# Patient Record
Sex: MALE | Race: WHITE | NOT HISPANIC OR LATINO | Employment: OTHER | URBAN - METROPOLITAN AREA
[De-identification: names, ages, dates, MRNs, and addresses within clinical notes are randomized per-mention and may not be internally consistent; named-entity substitution may affect disease eponyms.]

---

## 2017-12-15 ENCOUNTER — APPOINTMENT (OUTPATIENT)
Dept: GENERAL RADIOLOGY | Facility: CLINIC | Age: 72
End: 2017-12-15
Attending: EMERGENCY MEDICINE
Payer: MEDICARE

## 2017-12-15 ENCOUNTER — APPOINTMENT (OUTPATIENT)
Dept: GENERAL RADIOLOGY | Facility: CLINIC | Age: 72
End: 2017-12-15
Attending: OBSTETRICS & GYNECOLOGY
Payer: MEDICARE

## 2017-12-15 ENCOUNTER — HOSPITAL ENCOUNTER (EMERGENCY)
Facility: CLINIC | Age: 72
Discharge: HOME | End: 2017-12-15
Attending: EMERGENCY MEDICINE | Admitting: EMERGENCY MEDICINE
Payer: MEDICARE

## 2017-12-15 VITALS
OXYGEN SATURATION: 94 % | BODY MASS INDEX: 32.33 KG/M2 | TEMPERATURE: 97.6 F | DIASTOLIC BLOOD PRESSURE: 72 MMHG | RESPIRATION RATE: 16 BRPM | WEIGHT: 260 LBS | HEART RATE: 82 BPM | SYSTOLIC BLOOD PRESSURE: 143 MMHG | HEIGHT: 75 IN

## 2017-12-15 DIAGNOSIS — S62.101A CLOSED FRACTURE OF RIGHT WRIST, INITIAL ENCOUNTER: ICD-10-CM

## 2017-12-15 PROCEDURE — 25605 CLTX DST RDL FX/EPHYS SEP W/: CPT | Mod: RT

## 2017-12-15 PROCEDURE — 96374 THER/PROPH/DIAG INJ IV PUSH: CPT

## 2017-12-15 PROCEDURE — 40000277 XR SURGERY CARM FLUORO LESS THAN 5 MIN W STILLS

## 2017-12-15 PROCEDURE — 40000986 XR WRIST RT G/E 3 VW: Mod: RT

## 2017-12-15 PROCEDURE — 25000128 H RX IP 250 OP 636: Performed by: EMERGENCY MEDICINE

## 2017-12-15 PROCEDURE — 73110 X-RAY EXAM OF WRIST: CPT | Mod: RT

## 2017-12-15 PROCEDURE — 96375 TX/PRO/DX INJ NEW DRUG ADDON: CPT | Mod: 59

## 2017-12-15 PROCEDURE — 99285 EMERGENCY DEPT VISIT HI MDM: CPT | Mod: 25

## 2017-12-15 RX ORDER — OXYCODONE AND ACETAMINOPHEN 5; 325 MG/1; MG/1
1-2 TABLET ORAL EVERY 4 HOURS PRN
Qty: 15 TABLET | Refills: 0 | Status: ON HOLD | OUTPATIENT
Start: 2017-12-15 | End: 2023-12-16

## 2017-12-15 RX ORDER — HYDROMORPHONE HYDROCHLORIDE 1 MG/ML
0.5 INJECTION, SOLUTION INTRAMUSCULAR; INTRAVENOUS; SUBCUTANEOUS
Status: DISCONTINUED | OUTPATIENT
Start: 2017-12-15 | End: 2017-12-15 | Stop reason: HOSPADM

## 2017-12-15 RX ORDER — ONDANSETRON 2 MG/ML
4 INJECTION INTRAMUSCULAR; INTRAVENOUS ONCE
Status: COMPLETED | OUTPATIENT
Start: 2017-12-15 | End: 2017-12-15

## 2017-12-15 RX ADMIN — ONDANSETRON 4 MG: 2 INJECTION INTRAMUSCULAR; INTRAVENOUS at 18:37

## 2017-12-15 RX ADMIN — HYDROMORPHONE HYDROCHLORIDE 0.5 MG: 1 INJECTION, SOLUTION INTRAMUSCULAR; INTRAVENOUS; SUBCUTANEOUS at 18:37

## 2017-12-15 ASSESSMENT — ENCOUNTER SYMPTOMS
ARTHRALGIAS: 1
JOINT SWELLING: 1

## 2017-12-15 NOTE — ED AVS SNAPSHOT
Emergency Department    [address] [facility] [address]    Phone:  [phone]    Fax:  [phone]                                       Shola Lemus   MRN: [id number]    Department:   Emergency Department   Date of Visit:  12/15/2017           Patient Information     Date Of Birth          1945        Your diagnoses for this visit were:     Closed fracture of right wrist, initial encounter        You were seen by Tyson Hedrick MD.      Follow-up Information     Follow up with Gabriel Medrano MD.    Specialty:  Orthopedics    Contact information:    [facility] ORTHOPEDICS  [address] [place] [id number]  [phone]          Discharge Instructions         Upper Extremity Fracture    You have a break (fracture) of the arm, wrist, or hand. This may be a small crack in the bone. Or it may be a major break, with the broken parts pushed out of position. Most fractures will heal without surgery. But you may need surgery if the bones are far out of place or if the break is near the elbow. Treatment is with a special sling called a shoulder immobilizer, or a splint or cast, depending on the type of fracture and where the fracture is. This fracture takes 4 to 6 weeks or longer to heal. The cast may need to be changed in 2 to 3 weeks as swelling goes down.  Home care  Follow these guidelines when caring for yourself:    If you were given a shoulder immobilizer, leave it in place. This will support the injured arm at your side. This is the best position for bone healing. The shoulder immobilizer can be adjusted. If it becomes loose, adjust it so that your forearm is level with the ground (horizontal). Your hand should be level with your elbow.    Put an ice pack on the injured area. Do this for 20 minutes every 1 to 2 hours the first day for pain relief. You can make an ice pack by wrapping a plastic bag of ice cubes in a thin towel. As the ice melts, be careful that the  cast/splint/sling doesn t get wet. You can put the ice pack inside the sling and directly over the splint or cast. Continue using the ice pack 3 to 4 times a day for the next 2 days. Then use the ice pack as needed to ease pain and swelling.    Keep the cast, splint, or sling completely dry at all times. Bathe with your cast, splint, or sling out of the water. Protect it with a large plastic bag, rubber-banded at the top end. If a fiberglass cast, splint, or sling gets wet, you can dry it with a hair dryer.    You may use acetaminophen or ibuprofen to control pain, unless another pain medicine was prescribed. If you have chronic liver or kidney disease, talk with your healthcare provider before using these medicines. Also talk with your provider if you ve had a stomach ulcer or gastrointestinal bleeding.    Don t put creams or objects under the cast if you have itching.  Follow-up care  Follow up with your healthcare provider, or as advised. This is to make sure the bone is healing the way it should.  X-rays may be taken. You will be told of any new findings that may affect your care.  When to seek medical advice  Call your health care provider right away if any of these occur:    The cast or splint cracks    The plaster cast or splint becomes wet or soft    The fiberglass cast or splint stays wet for more than 24 hours    Bad odor from the cast or wound fluid stains the cast    Tightness or pain under the cast or splint gets worse    Fingers become swollen, cold, blue, numb, or tingly    You can t move your fingers    Skin around cast or splint becomes red    Fever of 100.4 F (38 C) or higher, or as directed by your healthcare provider  Date Last Reviewed: 2/1/2017 2000-2017 [employer]. [address] [place] [address], [place] [address]. All rights reserved. This information is not intended as a substitute for professional medical care. Always follow your healthcare professional's instructions.      Opioid  Medication Information    You have been given a prescription for an opioid (narcotic) pain medicine and/or have received a pain medicine while here in the Emergency Department. These medicines can make you drowsy or impaired. You must not drive, operate dangerous equipment, or engage in any other dangerous activities while taking these medications. If you drive while taking these medications, you could be arrested for DUI, or driving under the influence. Do not drink any alcohol while you are taking these medications.   Opioid pain medications can cause addiction. If you have a history of chemical dependency of any type, you are at a higher risk of becoming addicted to pain medications.  Only take these prescribed medications to treat your pain when all other options have been tried. Take it for as short a time and as few doses as possible. Store your pain pills in a secure place, as they are frequently stolen and provide a dangerous opportunity for children or visitors in your house to start abusing these powerful medications. We will not replace any lost or stolen medicine.  As soon as your pain is better, you should flush all your remaining medication.   Many prescription pain medications contain Tylenol  (acetaminophen), including Vicodin , Tylenol #3 , Norco , Lortab , and Percocet .  You should not take any extra pills of Tylenol  if you are using these prescription medications or you can get very sick.  Do not ever take more than 4000 mg of acetaminophen in any 24 hour period.  All opioids tend to cause constipation. Drink plenty of water and eat foods that have a lot of fiber, such as fruits, vegetables, prune juice, apple juice and high fiber cereal.  Take a laxative if you don t move your bowels at least every other day. Miralax , Milk of Magnesia, Colace , or Senna  can be used to keep you regular.            Future Appointments        Provider Department Dept Phone Center    12/15/2017 9:35 PM LORNA  [address] Radiology [phone] [facility]      24 Hour Appointment Hotline       To make an appointment at any [facility] clinic, call [phone] ([phone]). If you don't have a family doctor or clinic, we will help you find one. [facility] clinics are conveniently located to serve the needs of you and your family.             Review of your medicines      START taking        Dose / Directions Last dose taken    oxyCODONE-acetaminophen 5-325 MG per tablet   Commonly known as:  PERCOCET   Dose:  1-2 tablet   Quantity:  15 tablet        Take 1-2 tablets by mouth every 4 hours as needed for pain   Refills:  0          Our records show that you are taking the medicines listed below. If these are incorrect, please call your family doctor or clinic.        Dose / Directions Last dose taken    ASPIRIN PO   Dose:  81 mg        Take 81 mg by mouth   Refills:  0        ATORVASTATIN CALCIUM PO        Take  by mouth.   Refills:  0        GLIPIZIDE PO        Take  by mouth.   Refills:  0        insulin glargine 100 UNIT/ML injection   Commonly known as:  LANTUS   Dose:  65 Units        Inject 65 Units Subcutaneous daily   Refills:  0        METFORMIN HCL PO        Take  by mouth.   Refills:  0        METOPROLOL SUCCINATE PO        Take  by mouth.   Refills:  0                Prescriptions were sent or printed at these locations (1 Prescription)                   Other Prescriptions                Printed at Department/Unit printer (1 of 1)         oxyCODONE-acetaminophen (PERCOCET) 5-325 MG per tablet                Procedures and tests performed during your visit     Procedure/Test Number of Times Performed    Wrist XR, G/E 3 views, right 2      Orders Needing Specimen Collection     None      Pending Results     Date and Time Order Name Status Description    12/15/2017 1914 Wrist XR, G/E 3 views, right Preliminary             Pending Culture Results     No orders found from 12/13/2017 to  12/16/2017.            Pending Results Instructions     If you had any lab results that were not finalized at the time of your Discharge, you can call the ED Lab Result RN at [phone]. You will be contacted by this team for any positive Lab results or changes in treatment. The nurses are available 7 days a week from 10A to 6:30P.  You can leave a message 24 hours per day and they will return your call.        Test Results From Your Hospital Stay        12/15/2017  5:37 PM      Narrative     RIGHT WRIST THREE OR MORE VIEWS  12/15/2017  5:23 PM     HISTORY: Fall, deformity.    COMPARISON: None.        Impression     IMPRESSION: Impacted and displaced fracture noted at the distal  radius. The distal radial fracture fragment is displaced in a dorsal  direction. Fracture also comminuted with intra-articular extension.    JOSSELYN HIGGINS MD         12/15/2017  8:09 PM      Narrative     RIGHT WRIST THREE OR MORE VIEWS 12/15/2017 7:59 PM     COMPARISON: Prereduction 3-view right wrist same day.    HISTORY: Two-view, post reduction.        Impression     IMPRESSION: The right wrist is now in a splint. Dorsal displacement  and dorsal angulation of the distal fragment of the right radial  fracture is again noted without significant change. No new fractures  noted.                Clinical Quality Measure: Blood Pressure Screening     Your blood pressure was checked while you were in the emergency department today. The last reading we obtained was  BP: 146/76 . Please read the guidelines below about what these numbers mean and what you should do about them.  If your systolic blood pressure (the top number) is less than 120 and your diastolic blood pressure (the bottom number) is less than 80, then your blood pressure is normal. There is nothing more that you need to do about it.  If your systolic blood pressure (the top number) is 120-139 or your diastolic blood pressure (the bottom number) is 80-89, your blood pressure may  "be higher than it should be. You should have your blood pressure rechecked within a year by a primary care provider.  If your systolic blood pressure (the top number) is 140 or greater or your diastolic blood pressure (the bottom number) is 90 or greater, you may have high blood pressure. High blood pressure is treatable, but if left untreated over time it can put you at risk for heart attack, stroke, or kidney failure. You should have your blood pressure rechecked by a primary care provider within the next 4 weeks.  If your provider in the emergency department today gave you specific instructions to follow-up with your doctor or provider even sooner than that, you should follow that instruction and not wait for up to 4 weeks for your follow-up visit.        Thank you for choosing [facility]       Thank you for choosing [facility] for your care. Our goal is always to provide you with excellent care. Hearing back from our patients is one way we can continue to improve our services. Please take a few minutes to complete the written survey that you may receive in the mail after you visit with us. Thank you!        [employer] Messaging Information     [employer] lets you send messages to your doctor, view your test results, renew your prescriptions, schedule appointments and more. To sign up, go to [email or web address] . Click on \"Log in\" on the left side of the screen, which will take you to the Welcome page. Then click on \"Sign up Now\" on the right side of the page.     You will be asked to enter the access code listed below, as well as some personal information. Please follow the directions to create your username and password.     Your access code is: [email or web address]  Expires: 3/15/2018  9:32 PM     Your access code will  in 90 days. If you need help or a new code, please call your [facility] clinic or [phone].        Care EveryWhere ID     This is your Care EveryWhere ID. This could be used by other organizations to access " your [facility] medical records  [id number]        Equal Access to Services     SHERMAN GONZALEZ : Roseanne Knott, joe lamar, lc [place], greg steward. So [facility] [phone].    ATENCIÓN: Si habla español, tiene a lr disposición servicios gratuitos de asistencia lingüística. Llame al [phone].    We comply with applicable federal civil rights laws and [place] laws. We do not discriminate on the basis of race, color, national origin, age, disability, sex, sexual orientation, or gender identity.            After Visit Summary       This is your record. Keep this with you and show to your community pharmacist(s) and doctor(s) at your next visit.

## 2017-12-15 NOTE — ED AVS SNAPSHOT
Emergency Department    [address] [place] [address]    Phone:  [phone]    Fax:  [phone]                                       Shola Lemus   MRN: [id number]    Department:   Emergency Department   Date of Visit:  12/15/2017           After Visit Summary Signature Page     I have received my discharge instructions, and my questions have been answered. I have discussed any challenges I see with this plan with the nurse or doctor.    ..........................................................................................................................................  Patient/Patient Representative Signature      ..........................................................................................................................................  Patient Representative Print Name and Relationship to Patient    ..................................................               ................................................  Date                                            Time    ..........................................................................................................................................  Reviewed by Signature/Title    ...................................................              ..............................................  Date                                                            Time

## 2017-12-16 NOTE — ED PROVIDER NOTES
"  History     Chief Complaint:  Trauma    HPI   Shola Lemus is a 72 year old male who presents to the emergency department for evaluation of wrist pain. The patient notes that he was outside when he slipped on some ice and landed on a mixture of ice and asphalt. He says that he stopped himself with his right hand but that he felt pain in it. Upon inspection of his hand he noticed a deformity and swelling in his wrist. When he tried to move it he heard cracking. He says that he did hit his head but that he did not lose consciousness. He denies any pain in his elbow or shoulder.       Allergies:  No known Drug Allergies      Medications:    ASPIRIN PO  insulin glargine (LANTUS) 100 UNIT/ML injection  METOPROLOL SUCCINATE PO  ATORVASTATIN CALCIUM PO  METFORMIN HCL PO  GLIPIZIDE PO    Past Medical History:    Concussion  Diabetes mellitus  Hypercholesterolemia    Hypertension     Past Surgical History:    Tonsillectomy  Bilateral hernia repair    Family History:    History reviewed. No pertinent family history.     Social History:  Marital Status:   [2]  Social History   Substance Use Topics     Smoking status: Never Smoker     Smokeless tobacco: Not on file     Alcohol use Yes      Comment: one drink every 2 weeks        Review of Systems   Musculoskeletal: Positive for arthralgias and joint swelling.   Neurological: Negative for syncope.   All other systems reviewed and are negative.        Physical Exam   First Vitals:  BP: 144/72  Pulse: 82  Temp: 97.6  F (36.4  C)  Resp: 14  Height: 190.5 cm (6' 3\")  Weight: 117.9 kg (260 lb)  SpO2: 96 %      Physical Exam  GENERAL: well developed, pleasant  HEAD: atraumatic  EYES: pupils reactive, extraocular muscles intact, conjunctivae normal  ENT:  mucus membranes moist  NECK:  trachea midline, normal range of motion  RESPIRATORY: no tachypnea, breath sounds clear to auscultation   CVS: normal S1/S2, no murmurs, intact distal pulses  ABDOMEN: soft, nontender, " nondistention  MUSCULOSKELETAL: no deformities. Deformity to right wrist  SKIN: warm and dry, no acute rashes or ulceration. Mild abrasion to the ulnar aspect no evidence of an open fracture  NEURO: GCS 15, cranial nerves intact, alert and oriented x3  PSYCH:  Mood/affect normal          Emergency Department Course     Imaging:  Radiology findings were communicated with the patient who voiced understanding of the findings.    Wrist XR, G/E 3 views, right  Final Result  IMPRESSION: The right wrist is now in a splint. Dorsal displacement  and dorsal angulation of the distal fragment of the right radial  fracture is again noted without significant change. No new fractures  noted.    FRANCO NELSON MD    XR Surgery ANNITA Fluoro L/T 5 Min w Stills  Final Result  IMPRESSION: Single fluoroscopic view during reduction of a distal  right radius fracture.    FRANCO NELSON MD    Wrist XR, G/E 3 views, right  Final Result  IMPRESSION: Impacted and displaced fracture noted at the distal  radius. The distal radial fracture fragment is displaced in a dorsal  direction. Fracture also comminuted with intra-articular extension.    JOSSELYN HIGGINS MD  Reading per radiology      Procedures:  Distal radius fracture reduction  Indication: same  Procedure:  Hematoma block used; skin cleaned with alcohol, 27 g needle injected 8cc of 0.25% marcaine, patient hung in traps with 15 pounds of counter traction for 15 minutes, mechanism of injury was exaggerated and reduction was applied to distal radius.  Custom fiberglass splint was applied by myself and the tech.  Repeat imaging did not show significant improvement.  Patient was taken to [facility], portable C arm was used, patient re-hung in traps with counter traction, myself and Dr. Moi Loya reduced the fracture, a new custom splint was applied.    Interventions:  1837 Dilaudid 0.5 mg IV  1837 Zofran 4 mg IV    Emergency Department Course:  Nursing notes and vitals reviewed.  I performed an  exam of the patient as documented above.   I discussed the treatment plan with the patient. They expressed understanding of this plan and consented to discharge. They will be discharged home with instructions for care and follow up. In addition, the patient will return to the emergency department if their symptoms persist, worsen, if new symptoms arise or if there is any concern.  All questions were answered.     I personally reviewed the imaging results with the patient and answered all related questions prior to discharge.  Impression & Plan      Medical Decision Making:  Patient presents with mechanical fall and a wrist fracture. Initial attempt with hematoma block and IV pain control. Repeat X rays did not show a significant improvement. He was taken to a stabilization room and used a C arm as well as counter traction. The counter traction was used during the first attempt and felt more movement and he was splinted. Discussed with him the possibility of needing surgery.     Diagnosis:    ICD-10-CM    1. Closed fracture of right wrist, initial encounter S62.101A      Disposition:   Discharged    Discharge Medications:  Discharge Medication List as of 12/15/2017  9:32 PM      START taking these medications    Details   oxyCODONE-acetaminophen (PERCOCET) 5-325 MG per tablet Take 1-2 tablets by mouth every 4 hours as needed for pain, Disp-15 tablet, R-0, Local Print             Scribe Disclosure:  I, Tra Sexton, am serving as a scribe at 9:32 PM on 12/15/2017 to document services personally performed by Tyson Hedrick MD, based on my observations and the provider's statements to me.       EMERGENCY DEPARTMENT       Tyson Hedrick MD  12/17/17 3013

## 2019-08-06 ENCOUNTER — HOSPITAL ENCOUNTER (EMERGENCY)
Facility: CLINIC | Age: 74
Discharge: HOME | End: 2019-08-06
Attending: NURSE PRACTITIONER | Admitting: NURSE PRACTITIONER
Payer: MEDICARE

## 2019-08-06 VITALS
DIASTOLIC BLOOD PRESSURE: 77 MMHG | BODY MASS INDEX: 32.33 KG/M2 | WEIGHT: 260 LBS | RESPIRATION RATE: 18 BRPM | SYSTOLIC BLOOD PRESSURE: 136 MMHG | HEART RATE: 78 BPM | HEIGHT: 75 IN | OXYGEN SATURATION: 100 % | TEMPERATURE: 98.7 F

## 2019-08-06 DIAGNOSIS — S51.012A ELBOW LACERATION, LEFT, INITIAL ENCOUNTER: ICD-10-CM

## 2019-08-06 PROCEDURE — 99283 EMERGENCY DEPT VISIT LOW MDM: CPT

## 2019-08-06 PROCEDURE — 12002 RPR S/N/AX/GEN/TRNK2.6-7.5CM: CPT

## 2019-08-06 RX ORDER — CEPHALEXIN 500 MG/1
500 CAPSULE ORAL 4 TIMES DAILY
Qty: 20 CAPSULE | Refills: 0 | Status: SHIPPED | OUTPATIENT
Start: 2019-08-06 | End: 2019-08-11

## 2019-08-06 ASSESSMENT — ENCOUNTER SYMPTOMS: WOUND: 1

## 2019-08-06 ASSESSMENT — MIFFLIN-ST. JEOR: SCORE: 2014.98

## 2019-08-06 NOTE — ED AVS SNAPSHOT
Emergency Department  [address] [place] [address]  Phone:  [phone]  Fax:  [phone]                                    Shola Lemus   MRN: [id number]    Department:   Emergency Department   Date of Visit:  8/6/2019           After Visit Summary Signature Page    I have received my discharge instructions, and my questions have been answered. I have discussed any challenges I see with this plan with the nurse or doctor.    ..........................................................................................................................................  Patient/Patient Representative Signature      ..........................................................................................................................................  Patient Representative Print Name and Relationship to Patient    ..................................................               ................................................  Date                                   Time    ..........................................................................................................................................  Reviewed by Signature/Title    ...................................................              ..............................................  Date                                               Time          22EPIC Rev 08/18

## 2019-08-06 NOTE — ED TRIAGE NOTES
Pt sustained a laceration on his left elbow after scaping it on a ladder.  Denies other injuries.  Tetanus status unknown.

## 2019-08-07 NOTE — ED PROVIDER NOTES
"  History     Chief Complaint:  Laceration    HPI   Shola Lemus is a 74 year old male with a history of diabetes, hyperlipidemia, and hypertension who presents to the emergency department for evaluation of a laceration. The patient reports that earlier today he was climbing a telescoping ladder in his garage when the ladder slipped. This caused the patient hit the metal part of the ladder with his left elbow. He did not fall. His last tetanus vaccination was in 2012.    Allergies:  No Known Drug Allergies    Medications:    Aspirin  Atorvastatin  Glipizide  Metformin  Metoprolol  Percocet    Past Medical History:    Concussions  Diabetes  Hypercholesteremia  Hypertension    Past Surgical History:    Tonsillectomy  Hernia repair    Family History:    No past pertinent family history.    Social History:  The patient was accompanied to the ED by his wife.  Smoking Status: Never  Smokeless Tobacco: Never  Alcohol Use: Yes  Drug Use: No  Marital Status:        Review of Systems   Skin: Positive for wound.   All other systems reviewed and are negative.    Physical Exam   Vitals:  Patient Vitals for the past 24 hrs:   BP Temp Temp src Pulse Heart Rate Resp SpO2 Height Weight   08/06/19 1835 (!) 144/80 98.7  F (37.1  C) Oral 104 104 18 98 % 1.905 m (6' 3\") 117.9 kg (260 lb)     Physical Exam  Nursing notes reviewed. Vitals reviewed.  General: Alert. Well kept.  Eyes:  Conjunctiva non-injected, non-icteric.  Neck/Throat: Moist mucous membranes. Normal voice.  Cardiac: Regular rhythm. Normal heart sounds.  Pulmonary: Clear and equal breath sounds bilaterally.   Musculoskeletal: Normal gross range of motion of all 4 extremities. No pain with ROM of left shoulder, elbow, wrist.    Neurological: Alert and oriented x4. Normal sensation in radial, ulnar, and medial aspects.   Skin: Warm and dry. A subcutaneous and stellate clean 5.5 cm laceration left upper arm proximal to the elbow.  Psych: Affect normal. Good eye " contact.    Emergency Department Course     Procedures:      Laceration Repair        LACERATION:  A subcutaneous and stellate clean 5.5 cm laceration.      LOCATION:  Left upper arm proximal to the elbow      FUNCTION:  Distally sensation, circulation, motor and tendon function are intact.      ANESTHESIA:  Local using 0.5% bupivicaine total of 6 mLs      PREPARATION:  Irrigation with Normal Saline and Shur Clens      DEBRIDEMENT:  no debridement and wound explored, no foreign body found      CLOSURE:  Wound was closed with One Layer.  Skin closed with 4 x 3.0 Ethylon using horizontal mattress and 2 x 3.0 Ethylon interrupted sutures.  The wound was reinforced with steri-strips and covered with a dressing and ACE wrap.    Emergency Department Course:  Nursing notes and vitals reviewed. 1915 I performed an exam of the patient as documented above.     0954 I rechecked and updated the patient. I preformed the procedure outlined above.    Findings and plan explained to the Patient. Patient discharged home with instructions regarding supportive care, medications, and reasons to return. The importance of close follow-up was reviewed. The patient was prescribed Keflex.    Impression & Plan      Medical Decision Making:  Shola Lemus is a 74 year old male who presents with a laceration to left upper arm proximal to the elbow. The wound was carefully evaluated and explored. The laceration was closed with 4 x 3.0 Ethylon using horizontal mattress and 2 x 3.0 Ethylon interrupted sutures as noted above. There is no evidence of muscular, tendon, or bony damage with this laceration. No signs of foreign body. Possible complications (infection, scarring) were reviewed with the patient. He will be placed on prophylactic antibiotics due to the large wound, proximity to the elbow, and his Type II diabetes. Follow up with primary care will be indicated for suture removal as noted in the discharge section. His tetanus is up to  date 5/8/12.  Diagnosis:    ICD-10-CM   1. Elbow laceration, left, initial encounter S51.012A     Disposition:  discharged to home    Discharge Medications:     cephALEXin 500 MG capsule  Commonly known as:  KEFLEX  500 mg, Oral, 4 TIMES DAILY          Alessandro PAOLMINO, am serving as a scribe on 8/6/2019 at 7:20 PM to personally document services performed by Olga Flower DNP based on my observations and the provider's statements to me.     Alessandro Sr  8/6/2019    EMERGENCY DEPARTMENT       Olga Flower, CNP  08/06/19 4646

## 2021-06-21 ENCOUNTER — HOSPITAL ENCOUNTER (EMERGENCY)
Facility: CLINIC | Age: 76
Discharge: HOME | End: 2021-06-21
Attending: PHYSICIAN ASSISTANT | Admitting: PHYSICIAN ASSISTANT
Payer: MEDICARE

## 2021-06-21 VITALS
OXYGEN SATURATION: 98 % | RESPIRATION RATE: 16 BRPM | DIASTOLIC BLOOD PRESSURE: 65 MMHG | HEART RATE: 71 BPM | BODY MASS INDEX: 33.32 KG/M2 | SYSTOLIC BLOOD PRESSURE: 160 MMHG | TEMPERATURE: 96.7 F | WEIGHT: 268 LBS | HEIGHT: 75 IN

## 2021-06-21 DIAGNOSIS — S61.217A LACERATION OF LEFT LITTLE FINGER WITHOUT FOREIGN BODY WITHOUT DAMAGE TO NAIL, INITIAL ENCOUNTER: ICD-10-CM

## 2021-06-21 PROCEDURE — 99283 EMERGENCY DEPT VISIT LOW MDM: CPT

## 2021-06-21 PROCEDURE — 12001 RPR S/N/AX/GEN/TRNK 2.5CM/<: CPT

## 2021-06-21 ASSESSMENT — MIFFLIN-ST. JEOR: SCORE: 2041.27

## 2021-06-21 ASSESSMENT — ENCOUNTER SYMPTOMS
NUMBNESS: 0
WOUND: 1

## 2021-06-21 NOTE — ED NOTES
Emergency Department Technician Wound Irrigation Note:    6/21/2021    10:45 AM      Wound location:  Left pinky finger    Irrigation Fluid: Normal Saline    Estimated Irrigation Volume (60 mL fluid per cm): 250mL    Janine Morales

## 2021-06-21 NOTE — ED PROVIDER NOTES
"  History   Chief Complaint:  Finger laceration       The history is provided by the patient.      Shola Lemus is a 76 year old right handed male with history of HTN and type 2 DM who presents with a finger laceration. The patient reports that this morning he was cutting a watermelon when the plate slipped and he cut his left pinky finger. He did put a bandage on the wound prior to arrival and he denies numbness and tingling in the finger. He endorses some throbbing. His most recent Tdap was in 2019. He denies being on blood thinners.     Review of Systems   Skin: Positive for wound (Left pinky).   Neurological: Negative for numbness (and tingling).   All other systems reviewed and are negative.      Allergies:  No Known Allergies    Medications:  Aspirin 81 mg  Lipitor   Glucotrol XL  Lantus pen   Glucophage   Toprol XL    Past Medical History:    HTN  DM   Hyperlipidemia   Paroxysmal SVT  Bilateral inguinal hernia  Ulcerative colitis  Erectile dysfunction  TMJ syndrome  ORIF distal right radial fracture    Past Surgical History:    Tonsillectomy   Hernia repair   Soft tissue mass excision from neck    Family History:    Cancer  Diabetes   Heart disease   Scleroderma     Social History:  Patient presents alone. Nonsmoker.     Physical Exam     Patient Vitals for the past 24 hrs:   BP Temp Temp src Pulse Resp SpO2 Height Weight   06/21/21 1017 (!) 160/65 96.7  F (35.9  C) Temporal 71 16 98 % 1.905 m (6' 3\") 121.6 kg (268 lb)       Physical Exam  HEENT: mmm  Eyes: PERRL B/L  Neck: Supple  CV: Peripheral pulses intact and regular  Resp: Speaking in full sentences without any respiratory distress  Ext:  Left Hand  No bony TTP.  2.5cm laceration overlying palmar aspect of 5th finger at middle phalanx.  Able to fire Hand/finger flexors and extensors.  There is normal strength against resistance  Sensation and perfusion intact throughout hand  Remainder of the skeletal survey is unremarkable  Skin: warm dry well " perfused. See above.  Neuro: Alert  Nursing notes and vital signs reviewed.      Emergency Department Course     Procedures    Laceration Repair        LACERATION:  A simple clean 2.5 cm laceration.      LOCATION: Left pinky finger      FUNCTION:  Distally sensation, circulation, motor and tendon function are intact.      ANESTHESIA:  Digital block using 0.5% bupivicaine total of 2 mLs      PREPARATION:  Irrigation and Scrubbing with Normal Saline and Shur Clens      DEBRIDEMENT:  Small amount of fat debridement, wound explored, no foreign body found      CLOSURE:  Wound was closed with One Layer.  Skin closed with 8 x 5.0 Ethylon using interrupted sutures.      Emergency Department Course:    Reviewed:  I reviewed nursing notes, vitals, past medical history and care everywhere    Assessments:  1025 I obtained history and examined the patient as noted above.   1056 I rechecked the patient and preformed a laceration repair.     Disposition:  The patient was discharged to home.       Impression & Plan     Medical Decision Making:  Shola Lemus is a 76 year old male who presents for evaluation of a laceration to the left pinky. Tetanus TUD.  The wound was carefully evaluated and explored.  The laceration was closed as noted above.  There is no evidence of muscular, tendon, or bony damage with this laceration.  No signs of foreign body.  Possible complications (infection, scarring) were reviewed with the patient.  Follow up with primary care as noted in the discharge section. Discussed reasons to return. All questions answered. Patient discharged to home in stable condition.    Diagnosis:    ICD-10-CM    1. Laceration of left little finger without foreign body without damage to nail, initial encounter  S61.217A        Discharge Medications:  New Prescriptions    No medications on file       Scribe Disclosure:  Marcio PALOMINO, am serving as a scribe at 10:19 AM on 6/21/2021 to document services personally  performed by Boom Adams PA-C based on my observations and the provider's statements to me.     This record was created at least in part using electronic voice recognition software, so please excuse any typographical errors.         Boom Adams PA-C  06/21/21 1122

## 2021-06-21 NOTE — DISCHARGE INSTRUCTIONS
Keep wound clean and covered with topical antibiotic and bandage.   Sutures out in 10-14 days with primary care doctor.  The small amount of fat sticking out of wound will die as the wound heals.

## 2022-06-03 PROCEDURE — 99283 EMERGENCY DEPT VISIT LOW MDM: CPT

## 2022-06-03 NOTE — ED TRIAGE NOTES
Triage Assessment     Row Name 06/03/22 1738       Triage Assessment (Adult)    Airway WDL WDL       Respiratory WDL    Respiratory WDL WDL       Skin Circulation/Temperature WDL    Skin Circulation/Temperature WDL WDL       Cardiac WDL    Cardiac WDL WDL       Peripheral/Neurovascular WDL    Peripheral Neurovascular WDL WDL       Cognitive/Neuro/Behavioral WDL    Cognitive/Neuro/Behavioral WDL WDL

## 2022-06-04 ENCOUNTER — HOSPITAL ENCOUNTER (EMERGENCY)
Facility: CLINIC | Age: 77
Discharge: HOME | End: 2022-06-04
Attending: EMERGENCY MEDICINE | Admitting: EMERGENCY MEDICINE
Payer: MEDICARE

## 2022-06-04 VITALS
OXYGEN SATURATION: 95 % | HEIGHT: 74 IN | TEMPERATURE: 97.1 F | BODY MASS INDEX: 35.29 KG/M2 | WEIGHT: 275 LBS | RESPIRATION RATE: 20 BRPM | DIASTOLIC BLOOD PRESSURE: 83 MMHG | HEART RATE: 93 BPM | SYSTOLIC BLOOD PRESSURE: 152 MMHG

## 2022-06-04 DIAGNOSIS — K62.5 ANAL BLEEDING: ICD-10-CM

## 2022-06-04 LAB
CPB POCT: NO
HCO3 BLDV-SCNC: 28 MMOL/L (ref 21–28)
HCT VFR BLD CALC: 45 % (ref 40–53)
HGB BLD-MCNC: 15.3 G/DL (ref 13.3–17.7)
PCO2 BLDV: 47 MM HG (ref 40–50)
PH BLDV: 7.39 [PH] (ref 7.32–7.43)
PO2 BLDV: 25 MM HG (ref 25–47)
POTASSIUM BLD-SCNC: 3.7 MMOL/L (ref 3.4–5.3)
SAO2 % BLDV: 45 % (ref 94–100)
SODIUM BLD-SCNC: 139 MMOL/L (ref 133–144)

## 2022-06-04 PROCEDURE — 84295 ASSAY OF SERUM SODIUM: CPT

## 2022-06-04 PROCEDURE — 82803 BLOOD GASES ANY COMBINATION: CPT

## 2022-06-04 ASSESSMENT — ENCOUNTER SYMPTOMS
ABDOMINAL PAIN: 0
CONSTIPATION: 0
LIGHT-HEADEDNESS: 0
DIARRHEA: 0
ANAL BLEEDING: 1
DIZZINESS: 0

## 2022-06-04 NOTE — ED PROVIDER NOTES
History   Chief Complaint:  Rectal Bleeding     The history is provided by the patient.      Shola Lemus is a 77 year old male with history of hypertension, hyperlipidemia and type 2 diabetes mellitus who presents with rectal bleeding. Symptoms began acutely today at approximately 1600 after a bowel movement. The patient states he wiped and noted red blood.  He did not look in the toilet.   No abdominal pain, lightheadedness, or dizziness. He is unsure if there was blood in his stool. He does not take any blood thinning medications. He reports previous hernia surgeries. While, in the ER he denies episodes of rectal bleeding or blood with 2 bowel movements while waiting to be seen.     Review of Systems   Gastrointestinal: Positive for anal bleeding. Negative for abdominal pain, constipation and diarrhea.   Neurological: Negative for dizziness and light-headedness.   All other systems reviewed and are negative.        Allergies:  No Known Allergies    Medications:  Aspirin   Atorvastatin   Glipizide   Metformin   Metoprolol   Insulin glargine    Past Medical History:     Concussion   Type 2 Diabetes mellitus    Hypercholesteremia   Hypertension   Hyperlipidemia   Hernia   Ulcerative colitis   Nuclear sclerotic cataract of both eyes  Erectile dysfunction  TMJ syndrome   Bundle Branch Block, Left Anterior Fascicular     Past Surgical History:    Tonsillectomy   Hernia repair, bilateral   ORIF distal right radial fracture   Excision large soft tissue mass posterior neck    Family History:    Brother: prostate cancer, Thyroid cancer   Father: prostate cancer, diabetes mellitus, Myocardial Infarction   Mother: scleroderma     Social History:  Presents alone.   PCP: Bryan Patrick    Physical Exam     Patient Vitals for the past 24 hrs:   BP Temp Temp src Pulse Resp SpO2 Height Weight   06/04/22 0045 -- -- -- -- -- 95 % -- --   06/04/22 0043 (!) 152/83 -- -- 93 -- -- -- --   06/03/22 1737 (!) 178/83 97.1  F  "(36.2  C) Temporal 97 20 95 % 1.88 m (6' 2\") 124.7 kg (275 lb)       Physical Exam  General: Appears well-developed and well-nourished.   Head: No signs of trauma.   CV: Normal rate and regular rhythm.    Resp: Effort normal and breath sounds normal. No respiratory distress.   GI: Soft. There is no tenderness.  No rebound or guarding.  Normal bowel sounds.    Rectal:  No blood in stool or on KIMBERLY.  Small anal irritation  Neuro: The patient is alert and oriented. Speech normal.  Skin: Skin is warm and dry. No rash noted.   Psych: normal mood and affect. behavior is normal.     Emergency Department Course     Laboratory:  Labs Ordered and Resulted from Time of ED Arrival to Time of ED Departure   ISTAT GASES ELECTROLYTES VENOUS POCT - Abnormal       Result Value    CPB Applied No      Hematocrit POCT 45      Bicarbonate Venous POCT 28      Hemoglobin POCT 15.3      Potassium POCT 3.7      Sodium POCT 139      pCO2 Venous POCT 47      pH Venous POCT 7.39      pO2 Venous POCT 25      O2 Sat, Venous POCT 45 (*)         Emergency Department Course:         Reviewed:  I reviewed nursing notes, vitals, past medical history and Care Everywhere    Assessments:  0043 I obtained history and examined the patient as noted above.     Disposition:  The patient was discharged to home.     Impression & Plan       Medical Decision Making:  This 77-year-old gentleman presents after seeing blood after wiping after a bowel movement.  He states that he had a bowel movement and flushed the toilet and had then wiped and saw blood.  Patient had not looked in the toilet prior to flushing initially.  He denied any other complaints. He had a couple of bowel movements since then that did not show any signs of any blood.  On evaluation, he did have a small irritation to the anal opening which is likely the source of the bleeding.  There is no continued bleeding noted and his hemoglobin was appropriate.  I discussed using a fiber supplementation " and Preparation H to help prevent further episodes and recommended he follow with his primary care doctor.       Diagnosis:    ICD-10-CM    1. Anal bleeding  K62.5        Scribe Disclosure:  I, Hortenciamaradha Suh, am serving as a scribe at 12:42 AM on 6/4/2022 to document services personally performed by Michael Coats MD based on my observations and the provider's statements to me.            Michael Coats MD  06/04/22 0247

## 2023-12-15 ENCOUNTER — APPOINTMENT (OUTPATIENT)
Dept: CT IMAGING | Facility: CLINIC | Age: 78
DRG: 177 | End: 2023-12-15
Attending: EMERGENCY MEDICINE
Payer: MEDICARE

## 2023-12-15 ENCOUNTER — HOSPITAL ENCOUNTER (INPATIENT)
Facility: CLINIC | Age: 78
LOS: 1 days | Discharge: HOME | DRG: 177 | End: 2023-12-17
Attending: EMERGENCY MEDICINE | Admitting: STUDENT IN AN ORGANIZED HEALTH CARE EDUCATION/TRAINING PROGRAM
Payer: MEDICARE

## 2023-12-15 ENCOUNTER — APPOINTMENT (OUTPATIENT)
Dept: GENERAL RADIOLOGY | Facility: CLINIC | Age: 78
DRG: 177 | End: 2023-12-15
Attending: EMERGENCY MEDICINE
Payer: MEDICARE
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
## 2023-12-15 DIAGNOSIS — S01.511A LACERATION OF INTRAORAL SURFACE OF LIP, INITIAL ENCOUNTER: ICD-10-CM

## 2023-12-15 DIAGNOSIS — I26.99 OTHER ACUTE PULMONARY EMBOLISM WITHOUT ACUTE COR PULMONALE (H): Primary | ICD-10-CM

## 2023-12-15 DIAGNOSIS — R55 SYNCOPE AND COLLAPSE: ICD-10-CM

## 2023-12-15 DIAGNOSIS — S02.411A CLOSED LE FORT I FRACTURE, INITIAL ENCOUNTER (H): ICD-10-CM

## 2023-12-15 DIAGNOSIS — S02.2XXA CLOSED FRACTURE OF NASAL BONE, INITIAL ENCOUNTER: ICD-10-CM

## 2023-12-15 DIAGNOSIS — U07.1 COVID-19: ICD-10-CM

## 2023-12-15 LAB
ALBUMIN SERPL BCG-MCNC: 3.8 G/DL (ref 3.5–5.2)
ALP SERPL-CCNC: 84 U/L (ref 40–150)
ALT SERPL W P-5'-P-CCNC: 27 U/L (ref 0–70)
ANION GAP SERPL CALCULATED.3IONS-SCNC: 10 MMOL/L (ref 7–15)
AST SERPL W P-5'-P-CCNC: 31 U/L (ref 0–45)
ATRIAL RATE - MUSE: 106 BPM
BASOPHILS # BLD AUTO: 0 10E3/UL (ref 0–0.2)
BASOPHILS NFR BLD AUTO: 0 %
BILIRUB SERPL-MCNC: 0.8 MG/DL
BUN SERPL-MCNC: 11.7 MG/DL (ref 8–23)
CALCIUM SERPL-MCNC: 8.5 MG/DL (ref 8.8–10.2)
CHLORIDE SERPL-SCNC: 96 MMOL/L (ref 98–107)
CREAT SERPL-MCNC: 0.95 MG/DL (ref 0.67–1.17)
DEPRECATED HCO3 PLAS-SCNC: 25 MMOL/L (ref 22–29)
DIASTOLIC BLOOD PRESSURE - MUSE: NORMAL MMHG
EGFRCR SERPLBLD CKD-EPI 2021: 83 ML/MIN/1.73M2
EOSINOPHIL # BLD AUTO: 0.1 10E3/UL (ref 0–0.7)
EOSINOPHIL NFR BLD AUTO: 1 %
ERYTHROCYTE [DISTWIDTH] IN BLOOD BY AUTOMATED COUNT: 13.8 % (ref 10–15)
FLUAV RNA SPEC QL NAA+PROBE: NEGATIVE
FLUBV RNA RESP QL NAA+PROBE: NEGATIVE
GLUCOSE BLDC GLUCOMTR-MCNC: 171 MG/DL (ref 70–99)
GLUCOSE SERPL-MCNC: 135 MG/DL (ref 70–99)
HCO3 BLDV-SCNC: 23 MMOL/L (ref 21–28)
HCT VFR BLD AUTO: 39.9 % (ref 40–53)
HGB BLD-MCNC: 13 G/DL (ref 13.3–17.7)
HOLD SPECIMEN: NORMAL
HOLD SPECIMEN: NORMAL
IMM GRANULOCYTES # BLD: 0.1 10E3/UL
IMM GRANULOCYTES NFR BLD: 1 %
INTERPRETATION ECG - MUSE: NORMAL
LACTATE BLD-SCNC: 1.4 MMOL/L
LYMPHOCYTES # BLD AUTO: 1 10E3/UL (ref 0.8–5.3)
LYMPHOCYTES NFR BLD AUTO: 10 %
MCH RBC QN AUTO: 29.8 PG (ref 26.5–33)
MCHC RBC AUTO-ENTMCNC: 32.6 G/DL (ref 31.5–36.5)
MCV RBC AUTO: 92 FL (ref 78–100)
MONOCYTES # BLD AUTO: 1.4 10E3/UL (ref 0–1.3)
MONOCYTES NFR BLD AUTO: 14 %
NEUTROPHILS # BLD AUTO: 7.5 10E3/UL (ref 1.6–8.3)
NEUTROPHILS NFR BLD AUTO: 74 %
NRBC # BLD AUTO: 0 10E3/UL
NRBC BLD AUTO-RTO: 0 /100
P AXIS - MUSE: 60 DEGREES
PCO2 BLDV: 36 MM HG (ref 40–50)
PH BLDV: 7.41 [PH] (ref 7.32–7.43)
PLATELET # BLD AUTO: 230 10E3/UL (ref 150–450)
PO2 BLDV: 42 MM HG (ref 25–47)
POTASSIUM SERPL-SCNC: 4 MMOL/L (ref 3.4–5.3)
PR INTERVAL - MUSE: 218 MS
PROT SERPL-MCNC: 7.5 G/DL (ref 6.4–8.3)
QRS DURATION - MUSE: 120 MS
QT - MUSE: 340 MS
QTC - MUSE: 451 MS
R AXIS - MUSE: -71 DEGREES
RBC # BLD AUTO: 4.36 10E6/UL (ref 4.4–5.9)
RSV RNA SPEC NAA+PROBE: NEGATIVE
SAO2 % BLDV: 78 % (ref 94–100)
SARS-COV-2 RNA RESP QL NAA+PROBE: POSITIVE
SODIUM SERPL-SCNC: 131 MMOL/L (ref 135–145)
SYSTOLIC BLOOD PRESSURE - MUSE: NORMAL MMHG
T AXIS - MUSE: 79 DEGREES
VENTRICULAR RATE- MUSE: 106 BPM
WBC # BLD AUTO: 10.1 10E3/UL (ref 4–11)

## 2023-12-15 PROCEDURE — 250N000013 HC RX MED GY IP 250 OP 250 PS 637: Performed by: EMERGENCY MEDICINE

## 2023-12-15 PROCEDURE — 72125 CT NECK SPINE W/O DYE: CPT | Mod: MA

## 2023-12-15 PROCEDURE — 83036 HEMOGLOBIN GLYCOSYLATED A1C: CPT | Performed by: STUDENT IN AN ORGANIZED HEALTH CARE EDUCATION/TRAINING PROGRAM

## 2023-12-15 PROCEDURE — 70486 CT MAXILLOFACIAL W/O DYE: CPT | Mod: MA

## 2023-12-15 PROCEDURE — 99285 EMERGENCY DEPT VISIT HI MDM: CPT | Mod: 25

## 2023-12-15 PROCEDURE — 96361 HYDRATE IV INFUSION ADD-ON: CPT

## 2023-12-15 PROCEDURE — 85025 COMPLETE CBC W/AUTO DIFF WBC: CPT | Performed by: EMERGENCY MEDICINE

## 2023-12-15 PROCEDURE — 82962 GLUCOSE BLOOD TEST: CPT

## 2023-12-15 PROCEDURE — 93005 ELECTROCARDIOGRAM TRACING: CPT

## 2023-12-15 PROCEDURE — 87637 SARSCOV2&INF A&B&RSV AMP PRB: CPT | Performed by: EMERGENCY MEDICINE

## 2023-12-15 PROCEDURE — G1010 CDSM STANSON: HCPCS

## 2023-12-15 PROCEDURE — 84484 ASSAY OF TROPONIN QUANT: CPT | Performed by: EMERGENCY MEDICINE

## 2023-12-15 PROCEDURE — 82803 BLOOD GASES ANY COMBINATION: CPT

## 2023-12-15 PROCEDURE — 96360 HYDRATION IV INFUSION INIT: CPT

## 2023-12-15 PROCEDURE — 258N000003 HC RX IP 258 OP 636: Performed by: EMERGENCY MEDICINE

## 2023-12-15 PROCEDURE — 36415 COLL VENOUS BLD VENIPUNCTURE: CPT | Performed by: EMERGENCY MEDICINE

## 2023-12-15 PROCEDURE — 80053 COMPREHEN METABOLIC PANEL: CPT | Performed by: EMERGENCY MEDICINE

## 2023-12-15 PROCEDURE — 71045 X-RAY EXAM CHEST 1 VIEW: CPT

## 2023-12-15 RX ORDER — OXYCODONE HYDROCHLORIDE 5 MG/1
5 TABLET ORAL ONCE
Status: COMPLETED | OUTPATIENT
Start: 2023-12-15 | End: 2023-12-15

## 2023-12-15 RX ORDER — ACETAMINOPHEN 500 MG
1000 TABLET ORAL ONCE
Status: COMPLETED | OUTPATIENT
Start: 2023-12-15 | End: 2023-12-15

## 2023-12-15 RX ADMIN — OXYCODONE HYDROCHLORIDE 5 MG: 5 TABLET ORAL at 22:38

## 2023-12-15 RX ADMIN — SODIUM CHLORIDE 1000 ML: 9 INJECTION, SOLUTION INTRAVENOUS at 18:00

## 2023-12-15 RX ADMIN — ACETAMINOPHEN 1000 MG: 500 TABLET, FILM COATED ORAL at 20:52

## 2023-12-15 ASSESSMENT — ACTIVITIES OF DAILY LIVING (ADL)
ADLS_ACUITY_SCORE: 35

## 2023-12-15 NOTE — ED NOTES
Bed: ED30  Expected date:   Expected time:   Means of arrival:   Comments:  Sofie 252 78 M syncopal landed face first facial bleeding no thinners alert eta [id number]

## 2023-12-15 NOTE — ED TRIAGE NOTES
"  BIBA from home. Had syncopal event and fell to the floor, striking head on floor. LOC for \"a few seconds\". Not on thinners. Dried blood face and head. C-collar in place on arrival. Complains of moth pain/ lip pain. BG= 159     Triage Assessment (Adult)       Row Name 12/15/23 1725          Triage Assessment    Airway WDL WDL        Respiratory WDL    Respiratory WDL X  nasal congestion        Skin Circulation/Temperature WDL    Skin Circulation/Temperature WDL X  possible lac over right ear        Cardiac WDL    Cardiac WDL WDL     Cardiac Rhythm Heart block;ST        Cognitive/Neuro/Behavioral WDL    Cognitive/Neuro/Behavioral WDL WDL                     "

## 2023-12-16 ENCOUNTER — APPOINTMENT (OUTPATIENT)
Dept: CARDIOLOGY | Facility: CLINIC | Age: 78
DRG: 177 | End: 2023-12-16
Attending: STUDENT IN AN ORGANIZED HEALTH CARE EDUCATION/TRAINING PROGRAM
Payer: MEDICARE

## 2023-12-16 ENCOUNTER — APPOINTMENT (OUTPATIENT)
Dept: CT IMAGING | Facility: CLINIC | Age: 78
DRG: 177 | End: 2023-12-16
Attending: STUDENT IN AN ORGANIZED HEALTH CARE EDUCATION/TRAINING PROGRAM
Payer: MEDICARE

## 2023-12-16 LAB
ALBUMIN SERPL BCG-MCNC: 3.7 G/DL (ref 3.5–5.2)
ALP SERPL-CCNC: 76 U/L (ref 40–150)
ALT SERPL W P-5'-P-CCNC: 24 U/L (ref 0–70)
ANION GAP SERPL CALCULATED.3IONS-SCNC: 8 MMOL/L (ref 7–15)
AST SERPL W P-5'-P-CCNC: 26 U/L (ref 0–45)
ATRIAL RATE - MUSE: 89 BPM
ATRIAL RATE - MUSE: 95 BPM
BILIRUB SERPL-MCNC: 0.4 MG/DL
BUN SERPL-MCNC: 11.4 MG/DL (ref 8–23)
CALCIUM SERPL-MCNC: 8.2 MG/DL (ref 8.8–10.2)
CHLORIDE SERPL-SCNC: 102 MMOL/L (ref 98–107)
CREAT SERPL-MCNC: 0.84 MG/DL (ref 0.67–1.17)
CRP SERPL-MCNC: 52.35 MG/L
D DIMER PPP FEU-MCNC: 1.58 UG/ML FEU (ref 0–0.5)
DEPRECATED HCO3 PLAS-SCNC: 27 MMOL/L (ref 22–29)
DIASTOLIC BLOOD PRESSURE - MUSE: NORMAL MMHG
DIASTOLIC BLOOD PRESSURE - MUSE: NORMAL MMHG
EGFRCR SERPLBLD CKD-EPI 2021: 90 ML/MIN/1.73M2
ERYTHROCYTE [DISTWIDTH] IN BLOOD BY AUTOMATED COUNT: 14.1 % (ref 10–15)
FERRITIN SERPL-MCNC: 185 NG/ML (ref 31–409)
FOLATE SERPL-MCNC: 8.7 NG/ML (ref 4.6–34.8)
GLUCOSE BLDC GLUCOMTR-MCNC: 180 MG/DL (ref 70–99)
GLUCOSE BLDC GLUCOMTR-MCNC: 185 MG/DL (ref 70–99)
GLUCOSE BLDC GLUCOMTR-MCNC: 198 MG/DL (ref 70–99)
GLUCOSE BLDC GLUCOMTR-MCNC: 219 MG/DL (ref 70–99)
GLUCOSE BLDC GLUCOMTR-MCNC: 236 MG/DL (ref 70–99)
GLUCOSE SERPL-MCNC: 168 MG/DL (ref 70–99)
HBA1C MFR BLD: 7.9 %
HCT VFR BLD AUTO: 38.4 % (ref 40–53)
HGB BLD-MCNC: 12.6 G/DL (ref 13.3–17.7)
INTERPRETATION ECG - MUSE: NORMAL
INTERPRETATION ECG - MUSE: NORMAL
IRON BINDING CAPACITY (ROCHE): 247 UG/DL (ref 240–430)
IRON SATN MFR SERPL: 9 % (ref 15–46)
IRON SERPL-MCNC: 22 UG/DL (ref 61–157)
LVEF ECHO: NORMAL
MCH RBC QN AUTO: 30.2 PG (ref 26.5–33)
MCHC RBC AUTO-ENTMCNC: 32.8 G/DL (ref 31.5–36.5)
MCV RBC AUTO: 92 FL (ref 78–100)
P AXIS - MUSE: 42 DEGREES
P AXIS - MUSE: 74 DEGREES
PLATELET # BLD AUTO: 210 10E3/UL (ref 150–450)
POTASSIUM SERPL-SCNC: 4.1 MMOL/L (ref 3.4–5.3)
PR INTERVAL - MUSE: 216 MS
PR INTERVAL - MUSE: 234 MS
PROT SERPL-MCNC: 7.1 G/DL (ref 6.4–8.3)
QRS DURATION - MUSE: 118 MS
QRS DURATION - MUSE: 118 MS
QT - MUSE: 390 MS
QT - MUSE: 396 MS
QTC - MUSE: 481 MS
QTC - MUSE: 490 MS
R AXIS - MUSE: -59 DEGREES
R AXIS - MUSE: -71 DEGREES
RADIOLOGIST FLAGS: ABNORMAL
RBC # BLD AUTO: 4.17 10E6/UL (ref 4.4–5.9)
SODIUM SERPL-SCNC: 137 MMOL/L (ref 135–145)
SYSTOLIC BLOOD PRESSURE - MUSE: NORMAL MMHG
SYSTOLIC BLOOD PRESSURE - MUSE: NORMAL MMHG
T AXIS - MUSE: 68 DEGREES
T AXIS - MUSE: 86 DEGREES
TROPONIN T SERPL HS-MCNC: 51 NG/L
TROPONIN T SERPL HS-MCNC: 54 NG/L
TROPONIN T SERPL HS-MCNC: 58 NG/L
VENTRICULAR RATE- MUSE: 89 BPM
VENTRICULAR RATE- MUSE: 95 BPM
VIT B12 SERPL-MCNC: 735 PG/ML (ref 232–1245)
WBC # BLD AUTO: 6 10E3/UL (ref 4–11)

## 2023-12-16 PROCEDURE — 99222 1ST HOSP IP/OBS MODERATE 55: CPT | Performed by: INTERNAL MEDICINE

## 2023-12-16 PROCEDURE — C8924 2D TTE W OR W/O FOL W/CON,FU: HCPCS

## 2023-12-16 PROCEDURE — 82607 VITAMIN B-12: CPT | Performed by: STUDENT IN AN ORGANIZED HEALTH CARE EDUCATION/TRAINING PROGRAM

## 2023-12-16 PROCEDURE — 93321 DOPPLER ECHO F-UP/LMTD STD: CPT | Mod: 26 | Performed by: INTERNAL MEDICINE

## 2023-12-16 PROCEDURE — 99418 PROLNG IP/OBS E/M EA 15 MIN: CPT | Performed by: STUDENT IN AN ORGANIZED HEALTH CARE EDUCATION/TRAINING PROGRAM

## 2023-12-16 PROCEDURE — 82728 ASSAY OF FERRITIN: CPT | Performed by: STUDENT IN AN ORGANIZED HEALTH CARE EDUCATION/TRAINING PROGRAM

## 2023-12-16 PROCEDURE — G0378 HOSPITAL OBSERVATION PER HR: HCPCS

## 2023-12-16 PROCEDURE — 250N000011 HC RX IP 250 OP 636: Mod: JZ | Performed by: STUDENT IN AN ORGANIZED HEALTH CARE EDUCATION/TRAINING PROGRAM

## 2023-12-16 PROCEDURE — 250N000011 HC RX IP 250 OP 636: Performed by: STUDENT IN AN ORGANIZED HEALTH CARE EDUCATION/TRAINING PROGRAM

## 2023-12-16 PROCEDURE — 99223 1ST HOSP IP/OBS HIGH 75: CPT | Mod: AI | Performed by: STUDENT IN AN ORGANIZED HEALTH CARE EDUCATION/TRAINING PROGRAM

## 2023-12-16 PROCEDURE — 82962 GLUCOSE BLOOD TEST: CPT

## 2023-12-16 PROCEDURE — 83550 IRON BINDING TEST: CPT | Performed by: STUDENT IN AN ORGANIZED HEALTH CARE EDUCATION/TRAINING PROGRAM

## 2023-12-16 PROCEDURE — 85379 FIBRIN DEGRADATION QUANT: CPT | Performed by: STUDENT IN AN ORGANIZED HEALTH CARE EDUCATION/TRAINING PROGRAM

## 2023-12-16 PROCEDURE — 36415 COLL VENOUS BLD VENIPUNCTURE: CPT | Performed by: STUDENT IN AN ORGANIZED HEALTH CARE EDUCATION/TRAINING PROGRAM

## 2023-12-16 PROCEDURE — G1010 CDSM STANSON: HCPCS

## 2023-12-16 PROCEDURE — 96372 THER/PROPH/DIAG INJ SC/IM: CPT | Performed by: STUDENT IN AN ORGANIZED HEALTH CARE EDUCATION/TRAINING PROGRAM

## 2023-12-16 PROCEDURE — 258N000003 HC RX IP 258 OP 636: Performed by: STUDENT IN AN ORGANIZED HEALTH CARE EDUCATION/TRAINING PROGRAM

## 2023-12-16 PROCEDURE — 93308 TTE F-UP OR LMTD: CPT | Mod: 26 | Performed by: INTERNAL MEDICINE

## 2023-12-16 PROCEDURE — 82746 ASSAY OF FOLIC ACID SERUM: CPT | Performed by: STUDENT IN AN ORGANIZED HEALTH CARE EDUCATION/TRAINING PROGRAM

## 2023-12-16 PROCEDURE — 250N000013 HC RX MED GY IP 250 OP 250 PS 637: Performed by: INTERNAL MEDICINE

## 2023-12-16 PROCEDURE — 84484 ASSAY OF TROPONIN QUANT: CPT | Performed by: EMERGENCY MEDICINE

## 2023-12-16 PROCEDURE — 250N000009 HC RX 250: Performed by: STUDENT IN AN ORGANIZED HEALTH CARE EDUCATION/TRAINING PROGRAM

## 2023-12-16 PROCEDURE — 80053 COMPREHEN METABOLIC PANEL: CPT | Performed by: STUDENT IN AN ORGANIZED HEALTH CARE EDUCATION/TRAINING PROGRAM

## 2023-12-16 PROCEDURE — 99207 PR APP CREDIT; MD BILLING SHARED VISIT: CPT | Performed by: INTERNAL MEDICINE

## 2023-12-16 PROCEDURE — 36415 COLL VENOUS BLD VENIPUNCTURE: CPT | Performed by: EMERGENCY MEDICINE

## 2023-12-16 PROCEDURE — 250N000012 HC RX MED GY IP 250 OP 636 PS 637: Performed by: INTERNAL MEDICINE

## 2023-12-16 PROCEDURE — 93325 DOPPLER ECHO COLOR FLOW MAPG: CPT | Mod: 26 | Performed by: INTERNAL MEDICINE

## 2023-12-16 PROCEDURE — 84484 ASSAY OF TROPONIN QUANT: CPT | Performed by: STUDENT IN AN ORGANIZED HEALTH CARE EDUCATION/TRAINING PROGRAM

## 2023-12-16 PROCEDURE — 250N000012 HC RX MED GY IP 250 OP 636 PS 637: Performed by: STUDENT IN AN ORGANIZED HEALTH CARE EDUCATION/TRAINING PROGRAM

## 2023-12-16 PROCEDURE — 250N000013 HC RX MED GY IP 250 OP 250 PS 637: Performed by: STUDENT IN AN ORGANIZED HEALTH CARE EDUCATION/TRAINING PROGRAM

## 2023-12-16 PROCEDURE — 120N000001 HC R&B MED SURG/OB

## 2023-12-16 PROCEDURE — 255N000002 HC RX 255 OP 636: Performed by: STUDENT IN AN ORGANIZED HEALTH CARE EDUCATION/TRAINING PROGRAM

## 2023-12-16 PROCEDURE — 999N000208 ECHOCARDIOGRAM LIMITED

## 2023-12-16 PROCEDURE — 85027 COMPLETE CBC AUTOMATED: CPT | Performed by: STUDENT IN AN ORGANIZED HEALTH CARE EDUCATION/TRAINING PROGRAM

## 2023-12-16 PROCEDURE — 96361 HYDRATE IV INFUSION ADD-ON: CPT

## 2023-12-16 PROCEDURE — 86140 C-REACTIVE PROTEIN: CPT | Performed by: STUDENT IN AN ORGANIZED HEALTH CARE EDUCATION/TRAINING PROGRAM

## 2023-12-16 RX ORDER — ACETAMINOPHEN 650 MG/1
650 SUPPOSITORY RECTAL EVERY 4 HOURS PRN
Status: DISCONTINUED | OUTPATIENT
Start: 2023-12-16 | End: 2023-12-17 | Stop reason: HOSPADM

## 2023-12-16 RX ORDER — METOPROLOL SUCCINATE 50 MG/1
50 TABLET, EXTENDED RELEASE ORAL DAILY
COMMUNITY

## 2023-12-16 RX ORDER — ATORVASTATIN CALCIUM 40 MG/1
40 TABLET, FILM COATED ORAL EVERY EVENING
Status: DISCONTINUED | OUTPATIENT
Start: 2023-12-16 | End: 2023-12-17 | Stop reason: HOSPADM

## 2023-12-16 RX ORDER — DEXTROSE MONOHYDRATE 25 G/50ML
25-50 INJECTION, SOLUTION INTRAVENOUS
Status: DISCONTINUED | OUTPATIENT
Start: 2023-12-16 | End: 2023-12-17 | Stop reason: HOSPADM

## 2023-12-16 RX ORDER — GLIPIZIDE 10 MG/1
10 TABLET ORAL
Status: ON HOLD | COMMUNITY
End: 2023-12-24

## 2023-12-16 RX ORDER — ENOXAPARIN SODIUM 150 MG/ML
1 INJECTION SUBCUTANEOUS EVERY 12 HOURS
Status: DISCONTINUED | OUTPATIENT
Start: 2023-12-16 | End: 2023-12-17 | Stop reason: HOSPADM

## 2023-12-16 RX ORDER — IBUPROFEN 200 MG
950 CAPSULE ORAL DAILY
Status: DISCONTINUED | OUTPATIENT
Start: 2023-12-16 | End: 2023-12-17 | Stop reason: HOSPADM

## 2023-12-16 RX ORDER — GUAIFENESIN 600 MG/1
600 TABLET, EXTENDED RELEASE ORAL 2 TIMES DAILY
Status: DISCONTINUED | OUTPATIENT
Start: 2023-12-16 | End: 2023-12-17 | Stop reason: HOSPADM

## 2023-12-16 RX ORDER — AMOXICILLIN 250 MG
1 CAPSULE ORAL 2 TIMES DAILY PRN
Status: DISCONTINUED | OUTPATIENT
Start: 2023-12-16 | End: 2023-12-17 | Stop reason: HOSPADM

## 2023-12-16 RX ORDER — NICOTINE POLACRILEX 4 MG
15-30 LOZENGE BUCCAL
Status: DISCONTINUED | OUTPATIENT
Start: 2023-12-16 | End: 2023-12-17 | Stop reason: HOSPADM

## 2023-12-16 RX ORDER — ONDANSETRON 2 MG/ML
4 INJECTION INTRAMUSCULAR; INTRAVENOUS EVERY 6 HOURS PRN
Status: DISCONTINUED | OUTPATIENT
Start: 2023-12-16 | End: 2023-12-17 | Stop reason: HOSPADM

## 2023-12-16 RX ORDER — METOPROLOL SUCCINATE 50 MG/1
50 TABLET, EXTENDED RELEASE ORAL DAILY
Status: DISCONTINUED | OUTPATIENT
Start: 2023-12-16 | End: 2023-12-17 | Stop reason: HOSPADM

## 2023-12-16 RX ORDER — ASPIRIN 81 MG/1
81 TABLET ORAL DAILY
Status: DISCONTINUED | OUTPATIENT
Start: 2023-12-16 | End: 2023-12-17 | Stop reason: HOSPADM

## 2023-12-16 RX ORDER — IOPAMIDOL 755 MG/ML
82 INJECTION, SOLUTION INTRAVASCULAR ONCE
Status: COMPLETED | OUTPATIENT
Start: 2023-12-16 | End: 2023-12-16

## 2023-12-16 RX ORDER — ASPIRIN 81 MG/1
81 TABLET ORAL DAILY
Status: ON HOLD | COMMUNITY
End: 2023-12-22

## 2023-12-16 RX ORDER — HYDRALAZINE HYDROCHLORIDE 20 MG/ML
10 INJECTION INTRAMUSCULAR; INTRAVENOUS EVERY 4 HOURS PRN
Status: DISCONTINUED | OUTPATIENT
Start: 2023-12-16 | End: 2023-12-17 | Stop reason: HOSPADM

## 2023-12-16 RX ORDER — ONDANSETRON 4 MG/1
4 TABLET, ORALLY DISINTEGRATING ORAL EVERY 6 HOURS PRN
Status: DISCONTINUED | OUTPATIENT
Start: 2023-12-16 | End: 2023-12-17 | Stop reason: HOSPADM

## 2023-12-16 RX ORDER — AMOXICILLIN 250 MG
2 CAPSULE ORAL 2 TIMES DAILY PRN
Status: DISCONTINUED | OUTPATIENT
Start: 2023-12-16 | End: 2023-12-17 | Stop reason: HOSPADM

## 2023-12-16 RX ORDER — SODIUM CHLORIDE 9 MG/ML
INJECTION, SOLUTION INTRAVENOUS CONTINUOUS
Status: DISCONTINUED | OUTPATIENT
Start: 2023-12-16 | End: 2023-12-16

## 2023-12-16 RX ORDER — ACETAMINOPHEN 325 MG/1
650 TABLET ORAL EVERY 4 HOURS PRN
Status: DISCONTINUED | OUTPATIENT
Start: 2023-12-16 | End: 2023-12-17 | Stop reason: HOSPADM

## 2023-12-16 RX ORDER — ATORVASTATIN CALCIUM 40 MG/1
40 TABLET, FILM COATED ORAL EVERY EVENING
Status: ON HOLD | COMMUNITY
End: 2023-12-17

## 2023-12-16 RX ADMIN — ACETAMINOPHEN 650 MG: 325 TABLET, FILM COATED ORAL at 09:14

## 2023-12-16 RX ADMIN — SODIUM CHLORIDE: 9 INJECTION, SOLUTION INTRAVENOUS at 02:00

## 2023-12-16 RX ADMIN — INSULIN ASPART 2 UNITS: 100 INJECTION, SOLUTION INTRAVENOUS; SUBCUTANEOUS at 10:32

## 2023-12-16 RX ADMIN — ACETAMINOPHEN 650 MG: 325 TABLET, FILM COATED ORAL at 17:06

## 2023-12-16 RX ADMIN — ENOXAPARIN SODIUM 120 MG: 150 INJECTION SUBCUTANEOUS at 18:56

## 2023-12-16 RX ADMIN — Medication 950 MG: at 08:58

## 2023-12-16 RX ADMIN — ACETAMINOPHEN 650 MG: 325 TABLET, FILM COATED ORAL at 23:46

## 2023-12-16 RX ADMIN — ENOXAPARIN SODIUM 120 MG: 150 INJECTION SUBCUTANEOUS at 06:03

## 2023-12-16 RX ADMIN — INSULIN ASPART 6 UNITS: 100 INJECTION, SOLUTION INTRAVENOUS; SUBCUTANEOUS at 15:06

## 2023-12-16 RX ADMIN — INSULIN ASPART 8 UNITS: 100 INJECTION, SOLUTION INTRAVENOUS; SUBCUTANEOUS at 17:07

## 2023-12-16 RX ADMIN — GUAIFENESIN 600 MG: 600 TABLET, EXTENDED RELEASE ORAL at 08:58

## 2023-12-16 RX ADMIN — HUMAN ALBUMIN MICROSPHERES AND PERFLUTREN 9 ML: 10; .22 INJECTION, SOLUTION INTRAVENOUS at 13:32

## 2023-12-16 RX ADMIN — SODIUM CHLORIDE 100 ML: 9 INJECTION, SOLUTION INTRAVENOUS at 04:54

## 2023-12-16 RX ADMIN — INSULIN GLARGINE 65 UNITS: 100 INJECTION, SOLUTION SUBCUTANEOUS at 20:59

## 2023-12-16 RX ADMIN — GUAIFENESIN 600 MG: 600 TABLET, EXTENDED RELEASE ORAL at 20:58

## 2023-12-16 RX ADMIN — ASPIRIN 81 MG: 81 TABLET, COATED ORAL at 15:13

## 2023-12-16 RX ADMIN — ACETAMINOPHEN 650 MG: 325 TABLET, FILM COATED ORAL at 02:19

## 2023-12-16 RX ADMIN — IOPAMIDOL 82 ML: 755 INJECTION, SOLUTION INTRAVENOUS at 04:50

## 2023-12-16 RX ADMIN — METOPROLOL SUCCINATE 50 MG: 50 TABLET, EXTENDED RELEASE ORAL at 15:13

## 2023-12-16 ASSESSMENT — ACTIVITIES OF DAILY LIVING (ADL)
ADLS_ACUITY_SCORE: 30
ADLS_ACUITY_SCORE: 35
ADLS_ACUITY_SCORE: 30

## 2023-12-16 NOTE — PLAN OF CARE
Date & Time: 12/16/23, 2175-2622  Summary:  Fall due to syncopal episode   Behavior & Aggression: green  Fall Risk: yes  Orientation: AxOx4  ABNL VS/O2:  ABNL Labs: trop: 54  Pain Management: tylenol for head  Bowel/Bladder: continent   Drains: PIV infusing 75mL/hr  Diet: soft mechanical   Activity Level: SBA  Tests/Procedures: waiting results from chest CT   Anticipated  DC Date:   Significant Information:  Patient has laceration on lip, fractured nose, has been seen by oral and maxillofacial surgery. COVID+. Positive for small PE Right middle lobe, MD notified

## 2023-12-16 NOTE — PROGRESS NOTES
[facility]    Hospitalist Progress Note    Assessment & Plan   78M hx of T2D, HTN HLD presenting with syncopal episode following severe coughing. Found to have facial bone fx along with covid.    Right middle lobe pulmonary embolism-segmental and subsegmental  Syncope  Atrial fibrillation, paroxysmal.  COVID 19 infection  Patient presents following a syncopal episode that resulted in a fall and injury on his face, he also had a bout of heavy coughing prior to the syncopal event, patient had 3 weeks worth of persistent cough and cold symptoms, he was diagnosed with COVID-19 here.  He also had another bradycardia and syncopal episode in the ER.  --He was noted to have A-fib on telemetry briefly, Tmax 100.9.,  Troponins are mildly elevated .CT chest showed segmental subsegmental PE, normal CT head and spine, facial bone fracture noted.  --Continue on cardiac telemetry, PT to evaluate the patient, as patient seen by cardiology team, they are recommending echocardiogram and anticoagulation for [id number].  --Patient is currently on Lovenox 1 mg/kg body weight twice daily, will transition to DOAC's, pharmacy liaison consult requested, will stop IV fluids, echocardiogram pending.  -- Restarted PTA metoprolol.  --Patient started on Paxlovid, patient is not on oxygen now, deferring steroids.  --Continue COVID labs.  If patient is stable possibly could discharge to home 12/17 after echo results available.  LeFort I fracture and nasal bone fractures    Bilateral mildly displaced LeFort type I fractures.Mildly displaced and impacted nasal bone fractures with involvement of the osseous nasal septum. Overlying soft tissue swelling. Bilateral symmetric anterior subluxation of the mandibular condyle with respect to the mandibular fossa. Posttraumatic intrasinus hemorrhage.  --soft diet  --outpt Oral surgery vs ENT  --OMFS cleared the patient for Discharge   --Tylenol for pain  "control  Hyponatremia  --Improved with fluids, will stop IV fluids and monitor  Chronic back pain  Obesity  --Lifestyle modification recommended  T2D  --hold glipizide/meformin  --ISS  -- Patient is on high-dose home insulin, restarted that.     HLD  --resume atorvastatin   HTN  Bifascular block with First degree AV block  -Reviewed by cardiology team.  DVT Prophylaxis: Enoxaparin (Lovenox) SQ  Code Status: Full Code     35 MINUTES SPENT BY ME on the date of service doing chart review, history, exam, documentation & further activities per the note.  Disposition: Expected discharge possibly 12/17  Clinically Significant Risk Factors Present on Admission          # Hypocalcemia: Lowest Ca = 8.2 mg/dL in last 2 days, will monitor and replace as appropriate       # Drug Induced Platelet Defect: home medication list includes an antiplatelet medication       # DMII: A1C = 7.9 % (Ref range: <5.7 %) within past 6 months    # Obesity: Estimated body mass index is 34.36 kg/m  as calculated from the following:    Height as of this encounter: 1.905 m (6' 3\").    Weight as of this encounter: 124.7 kg (274 lb 14.6 oz).              Shelby Mayberry MD  Text Page   (7am to 6pm)    Interval History   Patient has pain on his face from the fall, as per nursing he is slightly unsteady on his feet while walking to the restroom, he wanted to go home today, discussed about getting pharmacy liaison consult on DOAC's for transitioning regarding anticoagulation, cardiology is also recommending anticoagulation since his [id number] 2 score is above 4.    -Data reviewed today: I reviewed all new labs and imaging results over the last 24 hours.     Physical Exam     Vital Signs with Ranges  Temp:  [97.5  F (36.4  C)-100.9  F (38.3  C)] 97.9  F (36.6  C)  Pulse:  [] 104  Resp:  [16-18] 18  BP: (113-129)/(69-87) 124/87  SpO2:  [94 %-99 %] 99 %  I/O last 3 completed shifts:  In: 1000 [IV Piggyback:1000]  Out: -     Constitutional: Awake, " alert, cooperative, no apparent distress  Respiratory: Clear to auscultation bilaterally, no crackles or wheezing  Cardiovascular: Regular rate and rhythm, normal S1 and S2, and no murmur noted  GI: Normal bowel sounds, soft, non-distended, non-tender  Skin/Integumen: Ecchymosis noted on the right side of his face, mild swelling present.  Neuro : moving all 4 extremities, no focal deficit noted     Medications    sodium chloride 75 mL/hr at 12/16/23 0200      calcium citrate  950 mg Oral Daily    enoxaparin ANTICOAGULANT  1 mg/kg Subcutaneous Q12H    guaiFENesin  600 mg Oral BID    insulin aspart   Subcutaneous TID w/meals    insulin aspart  1-10 Units Subcutaneous TID AC    insulin aspart  1-7 Units Subcutaneous At Bedtime    nirmatrelvir and ritonavir  3 tablet Oral BID       Data   Recent Labs   Lab 12/16/23  1252 12/16/23  0857 12/16/23  0830 12/15/23  2334 12/15/23  1734   WBC  --   --  6.0  --  10.1   HGB  --   --  12.6*  --  13.0*   MCV  --   --  92  --  92   PLT  --   --  210  --  230   NA  --   --  137  --  131*   POTASSIUM  --   --  4.1  --  4.0   CHLORIDE  --   --  102  --  96*   CO2  --   --  27  --  25   BUN  --   --  11.4  --  11.7   CR  --   --  0.84  --  0.95   ANIONGAP  --   --  8  --  10   RHONDA  --   --  8.2*  --  8.5*   * 180* 168*   < > 135*   ALBUMIN  --   --  3.7  --  3.8   PROTTOTAL  --   --  7.1  --  7.5   BILITOTAL  --   --  0.4  --  0.8   ALKPHOS  --   --  76  --  84   ALT  --   --  24  --  27   AST  --   --  26  --  31    < > = values in this interval not displayed.     Recent Labs   Lab 12/16/23  1252 12/16/23  0857 12/16/23  0830 12/15/23  2334 12/15/23  1734   * 180* 168* 171* 135*       Imaging:   Recent Results (from the past 24 hour(s))   CT Facial Bones without Contrast    Narrative    EXAM: CT HEAD W/O CONTRAST, CT CERVICAL SPINE W/O CONTRAST, CT FACIAL BONES WITHOUT CONTRAST  LOCATION: [facility]  DATE/TIME: 12/15/2023 8:23 PM  CST    INDICATION: fell, hit head, LOC  COMPARISON: None.  TECHNIQUE:   1) Routine CT Head without IV contrast. Multiplanar reformats. Dose reduction techniques were used.  2) Routine CT Facial Bones without IV contrast. Multiplanar reformats. Dose reduction techniques were used.  3) Routine CT Cervical Spine without IV contrast. Multiplanar reformats. Dose reduction techniques were used.    FINDINGS:  HEAD CT:   INTRACRANIAL CONTENTS: No intracranial hemorrhage, extraaxial collection, or mass effect.  No CT evidence of acute infarct. Mild presumed chronic small vessel ischemic changes. Mild to moderate generalized volume loss. No hydrocephalus.     BONES/SOFT TISSUES: No acute abnormality.    FACIAL BONE CT:   OSSEOUS STRUCTURES/SOFT TISSUES:  Frontal and nasal soft tissue swelling/inflammation. Mildly displaced and impacted nasal bone fractures with involvement of the osseous nasal septum. Bilateral mildly displaced Le Fort I fractures.  Bilateral symmetric anterior mandibular subluxation.    ORBITAL CONTENTS: No intraorbital abnormality.     SINUSES:  Scattered paranasal sinus mucosal thickening as well as hemorrhage.     CERVICAL SPINE CT:  VERTEBRA: Normal vertebral body heights and alignment. No acute compression fracture or posttraumatic subluxation.     CANAL/FORAMINA: Multilevel spondylosis without high grade canal stenosis.    PARASPINAL: No prevertebral edema. Incidental superficial lipoma in the posterior paraspinal soft tissues.      Impression    IMPRESSION:    HEAD CT:  1.  No acute intracranial process.    FACIAL BONE CT:  1.  Bilateral mildly displaced LeFort type I fractures.  2.  Mildly displaced and impacted nasal bone fractures with involvement of the osseous nasal septum. Overlying soft tissue swelling.  3.  Bilateral symmetric anterior subluxation of the mandibular condyle with respect to the mandibular fossa. This could be physiologic or posttraumatic. Correlate with physical examination.  4.   Posttraumatic intrasinus hemorrhage.    CERVICAL SPINE CT:  1.  No acute cervical spine fracture.   Head CT w/o contrast    Narrative    EXAM: CT HEAD W/O CONTRAST, CT CERVICAL SPINE W/O CONTRAST, CT FACIAL BONES WITHOUT CONTRAST  LOCATION: [facility]  DATE/TIME: 12/15/2023 8:23 PM CST    INDICATION: fell, hit head, LOC  COMPARISON: None.  TECHNIQUE:   1) Routine CT Head without IV contrast. Multiplanar reformats. Dose reduction techniques were used.  2) Routine CT Facial Bones without IV contrast. Multiplanar reformats. Dose reduction techniques were used.  3) Routine CT Cervical Spine without IV contrast. Multiplanar reformats. Dose reduction techniques were used.    FINDINGS:  HEAD CT:   INTRACRANIAL CONTENTS: No intracranial hemorrhage, extraaxial collection, or mass effect.  No CT evidence of acute infarct. Mild presumed chronic small vessel ischemic changes. Mild to moderate generalized volume loss. No hydrocephalus.     BONES/SOFT TISSUES: No acute abnormality.    FACIAL BONE CT:   OSSEOUS STRUCTURES/SOFT TISSUES:  Frontal and nasal soft tissue swelling/inflammation. Mildly displaced and impacted nasal bone fractures with involvement of the osseous nasal septum. Bilateral mildly displaced Le Fort I fractures.  Bilateral symmetric anterior mandibular subluxation.    ORBITAL CONTENTS: No intraorbital abnormality.     SINUSES:  Scattered paranasal sinus mucosal thickening as well as hemorrhage.     CERVICAL SPINE CT:  VERTEBRA: Normal vertebral body heights and alignment. No acute compression fracture or posttraumatic subluxation.     CANAL/FORAMINA: Multilevel spondylosis without high grade canal stenosis.    PARASPINAL: No prevertebral edema. Incidental superficial lipoma in the posterior paraspinal soft tissues.      Impression    IMPRESSION:    HEAD CT:  1.  No acute intracranial process.    FACIAL BONE CT:  1.  Bilateral mildly displaced LeFort type I fractures.  2.  Mildly  displaced and impacted nasal bone fractures with involvement of the osseous nasal septum. Overlying soft tissue swelling.  3.  Bilateral symmetric anterior subluxation of the mandibular condyle with respect to the mandibular fossa. This could be physiologic or posttraumatic. Correlate with physical examination.  4.  Posttraumatic intrasinus hemorrhage.    CERVICAL SPINE CT:  1.  No acute cervical spine fracture.   CT Cervical Spine w/o Contrast    Narrative    EXAM: CT HEAD W/O CONTRAST, CT CERVICAL SPINE W/O CONTRAST, CT FACIAL BONES WITHOUT CONTRAST  LOCATION: [facility]  DATE/TIME: 12/15/2023 8:23 PM CST    INDICATION: fell, hit head, LOC  COMPARISON: None.  TECHNIQUE:   1) Routine CT Head without IV contrast. Multiplanar reformats. Dose reduction techniques were used.  2) Routine CT Facial Bones without IV contrast. Multiplanar reformats. Dose reduction techniques were used.  3) Routine CT Cervical Spine without IV contrast. Multiplanar reformats. Dose reduction techniques were used.    FINDINGS:  HEAD CT:   INTRACRANIAL CONTENTS: No intracranial hemorrhage, extraaxial collection, or mass effect.  No CT evidence of acute infarct. Mild presumed chronic small vessel ischemic changes. Mild to moderate generalized volume loss. No hydrocephalus.     BONES/SOFT TISSUES: No acute abnormality.    FACIAL BONE CT:   OSSEOUS STRUCTURES/SOFT TISSUES:  Frontal and nasal soft tissue swelling/inflammation. Mildly displaced and impacted nasal bone fractures with involvement of the osseous nasal septum. Bilateral mildly displaced Le Fort I fractures.  Bilateral symmetric anterior mandibular subluxation.    ORBITAL CONTENTS: No intraorbital abnormality.     SINUSES:  Scattered paranasal sinus mucosal thickening as well as hemorrhage.     CERVICAL SPINE CT:  VERTEBRA: Normal vertebral body heights and alignment. No acute compression fracture or posttraumatic subluxation.     CANAL/FORAMINA: Multilevel  spondylosis without high grade canal stenosis.    PARASPINAL: No prevertebral edema. Incidental superficial lipoma in the posterior paraspinal soft tissues.      Impression    IMPRESSION:    HEAD CT:  1.  No acute intracranial process.    FACIAL BONE CT:  1.  Bilateral mildly displaced LeFort type I fractures.  2.  Mildly displaced and impacted nasal bone fractures with involvement of the osseous nasal septum. Overlying soft tissue swelling.  3.  Bilateral symmetric anterior subluxation of the mandibular condyle with respect to the mandibular fossa. This could be physiologic or posttraumatic. Correlate with physical examination.  4.  Posttraumatic intrasinus hemorrhage.    CERVICAL SPINE CT:  1.  No acute cervical spine fracture.   XR Chest Port 1 View    Narrative    EXAM: XR CHEST PORT 1 VIEW  LOCATION: [facility]  DATE: 12/15/2023    INDICATION: Cough and shortness of breath.  COMPARISON: None available.      Impression    IMPRESSION: Mild left basilar atelectasis and possible small left pleural effusion. No pneumothorax.    Upper limits of normal heart size. Atherosclerosis of the thoracic aorta.   CT Chest Pulmonary Embolism w Contrast   Result Value    Radiologist flags New diagnosis of pulmonary embolism (AA)    Narrative    EXAM: CT CHEST PULMONARY EMBOLISM W CONTRAST  LOCATION: [facility]  DATE: 12/16/2023    INDICATION: tachycardic, Trop elevated, syncope. r o PE  COMPARISON: 12/15/2023  TECHNIQUE: CT chest pulmonary angiogram during arterial phase injection of IV contrast. Multiplanar reformats and MIP reconstructions were performed. Dose reduction techniques were used.   CONTRAST: 82  ml Isovue 370    FINDINGS:  ANGIOGRAM CHEST: There are small pulmonary emboli in the right middle lobe medial segment (images 165 - 177 of series 3). No central or saddle embolism. Thoracic aorta is negative for dissection. No CT evidence of right heart strain.    LUNGS  AND PLEURA: Dependent subsegmental atelectasis in both lungs. Calcified right basilar granuloma. No pleural effusion.    MEDIASTINUM/AXILLAE: Normal heart size. No pericardial effusion.    CORONARY ARTERY CALCIFICATION: Mild.    UPPER ABDOMEN: Calcified splenic granulomas.    MUSCULOSKELETAL: Normal.      Impression    IMPRESSION:  1.  Examination positive for segmental and subsegmental right middle lobe pulmonary embolism. No central embolism.      [Critical Result: New diagnosis of pulmonary embolism]    Finding was identified on 12/16/2023 5:06 AM CST.     1.  Alley, the patient's nurse, was contacted by me on 12/16/2023 5:26 AM CST and verbalized understanding of the critical result.

## 2023-12-16 NOTE — PHARMACY-ADMISSION MEDICATION HISTORY
Pharmacy Intern Admission Medication History    Admission medication history is complete. The information provided in this note is only as accurate as the sources available at the time of the update.    Information Source(s): Patient and CareEverywhere/SureScripts via phone    Pertinent Information: None    Changes made to PTA medication list:  Added: All  Deleted: None  Changed: None    Medication Affordability:  Not including over the counter (OTC) medications, was there a time in the past 3 months when you did not take your medications as prescribed because of cost?: No    Allergies reviewed with patient and updates made in EHR: yes    Medication History Completed By: Elsa Phelps 12/16/2023 11:51 AM    PTA Med List   Medication Sig Last Dose    aspirin 81 MG EC tablet Take 81 mg by mouth daily 12/14/2023    atorvastatin (LIPITOR) 40 MG tablet Take 40 mg by mouth every evening 12/14/2023    glipiZIDE (GLUCOTROL) 10 MG tablet Take 10 mg by mouth 2 times daily (before meals) 12/14/2023    insulin glargine (LANTUS PEN) 100 UNIT/ML pen Inject 65 Units Subcutaneous 2 times daily 12/14/2023    metFORMIN (GLUCOPHAGE) 500 MG tablet Take 1,000 mg by mouth 2 times daily (with meals) 12/14/2023    metoprolol succinate ER (TOPROL XL) 50 MG 24 hr tablet Take 50 mg by mouth daily 12/14/2023

## 2023-12-16 NOTE — PROGRESS NOTES
RECEIVING UNIT ED HANDOFF REVIEW    ED Nurse Handoff Report was reviewed by: Alley Callahan RN on December 16, 2023 at 1:07 AM

## 2023-12-16 NOTE — PROVIDER NOTIFICATION
MD Notification    Notified Person: MD    Notified Person Name: Keaton    Notification Date/Time: 12/16/23, 0529    Notification Interaction: vocera    Purpose of Notification: [facility]ology said positive for small PE Right middle lobe    Orders Received:    Comments:

## 2023-12-16 NOTE — CONSULTS
Oral & Maxillofacial Surgery Consultation      A/P: Shola Lemus is a 78 year old year old male fell w/ cough-induced syncope who sustained mildly displaced facial fractures ( LeFort I fracture and nasal bone fractures) that are non-operative.  1. Soft diet x6 weeks  2. Motrin/tylenol for pain mgt  3. Okay to discharge to home.  F/u as needed with oral surgery as well as ENT (if in 3 weeks after swelling subsides he finds a cosmetic issue - unlikely)    -----------------------------------------------------------    HPI: 78 year old year old male fell on face due to syncopal episode apparently induced by extreme coughing fit.  +LOC.  Patient notes his bite feels normal except for #8 seems to hit slightly more than it used to.  He denies significant jaw or tooth pain.  Denies dyphagia or trismus.    REVIEW OF SYSTEMS:  ROS: A 12 pt review of systems was conducted. Patient denies any recent changes EXCEPT FOR facial injuries.    PMH  Past Medical History:   Diagnosis Date     Concussion 20 years ago     Diabetes mellitus (H)      Hypercholesteremia      Hypertension        PSH  Past Surgical History:   Procedure Laterality Date     HEAD & NECK SURGERY      tonsillectomy     HERNIA REPAIR      bilat       CURRENT MEDS     No current facility-administered medications for this encounter.     Current Outpatient Medications   Medication     ASPIRIN PO     ATORVASTATIN CALCIUM PO     GLIPIZIDE PO     insulin glargine (LANTUS) 100 UNIT/ML injection     METFORMIN HCL PO     METOPROLOL SUCCINATE PO     oxyCODONE-acetaminophen (PERCOCET) 5-325 MG per tablet       ALLERGIES/SENSITIVITIES  No Known Allergies    SOC HX  Social History     Socioeconomic History     Marital status:      Spouse name: Not on file     Number of children: Not on file     Years of education: Not on file     Highest education level: Not on file   Occupational History     Not on file   Tobacco Use     Smoking status: Never     Smokeless  tobacco: Never   Substance and Sexual Activity     Alcohol use: Yes     Comment: one drink every 2 weeks     Drug use: No     Sexual activity: Not on file   Other Topics Concern     Not on file   Social History Narrative     Not on file     Social Determinants of Health     Financial Resource Strain: Not on file   Food Insecurity: Not on file   Transportation Needs: Not on file   Physical Activity: Not on file   Stress: Not on file   Social Connections: Not on file   Interpersonal Safety: Not on file   Housing Stability: Not on file       FAM HX  No family history on file.     EXAM:  Temp:  [100.9  F (38.3  C)] 100.9  F (38.3  C)  Pulse:  [101] 101  Resp:  [16] 16  BP: (113)/(69) 113/69  SpO2:  [94 %] 94 %  Temp (24hrs), Av.9  F (38.3  C), Min:100.9  F (38.3  C), Max:100.9  F (38.3  C)       Intake/Output Summary (Last 24 hours) at 12/15/2023 2250  Last data filed at 12/15/2023 2054  Gross per 24 hour   Intake 1000 ml   Output --   Net 1000 ml     Date 12/15/23 0700 - 23 0659   Shift 2419-1764 [id number] 3748-2822 24 Hour Total   INTAKE   IV Piggyback  1000  1000   Shift Total(mL/kg)  1000(8.02)  1000(8.02)   OUTPUT   Shift Total(mL/kg)       Weight (kg)  124.74 124.74 124.74        Gen: lying in bed, alert, oriented x 3, NAD    HEENT  E/O  Mild edema of upper lip.  No gross facial deformaties or asymmetries.  Eyes: EOMI, PERRL, No double vision  Ears: external ears atraumatic, gross auditory function intact  External nose with bilateral mild edema/abrasion, nares with some dried blood but patent.  No septal hematoma noted.  Neck: Trachea midline, no cervical lymphandenopathy  TMJ: BEN 40mm.  No deviation or deflection.  CN V3 bilateral: intact to light touch  I/O  Oral mucosa intact except for small 4mm laceration inside wet dry line at left upper lip mucosa near midline.  Not warranting sutures.  Hard/soft palate: no edema, no edema, uvula: visualized, non deviated  Tongue: no edema, non raised,  no  ulcerations   FOM: soft, non tender, no ecchymosis  No mobility of maxilla/mandible.  Occlusion is stable and reproducible.  Bilateral contacts, no open bites.  No premature contacts visible.  No fractured teeth or alveolar step-off/lacerations/eccyhmosis    Radiography:  FACIAL BONE CT:  1.  Bilateral mildly displaced LeFort type I fractures.  2.  Mildly displaced and impacted nasal bone fractures with involvement of the osseous nasal septum. Overlying soft tissue swelling.  3.  Bilateral symmetric anterior subluxation of the mandibular condyle with respect to the mandibular fossa. This could be physiologic or posttraumatic. Correlate with physical examination.  4.  Posttraumatic intrasinus hemorrhage.    LABS   Lab Results   Component Value Date    WBC 10.1 12/15/2023     Lab Results   Component Value Date    RBC 4.36 12/15/2023     Lab Results   Component Value Date    HGB 13.0 12/15/2023     Lab Results   Component Value Date    HCT 39.9 12/15/2023     Lab Results   Component Value Date    MCV 92 12/15/2023     Lab Results   Component Value Date    MCH 29.8 12/15/2023     Lab Results   Component Value Date    MCHC 32.6 12/15/2023     Lab Results   Component Value Date    RDW 13.8 12/15/2023     Lab Results   Component Value Date     12/15/2023       --------------------------------------------------------    Signature:  Chya Waters DDS    [facility]  [address], [address]  [place] [address]  Clinic/On-call Phone: [phone]  Clinic Fax: [phone]

## 2023-12-16 NOTE — CONSULTS
[facility]    Cardiology Consultation     Date of Admission:  12/15/2023    Assessment & Plan   Shola Lemus is a 78 year old male who was admitted on 12/15/2023.      Syncope.  Sounds like cough induced syncope.  On telemetry no major culprit arrhythmia noted so far.  Brief episode of newly diagnosed atrial fibrillation last night.  Recommend continue telemetry.  Chest Vascor of 4.  COVID with PE.  On anticoagulation.  Left atrial fascicular block versus bifascicular block on EKG not new.  No significant bradycardia arrhythmia noted so far on telemetry  While troponin elevation flat pattern, no chest pain, clinically does not appear to be acute coronary syndrome, likely in the setting of underlying COVID and PE.    Recommendations  Continue telemetry  Agree with echocardiogram  Overall this appears to be a cough induced syncope, less likely arrhythmia induced syncope.  Agree with anticoagulation, will defer to internal medicine team choice of NOAC for long-term anticoagulation.        Moderate complexity    Peewee Kraft MD, MD    Primary Care Physician   Bryan Patrick    Reason for Consult   Reason for consult: I was asked to evaluate this patient for syncope.      History of Present Illness   Shola Lemus is a 78 year old male who presents with syncope. Patient was admitted with the an episode of syncope this happened in the context of severe bouts of cough which she has been struggling for the last several days.  He had a bad cold.  He also had another episode of near syncope on standing up yesterday.  He was found to have segmental PE on CT chest.  His baseline EKG shows left intrafascicular block sinus rhythm with first-degree AV block, sometimes bifascicular block.  Telemetry shows brief episode of atrial fibrillation last night.  No bradycardia arrhythmia noted.  He denies any other episode of syncope.  No chest discomfort.  High sensitive bili is mildly  elevated, flat pattern.    Past Medical History   Past Medical History:   Diagnosis Date    Concussion 20 years ago    Diabetes mellitus (H)     Hypercholesteremia     Hypertension          Past Surgical History   Past Surgical History:   Procedure Laterality Date    HEAD & NECK SURGERY      tonsillectomy    HERNIA REPAIR      bilat         Prior to Admission Medications   Prior to Admission Medications   Prescriptions Last Dose Informant Patient Reported? Taking?   ASPIRIN PO   Yes No   Sig: Take 81 mg by mouth   ATORVASTATIN CALCIUM PO   Yes No   Sig: Take  by mouth.     GLIPIZIDE PO   Yes No   Sig: Take  by mouth.     METFORMIN HCL PO   Yes No   Sig: Take  by mouth.     METOPROLOL SUCCINATE PO   Yes No   Sig: Take  by mouth.     insulin glargine (LANTUS) 100 UNIT/ML injection   Yes No   Sig: Inject 65 Units Subcutaneous daily   oxyCODONE-acetaminophen (PERCOCET) 5-325 MG per tablet   No No   Sig: Take 1-2 tablets by mouth every 4 hours as needed for pain      Facility-Administered Medications: None     Current Facility-Administered Medications   Medication Dose Route Frequency    calcium citrate  950 mg Oral Daily    enoxaparin ANTICOAGULANT  1 mg/kg Subcutaneous Q12H    guaiFENesin  600 mg Oral BID    insulin aspart   Subcutaneous TID w/meals    insulin aspart  1-10 Units Subcutaneous TID AC    insulin aspart  1-7 Units Subcutaneous At Bedtime    nirmatrelvir and ritonavir  3 tablet Oral BID     Current Facility-Administered Medications   Medication Last Rate    sodium chloride 75 mL/hr at 12/16/23 0200     Allergies   No Known Allergies    Social History    reports that he has never smoked. He has never used smokeless tobacco. He reports current alcohol use. He reports that he does not use drugs.      Family History            Review of Systems   A comprehensive review of system was performed and is negative other than that noted in the HPI or here.     Physical Exam   Vital Signs with Ranges  Temp:  [97.5  F  "(36.4  C)-100.9  F (38.3  C)] 98.6  F (37  C)  Pulse:  [] 87  Resp:  [16] 16  BP: (113-129)/(69-76) 117/74  SpO2:  [94 %-96 %] 96 %  Wt Readings from Last 4 Encounters:   12/16/23 124.7 kg (274 lb 14.6 oz)   06/03/22 124.7 kg (275 lb)   06/21/21 121.6 kg (268 lb)   08/06/19 117.9 kg (260 lb)     I/O last 3 completed shifts:  In: 1000 [IV Piggyback:1000]  Out: -       Vitals: /74 (BP Location: Right arm, Patient Position: Semi-Hernández's, Cuff Size: Adult Large)   Pulse 87   Temp 98.6  F (37  C) (Oral)   Resp 16   Ht 1.905 m (6' 3\")   Wt 124.7 kg (274 lb 14.6 oz)   SpO2 96%   BMI 34.36 kg/m      Physical Exam:   General - Alert and oriented to time place and person in no acute distress  Eyes - No scleral icterus  HEENT - Neck supple, moist mucous membranes  Cardiovascular -S1-S2 normal no murmur rub or  Extremities - There is no pitting pedal edema  Respiratory -clear to auscultation      No lab results found in last 7 days.    Invalid input(s): \"TROPONINIES\"    Recent Labs   Lab 12/16/23  0857 12/16/23  0830 12/15/23  2334 12/15/23  1734   WBC  --  6.0  --  10.1   HGB  --  12.6*  --  13.0*   MCV  --  92  --  92   PLT  --  210  --  230   NA  --  137  --  131*   POTASSIUM  --  4.1  --  4.0   CHLORIDE  --  102  --  96*   CO2  --  27  --  25   BUN  --  11.4  --  11.7   CR  --  0.84  --  0.95   GFRESTIMATED  --  90  --  83   ANIONGAP  --  8  --  10   RHONDA  --  8.2*  --  8.5*   * 168* 171* 135*   ALBUMIN  --  3.7  --  3.8   PROTTOTAL  --  7.1  --  7.5   BILITOTAL  --  0.4  --  0.8   ALKPHOS  --  76  --  84   ALT  --  24  --  27   AST  --  26  --  31     No results for input(s): \"CHOL\", \"HDL\", \"LDL\", \"TRIG\", \"CHOLHDLRATIO\" in the last 41912 hours.  Recent Labs   Lab 12/16/23  0830 12/16/23  0103 12/15/23  1734   WBC 6.0  --  10.1   HGB 12.6*  --  13.0*   HCT 38.4*  --  39.9*   MCV 92  --  92     --  230   IRON  --  22*  --    IRONSAT  --  9*  --    FEB  --  247  --    GILBERTO  --  185  --    B12  " "--  735  --      Recent Labs   Lab 12/15/23  1729   PHV 7.41   PO2V 42   PCO2V 36*   HCO3V 23     No results for input(s): \"NTBNPI\", \"NTBNP\" in the last 168 hours.  Recent Labs   Lab 12/16/23  0830   DD 1.58*     No results for input(s): \"SED\", \"CRP\" in the last 168 hours.  Recent Labs   Lab 12/16/23  0830 12/15/23  1734    230     No results for input(s): \"TSH\" in the last 168 hours.  No results for input(s): \"COLOR\", \"APPEARANCE\", \"URINEGLC\", \"URINEBILI\", \"URINEKETONE\", \"SG\", \"UBLD\", \"URINEPH\", \"PROTEIN\", \"UROBILINOGEN\", \"NITRITE\", \"LEUKEST\", \"RBCU\", \"WBCU\" in the last 168 hours.    Imaging:  Recent Results (from the past 48 hour(s))   CT Facial Bones without Contrast    Narrative    EXAM: CT HEAD W/O CONTRAST, CT CERVICAL SPINE W/O CONTRAST, CT FACIAL BONES WITHOUT CONTRAST  LOCATION: [facility]  DATE/TIME: 12/15/2023 8:23 PM CST    INDICATION: fell, hit head, LOC  COMPARISON: None.  TECHNIQUE:   1) Routine CT Head without IV contrast. Multiplanar reformats. Dose reduction techniques were used.  2) Routine CT Facial Bones without IV contrast. Multiplanar reformats. Dose reduction techniques were used.  3) Routine CT Cervical Spine without IV contrast. Multiplanar reformats. Dose reduction techniques were used.    FINDINGS:  HEAD CT:   INTRACRANIAL CONTENTS: No intracranial hemorrhage, extraaxial collection, or mass effect.  No CT evidence of acute infarct. Mild presumed chronic small vessel ischemic changes. Mild to moderate generalized volume loss. No hydrocephalus.     BONES/SOFT TISSUES: No acute abnormality.    FACIAL BONE CT:   OSSEOUS STRUCTURES/SOFT TISSUES:  Frontal and nasal soft tissue swelling/inflammation. Mildly displaced and impacted nasal bone fractures with involvement of the osseous nasal septum. Bilateral mildly displaced [place] I fractures.  Bilateral symmetric anterior mandibular subluxation.    ORBITAL CONTENTS: No intraorbital abnormality.     SINUSES:  " Scattered paranasal sinus mucosal thickening as well as hemorrhage.     CERVICAL SPINE CT:  VERTEBRA: Normal vertebral body heights and alignment. No acute compression fracture or posttraumatic subluxation.     CANAL/FORAMINA: Multilevel spondylosis without high grade canal stenosis.    PARASPINAL: No prevertebral edema. Incidental superficial lipoma in the posterior paraspinal soft tissues.      Impression    IMPRESSION:    HEAD CT:  1.  No acute intracranial process.    FACIAL BONE CT:  1.  Bilateral mildly displaced LeFort type I fractures.  2.  Mildly displaced and impacted nasal bone fractures with involvement of the osseous nasal septum. Overlying soft tissue swelling.  3.  Bilateral symmetric anterior subluxation of the mandibular condyle with respect to the mandibular fossa. This could be physiologic or posttraumatic. Correlate with physical examination.  4.  Posttraumatic intrasinus hemorrhage.    CERVICAL SPINE CT:  1.  No acute cervical spine fracture.   Head CT w/o contrast    Narrative    EXAM: CT HEAD W/O CONTRAST, CT CERVICAL SPINE W/O CONTRAST, CT FACIAL BONES WITHOUT CONTRAST  LOCATION: [facility]  DATE/TIME: 12/15/2023 8:23 PM CST    INDICATION: fell, hit head, LOC  COMPARISON: None.  TECHNIQUE:   1) Routine CT Head without IV contrast. Multiplanar reformats. Dose reduction techniques were used.  2) Routine CT Facial Bones without IV contrast. Multiplanar reformats. Dose reduction techniques were used.  3) Routine CT Cervical Spine without IV contrast. Multiplanar reformats. Dose reduction techniques were used.    FINDINGS:  HEAD CT:   INTRACRANIAL CONTENTS: No intracranial hemorrhage, extraaxial collection, or mass effect.  No CT evidence of acute infarct. Mild presumed chronic small vessel ischemic changes. Mild to moderate generalized volume loss. No hydrocephalus.     BONES/SOFT TISSUES: No acute abnormality.    FACIAL BONE CT:   OSSEOUS STRUCTURES/SOFT TISSUES:   Frontal and nasal soft tissue swelling/inflammation. Mildly displaced and impacted nasal bone fractures with involvement of the osseous nasal septum. Bilateral mildly displaced [place] I fractures.  Bilateral symmetric anterior mandibular subluxation.    ORBITAL CONTENTS: No intraorbital abnormality.     SINUSES:  Scattered paranasal sinus mucosal thickening as well as hemorrhage.     CERVICAL SPINE CT:  VERTEBRA: Normal vertebral body heights and alignment. No acute compression fracture or posttraumatic subluxation.     CANAL/FORAMINA: Multilevel spondylosis without high grade canal stenosis.    PARASPINAL: No prevertebral edema. Incidental superficial lipoma in the posterior paraspinal soft tissues.      Impression    IMPRESSION:    HEAD CT:  1.  No acute intracranial process.    FACIAL BONE CT:  1.  Bilateral mildly displaced LeFort type I fractures.  2.  Mildly displaced and impacted nasal bone fractures with involvement of the osseous nasal septum. Overlying soft tissue swelling.  3.  Bilateral symmetric anterior subluxation of the mandibular condyle with respect to the mandibular fossa. This could be physiologic or posttraumatic. Correlate with physical examination.  4.  Posttraumatic intrasinus hemorrhage.    CERVICAL SPINE CT:  1.  No acute cervical spine fracture.   CT Cervical Spine w/o Contrast    Narrative    EXAM: CT HEAD W/O CONTRAST, CT CERVICAL SPINE W/O CONTRAST, CT FACIAL BONES WITHOUT CONTRAST  LOCATION: [facility]  DATE/TIME: 12/15/2023 8:23 PM CST    INDICATION: fell, hit head, LOC  COMPARISON: None.  TECHNIQUE:   1) Routine CT Head without IV contrast. Multiplanar reformats. Dose reduction techniques were used.  2) Routine CT Facial Bones without IV contrast. Multiplanar reformats. Dose reduction techniques were used.  3) Routine CT Cervical Spine without IV contrast. Multiplanar reformats. Dose reduction techniques were used.    FINDINGS:  HEAD CT:   INTRACRANIAL  CONTENTS: No intracranial hemorrhage, extraaxial collection, or mass effect.  No CT evidence of acute infarct. Mild presumed chronic small vessel ischemic changes. Mild to moderate generalized volume loss. No hydrocephalus.     BONES/SOFT TISSUES: No acute abnormality.    FACIAL BONE CT:   OSSEOUS STRUCTURES/SOFT TISSUES:  Frontal and nasal soft tissue swelling/inflammation. Mildly displaced and impacted nasal bone fractures with involvement of the osseous nasal septum. Bilateral mildly displaced [place] I fractures.  Bilateral symmetric anterior mandibular subluxation.    ORBITAL CONTENTS: No intraorbital abnormality.     SINUSES:  Scattered paranasal sinus mucosal thickening as well as hemorrhage.     CERVICAL SPINE CT:  VERTEBRA: Normal vertebral body heights and alignment. No acute compression fracture or posttraumatic subluxation.     CANAL/FORAMINA: Multilevel spondylosis without high grade canal stenosis.    PARASPINAL: No prevertebral edema. Incidental superficial lipoma in the posterior paraspinal soft tissues.      Impression    IMPRESSION:    HEAD CT:  1.  No acute intracranial process.    FACIAL BONE CT:  1.  Bilateral mildly displaced LeFort type I fractures.  2.  Mildly displaced and impacted nasal bone fractures with involvement of the osseous nasal septum. Overlying soft tissue swelling.  3.  Bilateral symmetric anterior subluxation of the mandibular condyle with respect to the mandibular fossa. This could be physiologic or posttraumatic. Correlate with physical examination.  4.  Posttraumatic intrasinus hemorrhage.    CERVICAL SPINE CT:  1.  No acute cervical spine fracture.   XR Chest Port 1 View    Narrative    EXAM: XR CHEST PORT 1 VIEW  LOCATION: [facility]  DATE: 12/15/2023    INDICATION: Cough and shortness of breath.  COMPARISON: None available.      Impression    IMPRESSION: Mild left basilar atelectasis and possible small left pleural effusion. No  pneumothorax.    Upper limits of normal heart size. Atherosclerosis of the thoracic aorta.   CT Chest Pulmonary Embolism w Contrast   Result Value    Radiologist flags New diagnosis of pulmonary embolism (AA)    Narrative    EXAM: CT CHEST PULMONARY EMBOLISM W CONTRAST  LOCATION: [facility]  DATE: 12/16/2023    INDICATION: tachycardic, Trop elevated, syncope. r o PE  COMPARISON: 12/15/2023  TECHNIQUE: CT chest pulmonary angiogram during arterial phase injection of IV contrast. Multiplanar reformats and MIP reconstructions were performed. Dose reduction techniques were used.   CONTRAST: 82  ml Isovue 370    FINDINGS:  ANGIOGRAM CHEST: There are small pulmonary emboli in the right middle lobe medial segment (images 165 - 177 of series 3). No central or saddle embolism. Thoracic aorta is negative for dissection. No CT evidence of right heart strain.    LUNGS AND PLEURA: Dependent subsegmental atelectasis in both lungs. Calcified right basilar granuloma. No pleural effusion.    MEDIASTINUM/AXILLAE: Normal heart size. No pericardial effusion.    CORONARY ARTERY CALCIFICATION: Mild.    UPPER ABDOMEN: Calcified splenic granulomas.    MUSCULOSKELETAL: Normal.      Impression    IMPRESSION:  1.  Examination positive for segmental and subsegmental right middle lobe pulmonary embolism. No central embolism.      [Critical Result: New diagnosis of pulmonary embolism]    Finding was identified on 12/16/2023 5:06 AM CST.     1.  Alley, the patient's nurse, was contacted by me on 12/16/2023 5:26 AM CST and verbalized understanding of the critical result.        Echo:  No results found for this or any previous visit (from the past 4320 hour(s)).    Clinically Significant Risk Factors Present on Admission          # Hypocalcemia: Lowest Ca = 8.2 mg/dL in last 2 days, will monitor and replace as appropriate       # Drug Induced Platelet Defect: home medication list includes an antiplatelet medication      "  # DMII: A1C = 7.9 % (Ref range: <5.7 %) within past 6 months    # Obesity: Estimated body mass index is 34.36 kg/m  as calculated from the following:    Height as of this encounter: 1.905 m (6' 3\").    Weight as of this encounter: 124.7 kg (274 lb 14.6 oz).         "

## 2023-12-16 NOTE — PROVIDER NOTIFICATION
MD Notification    Notified Person: MD    Notified Person Name: Keaton    Notification Date/Time: 12/16/2023, 0230    Notification Interaction: vocera    Purpose of Notification: Patient showed new a-fib for 1 min on tele monitor. Also CT called saying that there needs to be a new order for the CT     Orders Received:    Comments:

## 2023-12-16 NOTE — UTILIZATION REVIEW
[facility] Utilization Review  Admission Status; Secondary Review Determination     Admission Date: 12/15/2023  5:22 PM      Under the authority of the Utilization Management Committee, the utilization review process indicated a secondary review on the above patient.  The review outcome is based on review of the medical records, discussions with staff, and applying clinical experience noted on the date of the review.        (X)      Inpatient Status Appropriate - This patient's medical care is consistent with medical management for inpatient care and reasonable inpatient medical practice.          RATIONALE FOR DETERMINATION   Shola Lemus is a 78 year old male who presented after a syncopal episode and fall, hospitalized with acute segmental and subsegmental pulmonary embolism, syncope, paroxysmal atrial fibrillation, COVID-19 infection and multiple injuries including facial fractures.  Mildly elevated troponins.  Started on systemic anticoagulation, supportive cares, rate control for A-fib, pain control and further workup including transthoracic echo.  In light of complex clinical picture including multiple injuries, new thromboembolic event with need for ongoing management, anticoagulation and anticipated length of stay more than 2 midnights, criteria for inpatient admission is met.  Recommendation is communicated to the primary team (Dr. Mayberry).    The definitions of Inpatient Status and Observation Status used in making the determination above are those provided in the CMS Coverage Manual, Chapter 1 and Chapter 6, section 70.4.      Sincerely,       Jerry Carroll MD, MS  Physician Advisor  Utilization Review-[place]    Phone: [phone]

## 2023-12-16 NOTE — H&P
Assessment/Plan    78M hx of T2D, HTN HLD presenting with syncopal episode following severe coughing. Found to have facial bone fx along with covid.     Segmental and subsegmental right middle lobe pulmonary embolism   Syncope (situational given coughing +/-  PE related)  Afib  ``Hx: Patient presents following a syncopal episode that resulted in a fall onto his face. episode occurred following a bout of heavy coughing, which caused the patient to become lightheaded before syncopysing. Cough has persisted for 3 weeks associated with cold symptoms. Also endorses vomiting prior to the synoncap episode Found by his wife immediately afterwards. Patient had a presyncopal episode in the ER when they were attempted to assess if the patient was ready for discharge. Patient had urinary incontinence following the presyncopal episode upon standing.  *Of note, telemetry revealed a 1 min episode of Afib roughly at 245AM which I witnessed on tele myself  ``Vitals/Exam/EKG: , T 100.9. Exam shows Lip abrasians, superior nasal abrasian, nasal deviation. AOx4  ``Labs: Trop 58>54  ``Imaging: CTH/Spine wnl. CT chest showed segmental and subsegmental right middle lobe pulmonary embolism   ``ER Course: tylenol, oxy, 1L fluids  --cardiac telemetry  --repeat EKG in AM  --PT OT  --Cardio consult  --lovenox 120mg BID (note, given that PE occurred while patient has COVID, it maybe considered as provoked and as such could be discontinued in 3 months)  --IVF  --orthostatsis  --TTE    COVID Infection  Persistant cough  ``not requiring oxygen, has had productive cough for 3 weeks.  ``T 100.9  ``CXR showing: Mild left basilar atelectasis and possible small left pleural effusion   --mucinex  --follow-up AM ddimer for DVT ppx dosing consideration, for now SCDs  --Paxlovod (even though patient has had coughing for 3 weeks, officially diagnosed today with progressive coughs)    LeFort I fracture and nasal bone fractures   ``Imaging shows:  Bilateral mildly displaced LeFort type I fractures.Mildly displaced and impacted nasal bone fractures with involvement of the osseous nasal septum. Overlying soft tissue swelling. Bilateral symmetric anterior subluxation of the mandibular condyle with respect to the mandibular fossa. Posttraumatic intrasinus hemorrhage.  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 episode occurred following a bout of heavy coughing, which caused the patient to become lightheaded before syncopysing. Cough has persisted for 3 weeks associated with cold symptoms. Also endorses vomiting prior to the synoncap episode Found by his wife immediately afterwards. Patient had a presyncopal episode in the ER when they were attempted to assess if the patient was ready for discharge. Patient had urinary incontinence following the presyncopal episode upon standing.     ROS: 10 point ROS neg other than the symptoms noted  "above in the HPI.     Physical Exam  /69   Pulse 101   Temp (!) 100.9  F (38.3  C) (Oral)   Resp 16   Ht 1.905 m (6' 3\")   Wt 124.7 kg (275 lb)   SpO2 94%   BMI 34.37 kg/m      Constitutional: Alert and oriented to person, place and time; no apparent distress.   HEENT: Lip abrasians, superior nasal abrasian, nasal deviation  Abdomen: soft, no distention/tenderness/guarding  Lungs: lungs clear to auscultation bilaterally  Heart: Regular s1s2, no evidence of murmurs  Extremities/Neuro:No focal strength/sensation deficits, no LE edema  Skin: Lip abrasians, superior nasal abrasian  Psychiatric: appropriate affect, insight and judgment      I have personally spent 82 minutes total time today in preparing to see the patient (eg, review of tests), obtaining and/or reviewing separately obtained history, performing a medically appropriate examination and/or evaluation, counseling and educating the patient/family/caregiver, ordering medications, tests, or procedures, referring and communicating with other health care professionals, documenting clinical information in the electronic or other health record, independently interpreting results and communicating results to the patient/ family/caregiver and care coordination.    EMR History    Past Medical Hx:   Past Medical History:   Diagnosis Date    Concussion 20 years ago    Diabetes mellitus (H)     Hypercholesteremia     Hypertension         Home Medications: Present in Med Rec    Allergies: No Known Allergies     Pertinent Family Hx: No family history on file.     Surgical Hx:   Past Surgical History:   Procedure Laterality Date    HEAD & NECK SURGERY      tonsillectomy    HERNIA REPAIR      bilat        Substance Hx:   History   Drug Use No   ,  Social History    Substance and Sexual Activity      Alcohol use: Yes        Comment: one drink every 2 weeks  ,   History   Smoking Status    Never   Smokeless Tobacco    Never   ,   History   Sexual Activity    " Sexual activity: Not on file              Imaging/Labs    Imaging: XR Chest Port 1 View    Result Date: 12/15/2023  EXAM: XR CHEST PORT 1 VIEW LOCATION: [facility] DATE: 12/15/2023 INDICATION: Cough and shortness of breath. COMPARISON: None available.     IMPRESSION: Mild left basilar atelectasis and possible small left pleural effusion. No pneumothorax. Upper limits of normal heart size. Atherosclerosis of the thoracic aorta.    CT Facial Bones without Contrast    Result Date: 12/15/2023  EXAM: CT HEAD W/O CONTRAST, CT CERVICAL SPINE W/O CONTRAST, CT FACIAL BONES WITHOUT CONTRAST LOCATION: [facility] DATE/TIME: 12/15/2023 8:23 PM CST INDICATION: fell, hit head, LOC COMPARISON: None. TECHNIQUE: 1) Routine CT Head without IV contrast. Multiplanar reformats. Dose reduction techniques were used. 2) Routine CT Facial Bones without IV contrast. Multiplanar reformats. Dose reduction techniques were used. 3) Routine CT Cervical Spine without IV contrast. Multiplanar reformats. Dose reduction techniques were used. FINDINGS: HEAD CT: INTRACRANIAL CONTENTS: No intracranial hemorrhage, extraaxial collection, or mass effect.  No CT evidence of acute infarct. Mild presumed chronic small vessel ischemic changes. Mild to moderate generalized volume loss. No hydrocephalus. BONES/SOFT TISSUES: No acute abnormality. FACIAL BONE CT: OSSEOUS STRUCTURES/SOFT TISSUES:  Frontal and nasal soft tissue swelling/inflammation. Mildly displaced and impacted nasal bone fractures with involvement of the osseous nasal septum. Bilateral mildly displaced Le Fort I fractures. Bilateral symmetric anterior mandibular subluxation. ORBITAL CONTENTS: No intraorbital abnormality. SINUSES:  Scattered paranasal sinus mucosal thickening as well as hemorrhage. CERVICAL SPINE CT: VERTEBRA: Normal vertebral body heights and alignment. No acute compression fracture or posttraumatic subluxation. CANAL/FORAMINA:  Multilevel spondylosis without high grade canal stenosis. PARASPINAL: No prevertebral edema. Incidental superficial lipoma in the posterior paraspinal soft tissues.     IMPRESSION: HEAD CT: 1.  No acute intracranial process. FACIAL BONE CT: 1.  Bilateral mildly displaced LeFort type I fractures. 2.  Mildly displaced and impacted nasal bone fractures with involvement of the osseous nasal septum. Overlying soft tissue swelling. 3.  Bilateral symmetric anterior subluxation of the mandibular condyle with respect to the mandibular fossa. This could be physiologic or posttraumatic. Correlate with physical examination. 4.  Posttraumatic intrasinus hemorrhage. CERVICAL SPINE CT: 1.  No acute cervical spine fracture.    CT Cervical Spine w/o Contrast    Result Date: 12/15/2023  EXAM: CT HEAD W/O CONTRAST, CT CERVICAL SPINE W/O CONTRAST, CT FACIAL BONES WITHOUT CONTRAST LOCATION: [facility] DATE/TIME: 12/15/2023 8:23 PM CST INDICATION: fell, hit head, LOC COMPARISON: None. TECHNIQUE: 1) Routine CT Head without IV contrast. Multiplanar reformats. Dose reduction techniques were used. 2) Routine CT Facial Bones without IV contrast. Multiplanar reformats. Dose reduction techniques were used. 3) Routine CT Cervical Spine without IV contrast. Multiplanar reformats. Dose reduction techniques were used. FINDINGS: HEAD CT: INTRACRANIAL CONTENTS: No intracranial hemorrhage, extraaxial collection, or mass effect.  No CT evidence of acute infarct. Mild presumed chronic small vessel ischemic changes. Mild to moderate generalized volume loss. No hydrocephalus. BONES/SOFT TISSUES: No acute abnormality. FACIAL BONE CT: OSSEOUS STRUCTURES/SOFT TISSUES:  Frontal and nasal soft tissue swelling/inflammation. Mildly displaced and impacted nasal bone fractures with involvement of the osseous nasal septum. Bilateral mildly displaced Le Fort I fractures. Bilateral symmetric anterior mandibular subluxation. ORBITAL CONTENTS:  No intraorbital abnormality. SINUSES:  Scattered paranasal sinus mucosal thickening as well as hemorrhage. CERVICAL SPINE CT: VERTEBRA: Normal vertebral body heights and alignment. No acute compression fracture or posttraumatic subluxation. CANAL/FORAMINA: Multilevel spondylosis without high grade canal stenosis. PARASPINAL: No prevertebral edema. Incidental superficial lipoma in the posterior paraspinal soft tissues.     IMPRESSION: HEAD CT: 1.  No acute intracranial process. FACIAL BONE CT: 1.  Bilateral mildly displaced LeFort type I fractures. 2.  Mildly displaced and impacted nasal bone fractures with involvement of the osseous nasal septum. Overlying soft tissue swelling. 3.  Bilateral symmetric anterior subluxation of the mandibular condyle with respect to the mandibular fossa. This could be physiologic or posttraumatic. Correlate with physical examination. 4.  Posttraumatic intrasinus hemorrhage. CERVICAL SPINE CT: 1.  No acute cervical spine fracture.    Head CT w/o contrast    Result Date: 12/15/2023  EXAM: CT HEAD W/O CONTRAST, CT CERVICAL SPINE W/O CONTRAST, CT FACIAL BONES WITHOUT CONTRAST LOCATION: [facility] DATE/TIME: 12/15/2023 8:23 PM CST INDICATION: fell, hit head, LOC COMPARISON: None. TECHNIQUE: 1) Routine CT Head without IV contrast. Multiplanar reformats. Dose reduction techniques were used. 2) Routine CT Facial Bones without IV contrast. Multiplanar reformats. Dose reduction techniques were used. 3) Routine CT Cervical Spine without IV contrast. Multiplanar reformats. Dose reduction techniques were used. FINDINGS: HEAD CT: INTRACRANIAL CONTENTS: No intracranial hemorrhage, extraaxial collection, or mass effect.  No CT evidence of acute infarct. Mild presumed chronic small vessel ischemic changes. Mild to moderate generalized volume loss. No hydrocephalus. BONES/SOFT TISSUES: No acute abnormality. FACIAL BONE CT: OSSEOUS STRUCTURES/SOFT TISSUES:  Frontal and nasal  "soft tissue swelling/inflammation. Mildly displaced and impacted nasal bone fractures with involvement of the osseous nasal septum. Bilateral mildly displaced Le Fort I fractures. Bilateral symmetric anterior mandibular subluxation. ORBITAL CONTENTS: No intraorbital abnormality. SINUSES:  Scattered paranasal sinus mucosal thickening as well as hemorrhage. CERVICAL SPINE CT: VERTEBRA: Normal vertebral body heights and alignment. No acute compression fracture or posttraumatic subluxation. CANAL/FORAMINA: Multilevel spondylosis without high grade canal stenosis. PARASPINAL: No prevertebral edema. Incidental superficial lipoma in the posterior paraspinal soft tissues.     IMPRESSION: HEAD CT: 1.  No acute intracranial process. FACIAL BONE CT: 1.  Bilateral mildly displaced LeFort type I fractures. 2.  Mildly displaced and impacted nasal bone fractures with involvement of the osseous nasal septum. Overlying soft tissue swelling. 3.  Bilateral symmetric anterior subluxation of the mandibular condyle with respect to the mandibular fossa. This could be physiologic or posttraumatic. Correlate with physical examination. 4.  Posttraumatic intrasinus hemorrhage. CERVICAL SPINE CT: 1.  No acute cervical spine fracture.         Recent Labs   Lab Test 12/15/23  2334 12/15/23  1734 06/04/22  0113   NA  --  131* 139   POTASSIUM  --  4.0 3.7   CHLORIDE  --  96*  --    CO2  --  25  --    ANIONGAP  --  10  --    * 135*  --    BUN  --  11.7  --    CR  --  0.95  --    RHONDA  --  8.5*  --      CBC RESULTS:   Recent Labs   Lab Test 12/15/23  1734   WBC 10.1   RBC 4.36*   HGB 13.0*   HCT 39.9*   MCV 92   MCH 29.8   MCHC 32.6   RDW 13.8         Liver Function Studies -   Recent Labs   Lab Test 12/15/23  1734   PROTTOTAL 7.5   ALBUMIN 3.8   BILITOTAL 0.8   ALKPHOS 84   AST 31   ALT 27      No results found for: \"TROPI\"       "

## 2023-12-16 NOTE — ED PROVIDER NOTES
"  History     Chief Complaint:  Syncope and Fall       HPI   Shola Lemus is a 78 year old male with a history of diabetes, hypertension, hyperlipidemia who presents emergency department after a fall.  Patient reports that he was coughing really hard, felt lightheaded, fell forward and hit his face on the ground.  He is complaining of forehead pain, nose pain, and top lip pain.  Patient reports that he has had cough and cold symptoms for the past week.  Denies vomiting or diarrhea.  No chest pain or shortness of breath.      Independent Historian:   None - Patient Only    Review of External Notes:   Reviewed patient's office visit from 11/9/2023, patient has a history of hypertension, hyperlipidemia, type 2 diabetes      Medications:    ASPIRIN PO  ATORVASTATIN CALCIUM PO  GLIPIZIDE PO  insulin glargine (LANTUS) 100 UNIT/ML injection  METFORMIN HCL PO  METOPROLOL SUCCINATE PO  oxyCODONE-acetaminophen (PERCOCET) 5-325 MG per tablet        Past Medical History:    Past Medical History:   Diagnosis Date    Concussion 20 years ago    Diabetes mellitus (H)     Hypercholesteremia     Hypertension        Past Surgical History:    Past Surgical History:   Procedure Laterality Date    HEAD & NECK SURGERY      tonsillectomy    HERNIA REPAIR      bilat        Physical Exam   Patient Vitals for the past 24 hrs:   BP Temp Temp src Pulse Resp SpO2 Height Weight   12/15/23 1732 113/69 (!) 100.9  F (38.3  C) Oral 101 16 94 % 1.905 m (6' 3\") 124.7 kg (275 lb)        Physical Exam  General: The patient is alert, has no immediate need for airway protection and no signs of toxicity.  Eyes: Pupils equal and reactive. No pallor or injection.  ENT:  Moist mucus membranes.  0.5 cm laceration on the inside of the top lip.  No patel sign. No periorbital swelling or ecchymosis. No hemotympanum.  No septal hematoma.  Tenderness to palpation of the bridge of the nose.  Deviated septum towards the left, patient states this is baseline.  " Teeth feel stable.  No trismus, patient opens and closes his jaw without difficulty.  Head: Atraumatic. No hematoma, abrasion, or laceration  Neck: No midline tenderness to palpation.   Respiratory:  Lungs clear to auscultation bilaterally, no crackles/rubs/wheezes.  Good air movement.  CV: Normal rate and rhythm, no murmurs.  Abdomen: No tenderness, guarding or rebound.  Skin: Warm, dry.  No lesions or abrasion.  Musculoskeletal: Patient moves all extremities. Extremities are non-tender, non swollen, and have full range of motion.  No midline tenderness of the spine.    Neuro: Awake, alert. Follows commands.    Psychiatric: Normal affect      Emergency Department Course   ekg      Imaging:  XR Chest Port 1 View   Final Result   IMPRESSION: Mild left basilar atelectasis and possible small left pleural effusion. No pneumothorax.      Upper limits of normal heart size. Atherosclerosis of the thoracic aorta.      CT Cervical Spine w/o Contrast   Final Result   IMPRESSION:      HEAD CT:   1.  No acute intracranial process.      FACIAL BONE CT:   1.  Bilateral mildly displaced LeFort type I fractures.   2.  Mildly displaced and impacted nasal bone fractures with involvement of the osseous nasal septum. Overlying soft tissue swelling.   3.  Bilateral symmetric anterior subluxation of the mandibular condyle with respect to the mandibular fossa. This could be physiologic or posttraumatic. Correlate with physical examination.   4.  Posttraumatic intrasinus hemorrhage.      CERVICAL SPINE CT:   1.  No acute cervical spine fracture.      Head CT w/o contrast   Final Result   IMPRESSION:      HEAD CT:   1.  No acute intracranial process.      FACIAL BONE CT:   1.  Bilateral mildly displaced LeFort type I fractures.   2.  Mildly displaced and impacted nasal bone fractures with involvement of the osseous nasal septum. Overlying soft tissue swelling.   3.  Bilateral symmetric anterior subluxation of the mandibular condyle with  respect to the mandibular fossa. This could be physiologic or posttraumatic. Correlate with physical examination.   4.  Posttraumatic intrasinus hemorrhage.      CERVICAL SPINE CT:   1.  No acute cervical spine fracture.      CT Facial Bones without Contrast   Final Result   IMPRESSION:      HEAD CT:   1.  No acute intracranial process.      FACIAL BONE CT:   1.  Bilateral mildly displaced LeFort type I fractures.   2.  Mildly displaced and impacted nasal bone fractures with involvement of the osseous nasal septum. Overlying soft tissue swelling.   3.  Bilateral symmetric anterior subluxation of the mandibular condyle with respect to the mandibular fossa. This could be physiologic or posttraumatic. Correlate with physical examination.   4.  Posttraumatic intrasinus hemorrhage.      CERVICAL SPINE CT:   1.  No acute cervical spine fracture.             Laboratory:  Labs Ordered and Resulted from Time of ED Arrival to Time of ED Departure   COMPREHENSIVE METABOLIC PANEL - Abnormal       Result Value    Sodium 131 (*)     Potassium 4.0      Carbon Dioxide (CO2) 25      Anion Gap 10      Urea Nitrogen 11.7      Creatinine 0.95      GFR Estimate 83      Calcium 8.5 (*)     Chloride 96 (*)     Glucose 135 (*)     Alkaline Phosphatase 84      AST 31      ALT 27      Protein Total 7.5      Albumin 3.8      Bilirubin Total 0.8     INFLUENZA A/B, RSV, & SARS-COV2 PCR - Abnormal    Influenza A PCR Negative      Influenza B PCR Negative      RSV PCR Negative      SARS CoV2 PCR Positive (*)    ISTAT GASES LACTATE VENOUS POCT - Abnormal    Lactic Acid POCT 1.4      Bicarbonate Venous POCT 23      O2 Sat, Venous POCT 78 (*)     pCO2 Venous POCT 36 (*)     pH Venous POCT 7.41      pO2 Venous POCT 42     CBC WITH PLATELETS AND DIFFERENTIAL - Abnormal    WBC Count 10.1      RBC Count 4.36 (*)     Hemoglobin 13.0 (*)     Hematocrit 39.9 (*)     MCV 92      MCH 29.8      MCHC 32.6      RDW 13.8      Platelet Count 230      %  Neutrophils 74      % Lymphocytes 10      % Monocytes 14      % Eosinophils 1      % Basophils 0      % Immature Granulocytes 1      NRBCs per 100 WBC 0      Absolute Neutrophils 7.5      Absolute Lymphocytes 1.0      Absolute Monocytes 1.4 (*)     Absolute Eosinophils 0.1      Absolute Basophils 0.0      Absolute Immature Granulocytes 0.1      Absolute NRBCs 0.0     GLUCOSE BY METER - Abnormal    GLUCOSE BY METER POCT 171 (*)    TROPONIN T, HIGH SENSITIVITY        Procedures       Emergency Department Course & Assessments:             Interventions:  Medications   sodium chloride 0.9% BOLUS 1,000 mL (0 mLs Intravenous Stopped 12/15/23 2054)   acetaminophen (TYLENOL) tablet 1,000 mg (1,000 mg Oral $Given 12/15/23 2052)   oxyCODONE (ROXICODONE) tablet 5 mg (5 mg Oral $Given 12/15/23 2238)        Assessments:  Patient is feeling better, his mouth is still hurting.    Independent Interpretation (X-rays, CTs, rhythm strip):  Chest x-ray does not show any sign of consolidation.    Consultations/Discussion of Management or Tests:  [place]. [place] [facility] has a displaced LeFort type I fracture.  He will come evaluate the patient at bedside.       Social Determinants of Health affecting care:   None    Disposition:  The patient is admitted for observation to Dr. Pugh    Impression & Plan    CMS Diagnoses: None      Medical Decision Makin-year-old male presents emergency department after a syncopal episode and fall.  Patient reports that he was coughing really hard, he was dry heaving and then he felt very lightheaded.  He decided to try and walk to a bedroom, and did not make it.  He fell forward and hit his face on the ground.  Patient reports that he has had cough and cold symptoms for about a week.  Denies any vomiting or diarrhea.  Denies any chest pain or shortness of breath.  No postictal phase.  On exam patient has a 0.5 cm laceration on the inside of his top lip.  He has tenderness  to palpation of his frontal sinus and nose.  He does have a deviated septum towards the left, but patient reports this is chronic for him.  Patient is febrile, and appropriate tachycardia secondary to fever.  No signs of sepsis.  Patient is COVID-positive.  He does have a displaced LeFort type I fracture.  I spoke with Dr. Waters, he will come in and evaluate the patient.  Spoke with Dr. Waters, recommend soft diet for 6 weeks.  Nonsurgical.  No need for outpatient follow-up.  Patient will follow-up with his primary care physician.  Does have a small laceration on the inside of his top lip, no need for any repair.  Patient states he feels well enough to go home.  I did offer the patient pain medicine for home, as well as cough medicine or nausea medicine.  Patient refuses all of these.  He will return if he worsens.  His vital signs remained stable, he is not hypoxic and no longer tachycardic.     Patient tried to ambulate, he nearly syncopized.  Patient got diaphoretic, lightheaded.  Got a fingerstick blood sugar which is 171.  Repeated EKG. Added on troponin.  Spoke with Dr. Pugh for admission.    Trop came back elevated. Ordered repeat trop and CT PE scan. Asked nurse to call Dr. Pugh with second trop results if elevating. I let Dr. Pugh know that I added on a CT scan for concern for PE.    Diagnosis:    ICD-10-CM    1. COVID-19  U07.1       2. Fall, initial encounter  W19.XXXA       3. Closed Le Fort I fracture, initial encounter (H)  S02.411A       4. Closed fracture of nasal bone, initial encounter  S02.2XXA       5. Syncope and collapse  R55       6. Laceration of intraoral surface of lip, initial encounter  S01.511A            Discharge Medications:  New Prescriptions    No medications on file          Cristiana Guo MD  12/15/2023   Cristiana Guo MD Richardson, Elizabeth, MD  12/17/23 1700

## 2023-12-16 NOTE — ED NOTES
"[facility]  ED Nurse Handoff Report    ED Chief complaint: Syncope and Fall      ED Diagnosis:   Final diagnoses:   COVID-19   Fall, initial encounter   Closed [place] I fracture, initial encounter (H)   Closed fracture of nasal bone, initial encounter   Syncope and collapse   Laceration of intraoral surface of lip, initial encounter       Code Status: Provider to Order    Allergies: No Known Allergies    Patient Story: BIBA from home. Had syncopal event and fell to the floor, striking head on floor. LOC for \"a few seconds\". Not on thinners. Was evaluated and treated, ready for discharge when he had another syncopal episode and soiling himself in the process.   Focused Assessment:  Aox4. On room air, covid positive.    Treatments and/or interventions provided:   Medications   sodium chloride 0.9% BOLUS 1,000 mL (0 mLs Intravenous Stopped 12/15/23 2054)   acetaminophen (TYLENOL) tablet 1,000 mg (1,000 mg Oral $Given 12/15/23 2052)   oxyCODONE (ROXICODONE) tablet 5 mg (5 mg Oral $Given 12/15/23 2238)     Results for orders placed or performed during the hospital encounter of 12/15/23   CT Facial Bones without Contrast     Status: None    Narrative    EXAM: CT HEAD W/O CONTRAST, CT CERVICAL SPINE W/O CONTRAST, CT FACIAL BONES WITHOUT CONTRAST  LOCATION: [facility]  DATE/TIME: 12/15/2023 8:23 PM CST    INDICATION: fell, hit head, LOC  COMPARISON: None.  TECHNIQUE:   1) Routine CT Head without IV contrast. Multiplanar reformats. Dose reduction techniques were used.  2) Routine CT Facial Bones without IV contrast. Multiplanar reformats. Dose reduction techniques were used.  3) Routine CT Cervical Spine without IV contrast. Multiplanar reformats. Dose reduction techniques were used.    FINDINGS:  HEAD CT:   INTRACRANIAL CONTENTS: No intracranial hemorrhage, extraaxial collection, or mass effect.  No CT evidence of acute infarct. Mild presumed chronic small vessel ischemic changes. " Mild to moderate generalized volume loss. No hydrocephalus.     BONES/SOFT TISSUES: No acute abnormality.    FACIAL BONE CT:   OSSEOUS STRUCTURES/SOFT TISSUES:  Frontal and nasal soft tissue swelling/inflammation. Mildly displaced and impacted nasal bone fractures with involvement of the osseous nasal septum. Bilateral mildly displaced [place] I fractures.  Bilateral symmetric anterior mandibular subluxation.    ORBITAL CONTENTS: No intraorbital abnormality.     SINUSES:  Scattered paranasal sinus mucosal thickening as well as hemorrhage.     CERVICAL SPINE CT:  VERTEBRA: Normal vertebral body heights and alignment. No acute compression fracture or posttraumatic subluxation.     CANAL/FORAMINA: Multilevel spondylosis without high grade canal stenosis.    PARASPINAL: No prevertebral edema. Incidental superficial lipoma in the posterior paraspinal soft tissues.      Impression    IMPRESSION:    HEAD CT:  1.  No acute intracranial process.    FACIAL BONE CT:  1.  Bilateral mildly displaced LeFort type I fractures.  2.  Mildly displaced and impacted nasal bone fractures with involvement of the osseous nasal septum. Overlying soft tissue swelling.  3.  Bilateral symmetric anterior subluxation of the mandibular condyle with respect to the mandibular fossa. This could be physiologic or posttraumatic. Correlate with physical examination.  4.  Posttraumatic intrasinus hemorrhage.    CERVICAL SPINE CT:  1.  No acute cervical spine fracture.   CT Cervical Spine w/o Contrast     Status: None    Narrative    EXAM: CT HEAD W/O CONTRAST, CT CERVICAL SPINE W/O CONTRAST, CT FACIAL BONES WITHOUT CONTRAST  LOCATION: [facility]  DATE/TIME: 12/15/2023 8:23 PM CST    INDICATION: fell, hit head, LOC  COMPARISON: None.  TECHNIQUE:   1) Routine CT Head without IV contrast. Multiplanar reformats. Dose reduction techniques were used.  2) Routine CT Facial Bones without IV contrast. Multiplanar reformats. Dose  reduction techniques were used.  3) Routine CT Cervical Spine without IV contrast. Multiplanar reformats. Dose reduction techniques were used.    FINDINGS:  HEAD CT:   INTRACRANIAL CONTENTS: No intracranial hemorrhage, extraaxial collection, or mass effect.  No CT evidence of acute infarct. Mild presumed chronic small vessel ischemic changes. Mild to moderate generalized volume loss. No hydrocephalus.     BONES/SOFT TISSUES: No acute abnormality.    FACIAL BONE CT:   OSSEOUS STRUCTURES/SOFT TISSUES:  Frontal and nasal soft tissue swelling/inflammation. Mildly displaced and impacted nasal bone fractures with involvement of the osseous nasal septum. Bilateral mildly displaced [place] I fractures.  Bilateral symmetric anterior mandibular subluxation.    ORBITAL CONTENTS: No intraorbital abnormality.     SINUSES:  Scattered paranasal sinus mucosal thickening as well as hemorrhage.     CERVICAL SPINE CT:  VERTEBRA: Normal vertebral body heights and alignment. No acute compression fracture or posttraumatic subluxation.     CANAL/FORAMINA: Multilevel spondylosis without high grade canal stenosis.    PARASPINAL: No prevertebral edema. Incidental superficial lipoma in the posterior paraspinal soft tissues.      Impression    IMPRESSION:    HEAD CT:  1.  No acute intracranial process.    FACIAL BONE CT:  1.  Bilateral mildly displaced LeFort type I fractures.  2.  Mildly displaced and impacted nasal bone fractures with involvement of the osseous nasal septum. Overlying soft tissue swelling.  3.  Bilateral symmetric anterior subluxation of the mandibular condyle with respect to the mandibular fossa. This could be physiologic or posttraumatic. Correlate with physical examination.  4.  Posttraumatic intrasinus hemorrhage.    CERVICAL SPINE CT:  1.  No acute cervical spine fracture.   Head CT w/o contrast     Status: None    Narrative    EXAM: CT HEAD W/O CONTRAST, CT CERVICAL SPINE W/O CONTRAST, CT FACIAL BONES WITHOUT  CONTRAST  LOCATION: [facility]  DATE/TIME: 12/15/2023 8:23 PM CST    INDICATION: fell, hit head, LOC  COMPARISON: None.  TECHNIQUE:   1) Routine CT Head without IV contrast. Multiplanar reformats. Dose reduction techniques were used.  2) Routine CT Facial Bones without IV contrast. Multiplanar reformats. Dose reduction techniques were used.  3) Routine CT Cervical Spine without IV contrast. Multiplanar reformats. Dose reduction techniques were used.    FINDINGS:  HEAD CT:   INTRACRANIAL CONTENTS: No intracranial hemorrhage, extraaxial collection, or mass effect.  No CT evidence of acute infarct. Mild presumed chronic small vessel ischemic changes. Mild to moderate generalized volume loss. No hydrocephalus.     BONES/SOFT TISSUES: No acute abnormality.    FACIAL BONE CT:   OSSEOUS STRUCTURES/SOFT TISSUES:  Frontal and nasal soft tissue swelling/inflammation. Mildly displaced and impacted nasal bone fractures with involvement of the osseous nasal septum. Bilateral mildly displaced [place] I fractures.  Bilateral symmetric anterior mandibular subluxation.    ORBITAL CONTENTS: No intraorbital abnormality.     SINUSES:  Scattered paranasal sinus mucosal thickening as well as hemorrhage.     CERVICAL SPINE CT:  VERTEBRA: Normal vertebral body heights and alignment. No acute compression fracture or posttraumatic subluxation.     CANAL/FORAMINA: Multilevel spondylosis without high grade canal stenosis.    PARASPINAL: No prevertebral edema. Incidental superficial lipoma in the posterior paraspinal soft tissues.      Impression    IMPRESSION:    HEAD CT:  1.  No acute intracranial process.    FACIAL BONE CT:  1.  Bilateral mildly displaced LeFort type I fractures.  2.  Mildly displaced and impacted nasal bone fractures with involvement of the osseous nasal septum. Overlying soft tissue swelling.  3.  Bilateral symmetric anterior subluxation of the mandibular condyle with respect to the mandibular  fossa. This could be physiologic or posttraumatic. Correlate with physical examination.  4.  Posttraumatic intrasinus hemorrhage.    CERVICAL SPINE CT:  1.  No acute cervical spine fracture.   XR Chest Port 1 View     Status: None    Narrative    EXAM: XR CHEST PORT 1 VIEW  LOCATION: [facility]  DATE: 12/15/2023    INDICATION: Cough and shortness of breath.  COMPARISON: None available.      Impression    IMPRESSION: Mild left basilar atelectasis and possible small left pleural effusion. No pneumothorax.    Upper limits of normal heart size. Atherosclerosis of the thoracic aorta.   Comprehensive metabolic panel     Status: Abnormal   Result Value Ref Range    Sodium 131 (L) 135 - 145 mmol/L    Potassium 4.0 3.4 - 5.3 mmol/L    Carbon Dioxide (CO2) 25 22 - 29 mmol/L    Anion Gap 10 7 - 15 mmol/L    Urea Nitrogen 11.7 8.0 - 23.0 mg/dL    Creatinine 0.95 0.67 - 1.17 mg/dL    GFR Estimate 83 >60 mL/min/1.73m2    Calcium 8.5 (L) 8.8 - 10.2 mg/dL    Chloride 96 (L) 98 - 107 mmol/L    Glucose 135 (H) 70 - 99 mg/dL    Alkaline Phosphatase 84 40 - 150 U/L    AST 31 0 - 45 U/L    ALT 27 0 - 70 U/L    Protein Total 7.5 6.4 - 8.3 g/dL    Albumin 3.8 3.5 - 5.2 g/dL    Bilirubin Total 0.8 <=1.2 mg/dL   [place] Draw     Status: None    Narrative    The following orders were created for panel order [place] Draw.  Procedure                               Abnormality         Status                     ---------                               -----------         ------                     Extra Blood Culture Bottle[[id number]]                       Final result               Extra Blue Top Tube[[id number]]                              Final result                 Please view results for these tests on the individual orders.   Symptomatic Influenza A/B, RSV, & SARS-CoV2 PCR (COVID-19) Nasopharyngeal     Status: Abnormal    Specimen: Nasopharyngeal; Swab   Result Value Ref Range    Influenza A PCR Negative Negative     Influenza B PCR Negative Negative    RSV PCR Negative Negative    SARS CoV2 PCR Positive (A) Negative    Narrative    Testing was performed using the Xpert Xpress CoV2/Flu/RSV Assay on the [employer] GeneXpert Instrument. This test should be ordered for the detection of SARS-CoV-2, influenza, and RSV viruses in individuals who meet clinical and/or epidemiological criteria. Test performance is unknown in asymptomatic patients. This test is for in vitro diagnostic use under the FDA EUA for laboratories certified under CLIA to perform high or moderate complexity testing. This test has not been FDA cleared or approved. A negative result does not rule out the presence of PCR inhibitors in the specimen or target RNA in concentration below the limit of detection for the assay. If only one viral target is positive but coinfection with multiple targets is suspected, the sample should be re-tested with another FDA cleared, approved, or authorized test, if coinfection would change clinical management. This test was validated by the [facility]. These laboratories are certified under the Clinical Laboratory Improvement Amendments of 1988 (CLIA-88) as qualified to perform high complexity laboratory testing.   iStat Gases (lactate) venous, POCT     Status: Abnormal   Result Value Ref Range    Lactic Acid POCT 1.4 <=2.0 mmol/L    Bicarbonate Venous POCT 23 21 - 28 mmol/L    O2 Sat, Venous POCT 78 (L) 94 - 100 %    pCO2 Venous POCT 36 (L) 40 - 50 mm Hg    pH Venous POCT 7.41 7.32 - 7.43    pO2 Venous POCT 42 25 - 47 mm Hg   CBC with platelets and differential     Status: Abnormal   Result Value Ref Range    WBC Count 10.1 4.0 - 11.0 10e3/uL    RBC Count 4.36 (L) 4.40 - 5.90 10e6/uL    Hemoglobin 13.0 (L) 13.3 - 17.7 g/dL    Hematocrit 39.9 (L) 40.0 - 53.0 %    MCV 92 78 - 100 fL    MCH 29.8 26.5 - 33.0 pg    MCHC 32.6 31.5 - 36.5 g/dL    RDW 13.8 10.0 - 15.0 %    Platelet Count 230 150 - 450 10e3/uL    %  Neutrophils 74 %    % Lymphocytes 10 %    % Monocytes 14 %    % Eosinophils 1 %    % Basophils 0 %    % Immature Granulocytes 1 %    NRBCs per 100 WBC 0 <1 /100    Absolute Neutrophils 7.5 1.6 - 8.3 10e3/uL    Absolute Lymphocytes 1.0 0.8 - 5.3 10e3/uL    Absolute Monocytes 1.4 (H) 0.0 - 1.3 10e3/uL    Absolute Eosinophils 0.1 0.0 - 0.7 10e3/uL    Absolute Basophils 0.0 0.0 - 0.2 10e3/uL    Absolute Immature Granulocytes 0.1 <=0.4 10e3/uL    Absolute NRBCs 0.0 10e3/uL   Extra Blood Culture Bottle     Status: None   Result Value Ref Range    Hold Specimen JIC    Extra Blue Top Tube     Status: None   Result Value Ref Range    Hold Specimen JIC    Glucose by meter     Status: Abnormal   Result Value Ref Range    GLUCOSE BY METER POCT 171 (H) 70 - 99 mg/dL   EKG 12-lead, tracing only     Status: None   Result Value Ref Range    Systolic Blood Pressure  mmHg    Diastolic Blood Pressure  mmHg    Ventricular Rate 106 BPM    Atrial Rate 106 BPM    AZ Interval 218 ms    QRS Duration 120 ms     ms    QTc 451 ms    P Axis 60 degrees    R AXIS -71 degrees    T Axis 79 degrees    Interpretation ECG       Sinus tachycardia with 1st degree A-V block  Right bundle branch block  Left anterior fascicular block  ** Bifascicular block **  Abnormal ECG  When compared with ECG of 26-SEP-2012 14:03,  AZ interval has increased  Vent. rate has increased BY  36 BPM  Right bundle branch block is now Present  Confirmed by GENERATED REPORT, COMPUTER ([id number]),  Aasen, Bradley ([id number]) on 12/15/2023 7:24:14 PM     CBC with platelets + differential     Status: Abnormal    Narrative    The following orders were created for panel order CBC with platelets + differential.  Procedure                               Abnormality         Status                     ---------                               -----------         ------                     CBC with platelets and d...[[id number]]  Abnormal            Final result                 Please view  "results for these tests on the individual orders.       Patient's response to treatments and/or interventions:     To be done/followed up on inpatient unit:  Per provider orders    Does this patient have any cognitive concerns?:  None    Activity level - Baseline/Home:  Independent  Activity Level - Current:   Stand with Assist    Patient's Preferred language: English   Needed?: No    Isolation: Contact  and Droplet  Infection: COVID r/o and special precautions  Patient tested for COVID 19 prior to admission: YES  Bariatric?: No    Vital Signs:   Vitals:    12/15/23 1732   BP: 113/69   Pulse: 101   Resp: 16   Temp: (!) 100.9  F (38.3  C)   TempSrc: Oral   SpO2: 94%   Weight: 124.7 kg (275 lb)   Height: 1.905 m (6' 3\")       Cardiac Rhythm:Cardiac Rhythm: Heart block;Sinus tachycardia    Was the PSS-3 completed:   Yes  What interventions are required if any?               Family Comments: None  OBS brochure/video discussed/provided to patient/family: No              Name of person given brochure if not patient:               Relationship to patient:     For the majority of the shift this patient's behavior was Green.   Behavioral interventions performed were None.    ED NURSE PHONE NUMBER: *[phone]         "

## 2023-12-16 NOTE — ED NOTES
Attempted to get patient in wheel chair for discharge. Upon sitting up, patient began feeling like he was going to pass out and became diaphoretic. Assisted patient in laying back down on cot. Patient was incontinent of urine during this without knowing he was. MD was notified.

## 2023-12-17 ENCOUNTER — APPOINTMENT (OUTPATIENT)
Dept: PHYSICAL THERAPY | Facility: CLINIC | Age: 78
DRG: 177 | End: 2023-12-17
Attending: STUDENT IN AN ORGANIZED HEALTH CARE EDUCATION/TRAINING PROGRAM
Payer: MEDICARE

## 2023-12-17 ENCOUNTER — APPOINTMENT (OUTPATIENT)
Dept: CARDIOLOGY | Facility: CLINIC | Age: 78
DRG: 177 | End: 2023-12-17
Attending: INTERNAL MEDICINE
Payer: MEDICARE

## 2023-12-17 VITALS
WEIGHT: 274.91 LBS | TEMPERATURE: 98.1 F | SYSTOLIC BLOOD PRESSURE: 138 MMHG | DIASTOLIC BLOOD PRESSURE: 78 MMHG | RESPIRATION RATE: 16 BRPM | HEIGHT: 75 IN | BODY MASS INDEX: 34.18 KG/M2 | OXYGEN SATURATION: 96 % | HEART RATE: 62 BPM

## 2023-12-17 LAB
CREAT SERPL-MCNC: 0.86 MG/DL (ref 0.67–1.17)
EGFRCR SERPLBLD CKD-EPI 2021: 90 ML/MIN/1.73M2
GLUCOSE BLDC GLUCOMTR-MCNC: 116 MG/DL (ref 70–99)
GLUCOSE BLDC GLUCOMTR-MCNC: 135 MG/DL (ref 70–99)
GLUCOSE BLDC GLUCOMTR-MCNC: 199 MG/DL (ref 70–99)
GLUCOSE BLDC GLUCOMTR-MCNC: 357 MG/DL (ref 70–99)
HGB BLD-MCNC: 12.1 G/DL (ref 13.3–17.7)

## 2023-12-17 PROCEDURE — 93270 REMOTE 30 DAY ECG REV/REPORT: CPT

## 2023-12-17 PROCEDURE — 97535 SELF CARE MNGMENT TRAINING: CPT | Mod: GP

## 2023-12-17 PROCEDURE — 99233 SBSQ HOSP IP/OBS HIGH 50: CPT | Mod: 25 | Performed by: INTERNAL MEDICINE

## 2023-12-17 PROCEDURE — 93228 REMOTE 30 DAY ECG REV/REPORT: CPT | Performed by: INTERNAL MEDICINE

## 2023-12-17 PROCEDURE — 36415 COLL VENOUS BLD VENIPUNCTURE: CPT | Performed by: INTERNAL MEDICINE

## 2023-12-17 PROCEDURE — 97161 PT EVAL LOW COMPLEX 20 MIN: CPT | Mod: GP

## 2023-12-17 PROCEDURE — 36415 COLL VENOUS BLD VENIPUNCTURE: CPT | Performed by: STUDENT IN AN ORGANIZED HEALTH CARE EDUCATION/TRAINING PROGRAM

## 2023-12-17 PROCEDURE — 85018 HEMOGLOBIN: CPT | Performed by: INTERNAL MEDICINE

## 2023-12-17 PROCEDURE — 250N000013 HC RX MED GY IP 250 OP 250 PS 637: Performed by: INTERNAL MEDICINE

## 2023-12-17 PROCEDURE — 250N000011 HC RX IP 250 OP 636: Mod: JZ | Performed by: STUDENT IN AN ORGANIZED HEALTH CARE EDUCATION/TRAINING PROGRAM

## 2023-12-17 PROCEDURE — 250N000013 HC RX MED GY IP 250 OP 250 PS 637: Performed by: STUDENT IN AN ORGANIZED HEALTH CARE EDUCATION/TRAINING PROGRAM

## 2023-12-17 PROCEDURE — 82565 ASSAY OF CREATININE: CPT | Performed by: STUDENT IN AN ORGANIZED HEALTH CARE EDUCATION/TRAINING PROGRAM

## 2023-12-17 PROCEDURE — 99239 HOSP IP/OBS DSCHRG MGMT >30: CPT | Performed by: INTERNAL MEDICINE

## 2023-12-17 RX ORDER — APIXABAN 5 MG (74)
KIT ORAL
Qty: 70 EACH | Refills: 0 | Status: SHIPPED | OUTPATIENT
Start: 2023-12-17 | End: 2024-01-16

## 2023-12-17 RX ORDER — ACETAMINOPHEN 325 MG/1
650 TABLET ORAL EVERY 4 HOURS PRN
COMMUNITY
Start: 2023-12-17

## 2023-12-17 RX ORDER — FERROUS SULFATE 325(65) MG
325 TABLET ORAL
Qty: 30 TABLET | Refills: 0 | Status: SHIPPED | OUTPATIENT
Start: 2023-12-22

## 2023-12-17 RX ORDER — ATORVASTATIN CALCIUM 40 MG/1
40 TABLET, FILM COATED ORAL EVERY EVENING
COMMUNITY
Start: 2023-12-21

## 2023-12-17 RX ADMIN — INSULIN ASPART: 100 INJECTION, SOLUTION INTRAVENOUS; SUBCUTANEOUS at 10:29

## 2023-12-17 RX ADMIN — Medication 950 MG: at 10:18

## 2023-12-17 RX ADMIN — ACETAMINOPHEN 650 MG: 325 TABLET, FILM COATED ORAL at 04:29

## 2023-12-17 RX ADMIN — GUAIFENESIN 600 MG: 600 TABLET, EXTENDED RELEASE ORAL at 10:18

## 2023-12-17 RX ADMIN — ACETAMINOPHEN 650 MG: 325 TABLET, FILM COATED ORAL at 10:15

## 2023-12-17 RX ADMIN — ASPIRIN 81 MG: 81 TABLET, COATED ORAL at 10:18

## 2023-12-17 RX ADMIN — INSULIN GLARGINE 65 UNITS: 100 INJECTION, SOLUTION SUBCUTANEOUS at 10:23

## 2023-12-17 RX ADMIN — ENOXAPARIN SODIUM 120 MG: 150 INJECTION SUBCUTANEOUS at 06:27

## 2023-12-17 RX ADMIN — METOPROLOL SUCCINATE 50 MG: 50 TABLET, EXTENDED RELEASE ORAL at 10:18

## 2023-12-17 RX ADMIN — INSULIN ASPART: 100 INJECTION, SOLUTION INTRAVENOUS; SUBCUTANEOUS at 15:09

## 2023-12-17 ASSESSMENT — ACTIVITIES OF DAILY LIVING (ADL)
ADLS_ACUITY_SCORE: 30

## 2023-12-17 NOTE — DISCHARGE SUMMARY
[facility]    Discharge Summary  Hospitalist    Date of Admission:  12/15/2023  Date of Discharge:  12/17/2023  Discharging Provider: Shelby Mayberry MD    Discharge Diagnoses   Fall  Pulmonary embolism     History of Present Illness   Review admission history and physical.    Hospital Course   Shola Lemus was admitted on 12/15/2023.  The following problems were addressed during his hospitalization:    Principal Problem:    Laceration of intraoral surface of lip, initial encounter  Active Problems:    Syncope and collapse    Closed Le Fort I fracture, initial encounter (H)    Closed fracture of nasal bone, initial encounter    Fall, initial encounter    COVID-19  78M hx of T2D, HTN HLD presenting with syncopal episode following severe coughing. Found to have facial bone fx along with covid.    Right middle lobe pulmonary embolism-segmental and subsegmental  Syncope  Atrial fibrillation, paroxysmal.  COVID 19 infection  Patient presents following a syncopal episode that resulted in a fall and injury on his face, he also had a bout of heavy coughing prior to the syncopal event, patient had 3 weeks worth of persistent cough and cold symptoms, he was diagnosed with COVID-19 here.  He also had another bradycardia and syncopal episode in the ER.  --He was noted to have A-fib on telemetry briefly, Tmax 100.9.,  Troponins are mildly elevated .CT chest showed segmental subsegmental PE, normal CT head and spine, facial bone fracture noted.  Patient was continued on cardiac telemetry, patient seen by cardiology team, they are recommending echocardiogram and anticoagulation for [id number].  He was first started on Lovenox 1 mg/kg body weight twice daily, transition to Eliquis on discharge, patient needs to take his first dose of Eliquis on 12/17/2023 and night since he received morning dose of Lovenox.  Continue PTA metoprolol, echocardiogram noted, no new changes seen, cardiology is  recommending event monitor, patient will be discharged with event monitor.  For COVID-19 Patient started on Paxlovid, patient is not on oxygen now, deferring steroids.  He remained in room air prior to discharge, statins should be held until Paxlovid dose is complete, this was noted in the discharge documents.  Patient wanted to go home, he was ambulating in the room without any significant issues, will arrange for outpatient PT.  Patient had some dark stools noted during his stay here, he also had significant amount of blood swallowed when he had a fall, so this is expected but recommended to monitor this, if this persists beyond next 48 hours he needs to seek help, his hemoglobin remained stable at 12.6 here, will repeat hemoglobin prior to discharge.    LeFort I fracture and nasal bone fractures    Bilateral mildly displaced LeFort type I fractures.Mildly displaced and impacted nasal bone fractures with involvement of the osseous nasal septum. Overlying soft tissue swelling. Bilateral symmetric anterior subluxation of the mandibular condyle with respect to the mandibular fossa. Posttraumatic intrasinus hemorrhage.  He was seen by OMFS in the emergency department, they recommended discharge home with outpatient oral surgery or ENT evaluation.  Pain is controlled with Tylenol.  Continue on soft diet.    Hyponatremia  Resolved  Chronic back pain  Obesity  --Lifestyle modification recommended  T2D  --hold glipizide/meformin, this will be restarted on discharge, his blood sugars were controlled on PTA insulin and sliding scale.       HLD  --resume atorvastatin few days upon discharge.  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  See above, patient will be discharged on a event monitor and follow-up with cardiology outpatient.    Shelby Mayberry MD    Significant Results and Procedures       Pending Results   These results will be followed up by   Unresulted Labs Ordered in the Past  30 Days of this Admission       No orders found from 11/15/2023 to 12/16/2023.            Code Status   Full Code       Primary Care Physician   Bryan Patrick    Physical Exam   Temp: 97.8  F (36.6  C) Temp src: Oral BP: (!) 149/72 Pulse: 81   Resp: 16 SpO2: 95 % O2 Device: None (Room air)    Vitals:    12/15/23 1732 12/16/23 0158   Weight: 124.7 kg (275 lb) 124.7 kg (274 lb 14.6 oz)     Vital Signs with Ranges  Temp:  [97.8  F (36.6  C)-98.6  F (37  C)] 97.8  F (36.6  C)  Pulse:  [] 81  Resp:  [16-18] 16  BP: (115-149)/(71-87) 149/72  SpO2:  [94 %-99 %] 95 %  I/O last 3 completed shifts:  In: 360 [P.O.:360]  Out: 700 [Urine:700]    The patient was examined on the day of discharge.    Discharge Disposition   Discharged to home  Condition at discharge: Stable    Consultations This Hospital Stay   CARE MANAGEMENT / SOCIAL WORK IP CONSULT  PHYSICAL THERAPY ADULT IP CONSULT  OCCUPATIONAL THERAPY ADULT IP CONSULT  CARDIOLOGY IP CONSULT  PHARMACY LIAISON FOR MEDICATION COVERAGE CONSULT    Time Spent on this Encounter   I, Shelby Mayberry MD, personally saw the patient today and spent greater than 30 minutes discharging this patient.    Discharge Orders      Physical Therapy Referral      Reason for your hospital stay    Fall, syncope, covid-19     Activity    Your activity upon discharge: activity as tolerated     Discharge Instructions    Start apixaban tonight, take 10 mg of Eliquis tonight.  Discharge Instructions for COVID-19 Patients  You were tested for COVID-19. Your result was positive. This means you do have COVID-19. Follow the steps below. If you were tested for an upcoming treatment (procedure), you should also contact your care team for next steps.  How can I take care of myself at home?  1. Get lots of rest.  2. Drink extra fluids (unless a doctor has told you not to).  3. Take acetaminophen (Tylenol) for fever or pain. If you have liver or kidney problems, first ask your care team if it's safe to  take acetaminophen.  ? Adults can take either:    650 mg (two 325 mg pills) every 4 to 6 hours as needed (but no more than 10 pills per day), OR?    1,000 mg (two 500 mg pills) every 6 hours as needed (but no more than 6 pills per day).    Note: Don't take more than 3,000 mg of acetaminophen (Tylenol) in one day. Acetaminophen is found in many medicines, even over-the-counter medicines. Read all labels to be sure you don't take too much.  ? For children:    Check the acetaminophen (Tylenol) bottle to find out the right dose based on their age or weight.    Don't give children more than 1,625 mg of Tylenol in one day.  4. Know when to call 911. Emergency warning signs include:  ? Trouble breathing or shortness of breath  ? Pain or pressure in the chest that doesn't go away  ? Feeling confused like you haven't felt before, or not being able to wake up  ? Bluish-colored lips or face  If you have other health problems (like cancer, heart failure, an organ transplant, or severe kidney disease): Call your specialty clinic if you don't feel better in the next 2 days.  How can I protect others?  If you DO have symptoms:    Stay home and away from others (self-isolate):  ? For at least 5 days after your symptoms started, AND UNTIL  ? You've had no fever for 24 hours-without taking any medicine that reduces fever, AND  ? Your other symptoms (such as a cough) are better.    Wear a mask or face covering for 10 full days anytime you're around other people.  If you DON'T have symptoms:    Stay at home and away from others for   at least 5 days after your positive test.    Wear a mask or face covering for 10 full days anytime you're around other people.  If you plan to visit a clinic or hospital, please check their guidelines before you arrive-healthcare sites may have different rules. If you were tested because you're going to have surgery or another treatment, contact your care team for next steps.  If you were very ill from  COVID or have a weak immune system: Stay at home and away from others for at least 10 days. Ask your doctor about other actions you should take.   During self-isolation    Stay home until it's safe to be around others.    At home, stay away from other people and pets. Or, wear a well-fitting mask when you need to be around others.    Monitor your symptoms. If you have any emergency warning signs listed at the link (such as trouble breathing, chest pain that won't go away, or confusion that's new to you), then get emergency medical care right away.    Stay in a separate room from other household members, if possible.    Use a separate bathroom, if possible.    Improve ventilation (air flow) at home, if possible.  Don't share personal household items, like cups, towels, and utensils.   Is there medicine to treat COVID-19?  Yes, there are safe and effective medicines. They may make you feel better faster, keep you out of the hospital, and prevent death.   It's very important to take these medicines early in your illness before you get worse.   Who should take this medicine?   These treatments are for people who are not in the hospital, but who are at risk of getting very sick from COVID. This includes people who:    Are over age 65    Are members of the BIPOC community (Black, indigenous, and people of color)    Are overweight (body mass index is over 25)    Are inactive (don't exercise)    Are pregnant    Smoke or vape (now or in the past)    Have a disability    Have any of the following health problems: diabetes, high blood pressure, cancer, heart problems, liver disease, lung disease, kidney disease, sickle cell disease, cystic fibrosis (CF), dementia and other neuro (brain) diseases, HIV, thalassemia, or tuberculosis (TB)    Have ever had a stroke, organ transplant, or blood cell transplant    Have a mental health problem or substance abuse disorder (drugs, alcohol)    Have a weak immune system (are  "immuno-compromised)  COVID medicines can affect the safety of other medicines you take. It's important to talk to your care team before you take any new medicines. Sometimes you'll need to make short-term changes to your other medicines.  What should I do if I have symptoms now and want to discuss treatments?    Call your family clinic. Or, dial [facility] ([phone]) and say \"COVID\" when prompted, OR    Go to [email or web address] (click \"Message Your Care Team\"), OR    Request an appointment on [employer]  When can I go back to work?  You should NOT go back to work until you meet the guidelines on page 1. (See the \"How can I protect others?\" section.) You don't need to be re-tested for COVID before going back to work. Studies show that you won't spread the virus if it's been at least 10 days since your symptoms started (or 20 days if you have a weak immune system).  Employers, schools, and daycares: This document serves as formal notice of medical guidelines before your employee or student can return to work or school. They must meet the guidelines in the \"How can I protect others?\" section on page 1 before going back in person.   Where can I get more information?    [facility] - About COVID-19:   [email or web address]    CDC - If You Are Sick or Caring for Someone:   www.cdc.gov/coronavirus/2019-ncov/if-you-are-sick/index.html    CDC - Isolation and Precautions for People with COVID-19:   www.cdc.gov/coronavirus/2019-ncov/your-health/isolation.html    [facility] Clinical trials (COVID-19 research studies):   [email or web address]/[email or web address]-updates/[email or web address]-[email or web address]-trials    For informational purposes only. Not to replace the advice of your health care provider. Clinically reviewed by Dr. Jose J Lares. Copyright   2020 [facility]. All rights reserved. [employer] [id number] - REV 09/23.     Follow-up and recommended labs and tests     Follow up with primary care " provider, Bryan Patrick, within 7 days for hospital follow- up.  The following labs/tests are recommended: cbc and basic metabolic panel in 1 week.  Please follow-up with ENT as recommended, patient discharged with cardiac event monitor, please follow-up with the GI if he has ongoing dark stools and hemoglobin drop.  Patient did have some blood in stools or dark tarry stools due to him swallowing blood during fall and fracture.     Adult Cardiac Event Monitor     Diet    Follow this diet upon discharge: Orders Placed This Encounter      Mechanical/Dental Soft Diet     Discharge Medications   Current Discharge Medication List        START taking these medications    Details   acetaminophen (TYLENOL) 325 MG tablet Take 2 tablets (650 mg) by mouth every 4 hours as needed for mild pain or other (and adjunct with moderate or severe pain or per patient request)      Apixaban Starter Pack (ELIQUIS DVT/PE STARTER PACK) 5 MG TBPK Take 10 mg by mouth 2 times daily for 7 days, THEN 5 mg 2 times daily for 23 days.  Qty: 70 each, Refills: 0    Associated Diagnoses: Syncope and collapse; Other acute pulmonary embolism without acute cor pulmonale (H)      nirmatrelvir and ritonavir (PAXLOVID) 300 mg/100 mg therapy pack Take 3 tablets by mouth 2 times daily for 4 days . Finish remainder of Paxlovid therapy pack that was started in the hospital.  Follow instructions on that pack.    Comments: This order will not be sent to a retail pharmacy. This order is intended for the AVS summary and for continuation of the remainder of the Paxlovid pack dispensed by the inpatient pharmacy at home. Confirm that the patient has received the inpatient pharmacy supplied Paxlovid to take home.  Associated Diagnoses: COVID-19           CONTINUE these medications which have CHANGED    Details   atorvastatin (LIPITOR) 40 MG tablet Take 1 tablet (40 mg) by mouth every evening    Comments: Hold while on paxlovid           CONTINUE these medications  which have NOT CHANGED    Details   aspirin 81 MG EC tablet Take 81 mg by mouth daily      glipiZIDE (GLUCOTROL) 10 MG tablet Take 10 mg by mouth 2 times daily (before meals)      insulin glargine (LANTUS PEN) 100 UNIT/ML pen Inject 65 Units Subcutaneous 2 times daily      metFORMIN (GLUCOPHAGE) 500 MG tablet Take 1,000 mg by mouth 2 times daily (with meals)      metoprolol succinate ER (TOPROL XL) 50 MG 24 hr tablet Take 50 mg by mouth daily           Allergies   No Known Allergies  Data   Most Recent 3 CBC's:  Recent Labs   Lab Test 12/16/23  0830 12/15/23  1734 06/04/22  0113   WBC 6.0 10.1  --    HGB 12.6* 13.0* 15.3   MCV 92 92  --     230  --       Most Recent 3 BMP's:  Recent Labs   Lab Test 12/17/23  0829 12/17/23  0735 12/17/23  0257 12/16/23 2034 12/16/23  0857 12/16/23 0830 12/15/23  2334 12/15/23  1734 06/04/22  0113   0000   NA  --   --   --   --   --  137  --  131* 139  --    POTASSIUM  --   --   --   --   --  4.1  --  4.0 3.7  --    CHLORIDE  --   --   --   --   --  102  --  96*  --   --    CO2  --   --   --   --   --  27  --  25  --   --    BUN  --   --   --   --   --  11.4  --  11.7  --   --    CR  --  0.86  --   --   --  0.84  --  0.95  --   --    ANIONGAP  --   --   --   --   --  8  --  10  --   --    RHONDA  --   --   --   --   --  8.2*  --  8.5*  --   --    *  --  116* 185*   < > 168*   < > 135*  --    < >    < > = values in this interval not displayed.     Most Recent 2 LFT's:  Recent Labs   Lab Test 12/16/23  0830 12/15/23  1734   AST 26 31   ALT 24 27   ALKPHOS 76 84   BILITOTAL 0.4 0.8     Most Recent INR's and Anticoagulation Dosing History:  Anticoagulation Dose History           No data to display              Most Recent 3 Troponin's:No lab results found.  Most Recent Cholesterol Panel:No lab results found.  Most Recent 6 Bacteria Isolates From Any Culture (See EPIC Reports for Culture Details):No lab results found.  Most Recent TSH, T4 and A1c Labs:  Recent Labs   Lab Test  12/15/23  1734   A1C 7.9*     Results for orders placed or performed during the hospital encounter of 12/15/23   CT Facial Bones without Contrast    Narrative    EXAM: CT HEAD W/O CONTRAST, CT CERVICAL SPINE W/O CONTRAST, CT FACIAL BONES WITHOUT CONTRAST  LOCATION: [facility]  DATE/TIME: 12/15/2023 8:23 PM CST    INDICATION: fell, hit head, LOC  COMPARISON: None.  TECHNIQUE:   1) Routine CT Head without IV contrast. Multiplanar reformats. Dose reduction techniques were used.  2) Routine CT Facial Bones without IV contrast. Multiplanar reformats. Dose reduction techniques were used.  3) Routine CT Cervical Spine without IV contrast. Multiplanar reformats. Dose reduction techniques were used.    FINDINGS:  HEAD CT:   INTRACRANIAL CONTENTS: No intracranial hemorrhage, extraaxial collection, or mass effect.  No CT evidence of acute infarct. Mild presumed chronic small vessel ischemic changes. Mild to moderate generalized volume loss. No hydrocephalus.     BONES/SOFT TISSUES: No acute abnormality.    FACIAL BONE CT:   OSSEOUS STRUCTURES/SOFT TISSUES:  Frontal and nasal soft tissue swelling/inflammation. Mildly displaced and impacted nasal bone fractures with involvement of the osseous nasal septum. Bilateral mildly displaced Le Fort I fractures.  Bilateral symmetric anterior mandibular subluxation.    ORBITAL CONTENTS: No intraorbital abnormality.     SINUSES:  Scattered paranasal sinus mucosal thickening as well as hemorrhage.     CERVICAL SPINE CT:  VERTEBRA: Normal vertebral body heights and alignment. No acute compression fracture or posttraumatic subluxation.     CANAL/FORAMINA: Multilevel spondylosis without high grade canal stenosis.    PARASPINAL: No prevertebral edema. Incidental superficial lipoma in the posterior paraspinal soft tissues.      Impression    IMPRESSION:    HEAD CT:  1.  No acute intracranial process.    FACIAL BONE CT:  1.  Bilateral mildly displaced LeFort type I  fractures.  2.  Mildly displaced and impacted nasal bone fractures with involvement of the osseous nasal septum. Overlying soft tissue swelling.  3.  Bilateral symmetric anterior subluxation of the mandibular condyle with respect to the mandibular fossa. This could be physiologic or posttraumatic. Correlate with physical examination.  4.  Posttraumatic intrasinus hemorrhage.    CERVICAL SPINE CT:  1.  No acute cervical spine fracture.   CT Cervical Spine w/o Contrast    Narrative    EXAM: CT HEAD W/O CONTRAST, CT CERVICAL SPINE W/O CONTRAST, CT FACIAL BONES WITHOUT CONTRAST  LOCATION: [facility]  DATE/TIME: 12/15/2023 8:23 PM CST    INDICATION: fell, hit head, LOC  COMPARISON: None.  TECHNIQUE:   1) Routine CT Head without IV contrast. Multiplanar reformats. Dose reduction techniques were used.  2) Routine CT Facial Bones without IV contrast. Multiplanar reformats. Dose reduction techniques were used.  3) Routine CT Cervical Spine without IV contrast. Multiplanar reformats. Dose reduction techniques were used.    FINDINGS:  HEAD CT:   INTRACRANIAL CONTENTS: No intracranial hemorrhage, extraaxial collection, or mass effect.  No CT evidence of acute infarct. Mild presumed chronic small vessel ischemic changes. Mild to moderate generalized volume loss. No hydrocephalus.     BONES/SOFT TISSUES: No acute abnormality.    FACIAL BONE CT:   OSSEOUS STRUCTURES/SOFT TISSUES:  Frontal and nasal soft tissue swelling/inflammation. Mildly displaced and impacted nasal bone fractures with involvement of the osseous nasal septum. Bilateral mildly displaced Le Fort I fractures.  Bilateral symmetric anterior mandibular subluxation.    ORBITAL CONTENTS: No intraorbital abnormality.     SINUSES:  Scattered paranasal sinus mucosal thickening as well as hemorrhage.     CERVICAL SPINE CT:  VERTEBRA: Normal vertebral body heights and alignment. No acute compression fracture or posttraumatic subluxation.      CANAL/FORAMINA: Multilevel spondylosis without high grade canal stenosis.    PARASPINAL: No prevertebral edema. Incidental superficial lipoma in the posterior paraspinal soft tissues.      Impression    IMPRESSION:    HEAD CT:  1.  No acute intracranial process.    FACIAL BONE CT:  1.  Bilateral mildly displaced LeFort type I fractures.  2.  Mildly displaced and impacted nasal bone fractures with involvement of the osseous nasal septum. Overlying soft tissue swelling.  3.  Bilateral symmetric anterior subluxation of the mandibular condyle with respect to the mandibular fossa. This could be physiologic or posttraumatic. Correlate with physical examination.  4.  Posttraumatic intrasinus hemorrhage.    CERVICAL SPINE CT:  1.  No acute cervical spine fracture.   Head CT w/o contrast    Narrative    EXAM: CT HEAD W/O CONTRAST, CT CERVICAL SPINE W/O CONTRAST, CT FACIAL BONES WITHOUT CONTRAST  LOCATION: [facility]  DATE/TIME: 12/15/2023 8:23 PM CST    INDICATION: fell, hit head, LOC  COMPARISON: None.  TECHNIQUE:   1) Routine CT Head without IV contrast. Multiplanar reformats. Dose reduction techniques were used.  2) Routine CT Facial Bones without IV contrast. Multiplanar reformats. Dose reduction techniques were used.  3) Routine CT Cervical Spine without IV contrast. Multiplanar reformats. Dose reduction techniques were used.    FINDINGS:  HEAD CT:   INTRACRANIAL CONTENTS: No intracranial hemorrhage, extraaxial collection, or mass effect.  No CT evidence of acute infarct. Mild presumed chronic small vessel ischemic changes. Mild to moderate generalized volume loss. No hydrocephalus.     BONES/SOFT TISSUES: No acute abnormality.    FACIAL BONE CT:   OSSEOUS STRUCTURES/SOFT TISSUES:  Frontal and nasal soft tissue swelling/inflammation. Mildly displaced and impacted nasal bone fractures with involvement of the osseous nasal septum. Bilateral mildly displaced Le [place] I fractures.  Bilateral  symmetric anterior mandibular subluxation.    ORBITAL CONTENTS: No intraorbital abnormality.     SINUSES:  Scattered paranasal sinus mucosal thickening as well as hemorrhage.     CERVICAL SPINE CT:  VERTEBRA: Normal vertebral body heights and alignment. No acute compression fracture or posttraumatic subluxation.     CANAL/FORAMINA: Multilevel spondylosis without high grade canal stenosis.    PARASPINAL: No prevertebral edema. Incidental superficial lipoma in the posterior paraspinal soft tissues.      Impression    IMPRESSION:    HEAD CT:  1.  No acute intracranial process.    FACIAL BONE CT:  1.  Bilateral mildly displaced LeFort type I fractures.  2.  Mildly displaced and impacted nasal bone fractures with involvement of the osseous nasal septum. Overlying soft tissue swelling.  3.  Bilateral symmetric anterior subluxation of the mandibular condyle with respect to the mandibular fossa. This could be physiologic or posttraumatic. Correlate with physical examination.  4.  Posttraumatic intrasinus hemorrhage.    CERVICAL SPINE CT:  1.  No acute cervical spine fracture.   XR Chest Port 1 View    Narrative    EXAM: XR CHEST PORT 1 VIEW  LOCATION: [facility]  DATE: 12/15/2023    INDICATION: Cough and shortness of breath.  COMPARISON: None available.      Impression    IMPRESSION: Mild left basilar atelectasis and possible small left pleural effusion. No pneumothorax.    Upper limits of normal heart size. Atherosclerosis of the thoracic aorta.   CT Chest Pulmonary Embolism w Contrast     Value    Radiologist flags New diagnosis of pulmonary embolism (AA)    Narrative    EXAM: CT CHEST PULMONARY EMBOLISM W CONTRAST  LOCATION: [facility]  DATE: 12/16/2023    INDICATION: tachycardic, Trop elevated, syncope. r o PE  COMPARISON: 12/15/2023  TECHNIQUE: CT chest pulmonary angiogram during arterial phase injection of IV contrast. Multiplanar reformats and MIP reconstructions  were performed. Dose reduction techniques were used.   CONTRAST: 82  ml Isovue 370    FINDINGS:  ANGIOGRAM CHEST: There are small pulmonary emboli in the right middle lobe medial segment (images 165 - 177 of series 3). No central or saddle embolism. Thoracic aorta is negative for dissection. No CT evidence of right heart strain.    LUNGS AND PLEURA: Dependent subsegmental atelectasis in both lungs. Calcified right basilar granuloma. No pleural effusion.    MEDIASTINUM/AXILLAE: Normal heart size. No pericardial effusion.    CORONARY ARTERY CALCIFICATION: Mild.    UPPER ABDOMEN: Calcified splenic granulomas.    MUSCULOSKELETAL: Normal.      Impression    IMPRESSION:  1.  Examination positive for segmental and subsegmental right middle lobe pulmonary embolism. No central embolism.      [Critical Result: New diagnosis of pulmonary embolism]    Finding was identified on 2023 5:06 AM CST.     1.  Alley, the patient's nurse, was contacted by me on 2023 5:26 AM CST and verbalized understanding of the critical result.    Echocardiogram Limited     Value    LVEF  60-65%    Narrative    [id number]  [id number]  [id number]  [id number]^EDWARD^THONY     [facility]  Echocardiography Laboratory  [address] [place] [address]     Name: JOSE HORTA  MRN: [id number]  : 1945  Study Date: 2023 01:08 PM  Age: 78 yrs  Gender: Male  Patient Location: [facility]  Reason For Study: Syncope  Ordering Physician: THONY LEON  Referring Physician: Bryan Patrick MD  Performed By: Genie Osborne     BSA: 2.5 m2  Height: 75 in  Weight: 275 lb  HR: 93  BP: 113/69 mmHg  ______________________________________________________________________________  Procedure  Limited Portable Echo Adult. Optison (NDC #[id number]) given intravenously.  ______________________________________________________________________________  Interpretation Summary     Technically difficult study.     Left  ventricular systolic function is normal.The visual ejection fraction is  60-65%.  The right ventricular systolic function is normal.The right ventricle is  mildly dilated.  Aortic valve sclerosis noted.  ______________________________________________________________________________  Left Ventricle  Left ventricular systolic function is normal. The visual ejection fraction is  60-65%. No regional wall motion abnormalities noted.     Right Ventricle  The right ventricle is mildly dilated. The right ventricular systolic function  is normal.     Atria  Normal left atrial size. Right atrial size is normal.     Mitral Valve  There is trace mitral regurgitation.     Tricuspid Valve  There is trace tricuspid regurgitation. Right ventricular systolic pressure  could not be approximated due to inadequate tricuspid regurgitation.     Aortic Valve  The aortic valve is not well visualized. Aortic valve sclerosis noted. No  aortic regurgitation is present. No aortic stenosis is present.     Vessels  Inferior vena cava not well visualized for estimation of right atrial  pressure.     Pericardium  There is no pericardial effusion.     ______________________________________________________________________________  Report approved by: Smith Ross 12/16/2023 02:29 PM     ______________________________________________________________________________

## 2023-12-17 NOTE — PROGRESS NOTES
Up w/ SBA. Denies dizziness, lightheaded, sob, cp. Reports facial pain r/t fall and fx - given PRN tylenol w/ good relief. IV SL. Tolerating soft diet. Adequate I/O. BM x2 this shift. ECHO completed. Tele NSR.

## 2023-12-17 NOTE — PROGRESS NOTES
"[facility] Progress Note     Peewee Kraft MD  12/17/2023         Interval History:      No more episode of dizziness, lightheadedness, telemetry reviewed no significant arrhythmia noted.,  Echocardiogram study showed normal LV RV systolic function with mild dilated RV.  No more episode of atrial fibrillation flutter noted.       Assessment and Plan:      Syncope sounds like cough and syncope.  On telemetry no major culprit noted.  Recommend 30-day cardiac event monitor at discharge.  Brief episode of atrial fibrillation, no recurrence noted on telemetry.  [id number] of 4.  Pulm embolism.  On anticoagulation.  Defer transition to NOAC to internal medicine team.  Bifascicular block on EKG.  Not new.  No significant bradyarrhythmia noted.    Recommendations  Overall syncopal episode sounds like more cough induced syncope then arrhythmia induced syncope.  No culprit arrhythmia noted so far on telemetry.  Recommend 30-day cardiac event monitor at discharge.    Cardiology will sign off.  Please feel free to call with any question.       Physical Exam:       , Blood pressure (!) 149/72, pulse 81, temperature 97.8  F (36.6  C), temperature source Oral, resp. rate 16, height 1.905 m (6' 3\"), weight 124.7 kg (274 lb 14.6 oz), SpO2 95%.  Vitals:    12/15/23 1732 12/16/23 0158   Weight: 124.7 kg (275 lb) 124.7 kg (274 lb 14.6 oz)     Vital Signs with Ranges  Temp:  [97.8  F (36.6  C)-98.6  F (37  C)] 97.8  F (36.6  C)  Pulse:  [] 81  Resp:  [16-18] 16  BP: (115-149)/(71-87) 149/72  SpO2:  [94 %-99 %] 95 %  I/O's Last 24 hours  I/O last 3 completed shifts:  In: 360 [P.O.:360]  Out: 700 [Urine:700]  General patient appears comfortable  Neck normal JVP  Cardiovascular system S1-S2 normal no murmur rub or gallop  Respiration clear to auscultation  Extremities no edema  HEENT significant bruising and ecchymosis noted around the face             Medications:         aspirin  81 mg Oral Daily    [Held by provider] atorvastatin " " 40 mg Oral QPM    calcium citrate  950 mg Oral Daily    enoxaparin ANTICOAGULANT  1 mg/kg Subcutaneous Q12H    guaiFENesin  600 mg Oral BID    insulin aspart   Subcutaneous TID w/meals    insulin aspart  1-10 Units Subcutaneous TID AC    insulin aspart  1-7 Units Subcutaneous At Bedtime    insulin glargine  65 Units Subcutaneous BID    metoprolol succinate ER  50 mg Oral Daily    nirmatrelvir and ritonavir  3 tablet Oral BID     PRN Meds: acetaminophen **OR** acetaminophen, glucose **OR** dextrose **OR** glucagon, hydrALAZINE, melatonin, ondansetron **OR** ondansetron, senna-docusate **OR** senna-docusate         Data:      All new lab and imaging data was reviewed.   Recent Labs   Lab Test 12/16/23  0830 12/15/23  1734 06/04/22  0113   WBC 6.0 10.1  --    HGB 12.6* 13.0* 15.3   MCV 92 92  --     230  --       Recent Labs   Lab Test 12/17/23  0829 12/17/23  0735 12/17/23  0257 12/16/23  2034 12/16/23  0857 12/16/23  0830 12/15/23  2334 12/15/23  1734 06/04/22  0113   0000   NA  --   --   --   --   --  137  --  131* 139  --    POTASSIUM  --   --   --   --   --  4.1  --  4.0 3.7  --    CHLORIDE  --   --   --   --   --  102  --  96*  --   --    CO2  --   --   --   --   --  27  --  25  --   --    BUN  --   --   --   --   --  11.4  --  11.7  --   --    CR  --  0.86  --   --   --  0.84  --  0.95  --   --    ANIONGAP  --   --   --   --   --  8  --  10  --   --    RHONDA  --   --   --   --   --  8.2*  --  8.5*  --   --    *  --  116* 185*   < > 168*   < > 135*  --    < >    < > = values in this interval not displayed.     No lab results found.    Invalid input(s): \"TROP\", \"TROPONINIES\"     Peewee Kraft MD  12/17/2023  Pager:  [phone]    "

## 2023-12-17 NOTE — PLAN OF CARE
Goal Outcome Evaluation:    Summary:  Fall due to syncopal episode   Behavior & Aggression: green  Fall Risk: yes  Orientation: AxOx4  ABNL VS/O2: VSS on RA  ABNL Labs: trop: 51  Pain Management: tylenol x1 for head  Bowel/Bladder: continent   Drains: PIV SL  Diet: soft mechanical   Activity Level: SBA  Tests/Procedures:    Anticipated  DC Date: possible discharge today  Significant Information:  Patient has laceration on lip, fractured nose, has been seen by oral and maxillofacial surgery. COVID+. Positive for small PE Right middle lobe

## 2023-12-17 NOTE — PROGRESS NOTES
12/17/23 1300   Appointment Info   Signing Clinician's Name / Credentials (PT) Malissa Jonas DPT   Living Environment   People in Home spouse   Current Living Arrangements house   Home Accessibility stairs within home   Number of Stairs, Main Entrance 9;6  (( going up, 6 going down))   Stair Railings, Main Entrance railings safe and in good condition;railings on both sides of stairs   Transportation Anticipated car, drives self   Living Environment Comments Lives with wife in split level home, 6/9 stairs to enter. Has tub, no grab bars   Self-Care   Usual Activity Tolerance excellent   Current Activity Tolerance good   Regular Exercise Yes   Activity/Exercise Type walking   Exercise Amount/Frequency 3-5 times/wk   Equipment Currently Used at Home none   Fall history within last six months yes   Number of times patient has fallen within last six months 1   Activity/Exercise/Self-Care Comment IND with all ADLs, no AD. Walks dog every other day 1 mile.   General Information   Onset of Illness/Injury or Date of Surgery 12/15/23   Referring Physician Ubaldo Pugh MD   Patient/Family Therapy Goals Statement (PT) Go home   Pertinent History of Current Problem (include personal factors and/or comorbidities that impact the POC) 78M hx of T2D, HTN HLD presenting with syncopal episode following severe coughing. Found to have facial bone fx along with covid.   Existing Precautions/Restrictions fall   Cognition   Affect/Mental Status (Cognition) WFL   Orientation Status (Cognition) oriented x 4   Follows Commands (Cognition) WFL   Pain Assessment   Patient Currently in Pain No   Integumentary/Edema   Integumentary/Edema no deficits were identifed   Posture    Posture Forward head position;Kyphosis   Range of Motion (ROM)   Range of Motion ROM is WFL   Strength (Manual Muscle Testing)   Strength (Manual Muscle Testing) strength is WFL   Bed Mobility   Comment, (Bed Mobility) IND on flat bed   Transfers   Comment,  (Transfers) IND sit<>stand no AD   Gait/Stairs (Locomotion)   Comment, (Gait/Stairs) Amb 75ft no AD, no LOB. Trialed steps in room x 9 with handrail, pt completed IND no fatigue or SOB   Balance   Balance Comments IND no AD   Clinical Impression   Criteria for Skilled Therapeutic Intervention Yes, treatment indicated   PT Diagnosis (PT) decreased activity tolerance   Influenced by the following impairments weakness, cough   Functional limitations due to impairments fallrisk   Clinical Presentation (PT Evaluation Complexity) stable   Clinical Presentation Rationale clinical judgement   Clinical Decision Making (Complexity) low complexity   Planned Therapy Interventions (PT) balance training;home exercise program   Risk & Benefits of therapy have been explained evaluation/treatment results reviewed;care plan/treatment goals reviewed;risks/benefits reviewed;current/potential barriers reviewed;participants voiced agreement with care plan;participants included;patient   PT Total Evaluation Time   PT Eval, Low Complexity Minutes (50085) 25   Physical Therapy Goals   PT Frequency One time eval and treatment only   PT Predicted Duration/Target Date for Goal Attainment 12/17/23   PT Goals Bed Mobility;Gait;Transfers;Stairs   PT: Bed Mobility Independent;Supine to/from sit;Rolling;Bridging   PT: Transfers Independent;Sit to/from stand;Bed to/from chair;Assistive device   PT: Gait Independent;150 feet   PT: Stairs Independent;9 stairs   Interventions   Interventions Quick Adds Self-Care/Home Mgmt   Self-Care/Home Management   Self-Care/Home Mgmt/ADL, Compensatory, Meal Prep Minutes (26796) 14   Symptoms Noted During/After Treatment none   Treatment Detail/Skilled Intervention Pt greeted bedside, evaluation completed with gait, steps and bed mobility as above. Edu on dressing in seated position to prevent falls. Pt completed dressing IND, increased time for seuqencing and cues for seated posture. Recommend seated for home. Pt  had many questions on blood thinners, recommend pt follow up with hospitalist. No further treatment needed .   PT Discharge Planning   PT Plan DC   PT Discharge Recommendation (DC Rec) home with assist;home with outpatient physical therapy   PT Rationale for DC Rec REcommend home with OPPT to improve strength and balance. Pt close ot baseline though has some mild decontitioning from COVID, receommend OP to progress back to walking 1 miles daily with dog.   PT Brief overview of current status IND   Total Session Time   Timed Code Treatment Minutes 14   Total Session Time (sum of timed and untimed services) 39   .Physical Therapy Discharge Summary    Reason for therapy discharge:    All goals and outcomes met, no further needs identified.    Progress towards therapy goal(s). See goals on Care Plan in [facility] electronic health record for goal details.  Goals met    Therapy recommendation(s):    Continued therapy is recommended.  Rationale/Recommendations:  REcommend home with OPPT to improve strength and balance. Pt close ot baseline though has some mild decontitioning from COVID, receommend OP to progress back to walking 1 miles daily with dog..

## 2023-12-17 NOTE — PLAN OF CARE
OT: Order received, chart reviewed and discussed with care team. Per PT, patient was able to dress self independently while in the room and has no acute care OT needs at this time. Defer discharge recommendations to PT and care team. Will complete OT orders.

## 2023-12-17 NOTE — PLAN OF CARE
"Alert and oriented x's 4.  Up independently in the room.  Pt has infrequent cough.  C/O congestion.  Bruising on face, bilateral eyes. Pt states that he's had 2 \"black\" stools today, MD aware.  Pt to discharge and have f/unit(s) CBC per MD.  Will continue to monitor.                      "

## 2023-12-17 NOTE — CONSULTS
CTS consult acknowledged.  No needs identified by chart review. Patient with weakness due to COVID, but per PT recommendation; home at discharge.  Please re consult if a need is identified.

## 2023-12-18 NOTE — PLAN OF CARE
Goal Outcome Evaluation:       Discharge today to home at 1750, ride provided by spouse. Discharge instructions and medications given. All questions answered pt verbalized understanding. Pt reported having all belongings.

## 2023-12-21 ENCOUNTER — HOSPITAL ENCOUNTER (INPATIENT)
Facility: CLINIC | Age: 78
LOS: 1 days | Discharge: HOME | DRG: 314 | End: 2023-12-22
Attending: EMERGENCY MEDICINE | Admitting: SURGERY
Payer: MEDICARE

## 2023-12-21 ENCOUNTER — APPOINTMENT (OUTPATIENT)
Dept: CT IMAGING | Facility: CLINIC | Age: 78
DRG: 314 | End: 2023-12-21
Attending: EMERGENCY MEDICINE
Payer: MEDICARE

## 2023-12-21 ENCOUNTER — HOSPITAL ENCOUNTER (EMERGENCY)
Facility: CLINIC | Age: 78
Discharge: OTHER INSTITUTION | DRG: 314 | End: 2023-12-21
Attending: STUDENT IN AN ORGANIZED HEALTH CARE EDUCATION/TRAINING PROGRAM | Admitting: STUDENT IN AN ORGANIZED HEALTH CARE EDUCATION/TRAINING PROGRAM
Payer: MEDICARE

## 2023-12-21 VITALS
DIASTOLIC BLOOD PRESSURE: 65 MMHG | HEART RATE: 64 BPM | WEIGHT: 270 LBS | HEIGHT: 75 IN | OXYGEN SATURATION: 93 % | TEMPERATURE: 97 F | BODY MASS INDEX: 33.57 KG/M2 | RESPIRATION RATE: 14 BRPM | SYSTOLIC BLOOD PRESSURE: 133 MMHG

## 2023-12-21 DIAGNOSIS — R04.0 EPISTAXIS: ICD-10-CM

## 2023-12-21 DIAGNOSIS — S02.411A CLOSED LE FORT I FRACTURE, INITIAL ENCOUNTER (H): ICD-10-CM

## 2023-12-21 PROBLEM — S02.92XA: Status: ACTIVE | Noted: 2023-12-21
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
## 2023-12-21 LAB
ANION GAP SERPL CALCULATED.3IONS-SCNC: 8 MMOL/L (ref 7–15)
APTT PPP: 30 SECONDS (ref 22–38)
BASOPHILS # BLD AUTO: 0 10E3/UL (ref 0–0.2)
BASOPHILS NFR BLD AUTO: 0 %
BUN SERPL-MCNC: 13.1 MG/DL (ref 8–23)
CALCIUM SERPL-MCNC: 8.4 MG/DL (ref 8.8–10.2)
CHLORIDE SERPL-SCNC: 100 MMOL/L (ref 98–107)
CREAT SERPL-MCNC: 0.86 MG/DL (ref 0.67–1.17)
DEPRECATED HCO3 PLAS-SCNC: 27 MMOL/L (ref 22–29)
EGFRCR SERPLBLD CKD-EPI 2021: 90 ML/MIN/1.73M2
EOSINOPHIL # BLD AUTO: 0.3 10E3/UL (ref 0–0.7)
EOSINOPHIL NFR BLD AUTO: 4 %
ERYTHROCYTE [DISTWIDTH] IN BLOOD BY AUTOMATED COUNT: 13.9 % (ref 10–15)
GLUCOSE BLDC GLUCOMTR-MCNC: 121 MG/DL (ref 70–99)
GLUCOSE BLDC GLUCOMTR-MCNC: 126 MG/DL (ref 70–99)
GLUCOSE BLDC GLUCOMTR-MCNC: 144 MG/DL (ref 70–99)
GLUCOSE SERPL-MCNC: 125 MG/DL (ref 70–99)
HCT VFR BLD AUTO: 37.9 % (ref 40–53)
HGB BLD-MCNC: 11.9 G/DL (ref 13.3–17.7)
HGB BLD-MCNC: 12.2 G/DL (ref 13.3–17.7)
HOLD SPECIMEN: NORMAL
IMM GRANULOCYTES # BLD: 0 10E3/UL
IMM GRANULOCYTES NFR BLD: 0 %
INR PPP: 1.19 (ref 0.85–1.15)
LYMPHOCYTES # BLD AUTO: 2 10E3/UL (ref 0.8–5.3)
LYMPHOCYTES NFR BLD AUTO: 29 %
MCH RBC QN AUTO: 30 PG (ref 26.5–33)
MCHC RBC AUTO-ENTMCNC: 32.2 G/DL (ref 31.5–36.5)
MCV RBC AUTO: 93 FL (ref 78–100)
MONOCYTES # BLD AUTO: 0.7 10E3/UL (ref 0–1.3)
MONOCYTES NFR BLD AUTO: 10 %
NEUTROPHILS # BLD AUTO: 4 10E3/UL (ref 1.6–8.3)
NEUTROPHILS NFR BLD AUTO: 57 %
NRBC # BLD AUTO: 0 10E3/UL
NRBC BLD AUTO-RTO: 0 /100
PLATELET # BLD AUTO: 244 10E3/UL (ref 150–450)
POTASSIUM SERPL-SCNC: 4.6 MMOL/L (ref 3.4–5.3)
RADIOLOGIST FLAGS: ABNORMAL
RADIOLOGIST FLAGS: ABNORMAL
RBC # BLD AUTO: 4.07 10E6/UL (ref 4.4–5.9)
SODIUM SERPL-SCNC: 135 MMOL/L (ref 135–145)
UFH PPP CHRO-ACNC: >1.1 IU/ML
WBC # BLD AUTO: 7.1 10E3/UL (ref 4–11)

## 2023-12-21 PROCEDURE — 70496 CT ANGIOGRAPHY HEAD: CPT | Mod: 26 | Performed by: STUDENT IN AN ORGANIZED HEALTH CARE EDUCATION/TRAINING PROGRAM

## 2023-12-21 PROCEDURE — 258N000003 HC RX IP 258 OP 636: Performed by: STUDENT IN AN ORGANIZED HEALTH CARE EDUCATION/TRAINING PROGRAM

## 2023-12-21 PROCEDURE — 36415 COLL VENOUS BLD VENIPUNCTURE: CPT | Performed by: EMERGENCY MEDICINE

## 2023-12-21 PROCEDURE — 70450 CT HEAD/BRAIN W/O DYE: CPT | Mod: XS

## 2023-12-21 PROCEDURE — G1010 CDSM STANSON: HCPCS | Performed by: STUDENT IN AN ORGANIZED HEALTH CARE EDUCATION/TRAINING PROGRAM

## 2023-12-21 PROCEDURE — 85610 PROTHROMBIN TIME: CPT | Performed by: EMERGENCY MEDICINE

## 2023-12-21 PROCEDURE — 85004 AUTOMATED DIFF WBC COUNT: CPT | Performed by: EMERGENCY MEDICINE

## 2023-12-21 PROCEDURE — 99285 EMERGENCY DEPT VISIT HI MDM: CPT | Performed by: EMERGENCY MEDICINE

## 2023-12-21 PROCEDURE — 258N000003 HC RX IP 258 OP 636: Performed by: EMERGENCY MEDICINE

## 2023-12-21 PROCEDURE — 85730 THROMBOPLASTIN TIME PARTIAL: CPT | Performed by: EMERGENCY MEDICINE

## 2023-12-21 PROCEDURE — 99285 EMERGENCY DEPT VISIT HI MDM: CPT | Mod: 25 | Performed by: EMERGENCY MEDICINE

## 2023-12-21 PROCEDURE — 250N000013 HC RX MED GY IP 250 OP 250 PS 637: Performed by: STUDENT IN AN ORGANIZED HEALTH CARE EDUCATION/TRAINING PROGRAM

## 2023-12-21 PROCEDURE — 80048 BASIC METABOLIC PNL TOTAL CA: CPT | Performed by: STUDENT IN AN ORGANIZED HEALTH CARE EDUCATION/TRAINING PROGRAM

## 2023-12-21 PROCEDURE — 120N000002 HC R&B MED SURG/OB UMMC

## 2023-12-21 PROCEDURE — 99207 PR NO CHARGE LOS: CPT

## 2023-12-21 PROCEDURE — 36415 COLL VENOUS BLD VENIPUNCTURE: CPT | Performed by: STUDENT IN AN ORGANIZED HEALTH CARE EDUCATION/TRAINING PROGRAM

## 2023-12-21 PROCEDURE — 82962 GLUCOSE BLOOD TEST: CPT

## 2023-12-21 PROCEDURE — 250N000009 HC RX 250: Performed by: STUDENT IN AN ORGANIZED HEALTH CARE EDUCATION/TRAINING PROGRAM

## 2023-12-21 PROCEDURE — 250N000013 HC RX MED GY IP 250 OP 250 PS 637: Performed by: PHYSICIAN ASSISTANT

## 2023-12-21 PROCEDURE — 85025 COMPLETE CBC W/AUTO DIFF WBC: CPT | Performed by: STUDENT IN AN ORGANIZED HEALTH CARE EDUCATION/TRAINING PROGRAM

## 2023-12-21 PROCEDURE — 80299 QUANTITATIVE ASSAY DRUG: CPT | Performed by: EMERGENCY MEDICINE

## 2023-12-21 PROCEDURE — 85018 HEMOGLOBIN: CPT | Performed by: STUDENT IN AN ORGANIZED HEALTH CARE EDUCATION/TRAINING PROGRAM

## 2023-12-21 PROCEDURE — 250N000012 HC RX MED GY IP 250 OP 636 PS 637: Performed by: PHYSICIAN ASSISTANT

## 2023-12-21 PROCEDURE — 30903 CONTROL OF NOSEBLEED: CPT | Mod: RT

## 2023-12-21 PROCEDURE — 093K7ZZ CONTROL BLEEDING IN NASAL MUCOSA AND SOFT TISSUE, VIA NATURAL OR ARTIFICIAL OPENING: ICD-10-PCS | Performed by: OTOLARYNGOLOGY

## 2023-12-21 PROCEDURE — 250N000011 HC RX IP 250 OP 636: Performed by: EMERGENCY MEDICINE

## 2023-12-21 PROCEDURE — 85520 HEPARIN ASSAY: CPT | Performed by: EMERGENCY MEDICINE

## 2023-12-21 PROCEDURE — 2894A CT HEAD W/O CONTRAST: CPT | Mod: 26 | Performed by: STUDENT IN AN ORGANIZED HEALTH CARE EDUCATION/TRAINING PROGRAM

## 2023-12-21 PROCEDURE — 70498 CT ANGIOGRAPHY NECK: CPT | Mod: 26 | Performed by: STUDENT IN AN ORGANIZED HEALTH CARE EDUCATION/TRAINING PROGRAM

## 2023-12-21 PROCEDURE — 30901 CONTROL OF NOSEBLEED: CPT | Mod: 50

## 2023-12-21 PROCEDURE — 70496 CT ANGIOGRAPHY HEAD: CPT | Mod: MF

## 2023-12-21 PROCEDURE — 99222 1ST HOSP IP/OBS MODERATE 55: CPT | Performed by: PHYSICIAN ASSISTANT

## 2023-12-21 PROCEDURE — 99285 EMERGENCY DEPT VISIT HI MDM: CPT | Mod: 25

## 2023-12-21 PROCEDURE — 96360 HYDRATION IV INFUSION INIT: CPT | Mod: 59 | Performed by: EMERGENCY MEDICINE

## 2023-12-21 RX ORDER — LIDOCAINE 40 MG/G
CREAM TOPICAL
Status: DISCONTINUED | OUTPATIENT
Start: 2023-12-21 | End: 2023-12-22 | Stop reason: HOSPADM

## 2023-12-21 RX ORDER — METOPROLOL SUCCINATE 50 MG/1
50 TABLET, EXTENDED RELEASE ORAL DAILY
Status: DISCONTINUED | OUTPATIENT
Start: 2023-12-22 | End: 2023-12-22 | Stop reason: HOSPADM

## 2023-12-21 RX ORDER — OXYMETAZOLINE HYDROCHLORIDE 0.05 G/100ML
2 SPRAY NASAL 2 TIMES DAILY PRN
Status: DISCONTINUED | OUTPATIENT
Start: 2023-12-21 | End: 2023-12-22 | Stop reason: HOSPADM

## 2023-12-21 RX ORDER — POLYETHYLENE GLYCOL 3350 17 G/17G
17 POWDER, FOR SOLUTION ORAL DAILY PRN
Status: DISCONTINUED | OUTPATIENT
Start: 2023-12-21 | End: 2023-12-22 | Stop reason: HOSPADM

## 2023-12-21 RX ORDER — TRANEXAMIC ACID 100 MG/ML
500 INJECTION, SOLUTION INTRAVENOUS ONCE
Status: COMPLETED | OUTPATIENT
Start: 2023-12-21 | End: 2023-12-21

## 2023-12-21 RX ORDER — ONDANSETRON 4 MG/1
4 TABLET, ORALLY DISINTEGRATING ORAL EVERY 6 HOURS PRN
Status: DISCONTINUED | OUTPATIENT
Start: 2023-12-21 | End: 2023-12-22 | Stop reason: HOSPADM

## 2023-12-21 RX ORDER — AMOXICILLIN 250 MG
1-2 CAPSULE ORAL 2 TIMES DAILY
Status: DISCONTINUED | OUTPATIENT
Start: 2023-12-21 | End: 2023-12-22 | Stop reason: HOSPADM

## 2023-12-21 RX ORDER — CEPHALEXIN 500 MG/1
500 CAPSULE ORAL EVERY 12 HOURS SCHEDULED
Status: DISCONTINUED | OUTPATIENT
Start: 2023-12-21 | End: 2023-12-22 | Stop reason: HOSPADM

## 2023-12-21 RX ORDER — ATORVASTATIN CALCIUM 40 MG/1
40 TABLET, FILM COATED ORAL EVERY EVENING
Status: DISCONTINUED | OUTPATIENT
Start: 2023-12-22 | End: 2023-12-22 | Stop reason: HOSPADM

## 2023-12-21 RX ORDER — ACETAMINOPHEN 325 MG/1
650 TABLET ORAL EVERY 4 HOURS PRN
Status: DISCONTINUED | OUTPATIENT
Start: 2023-12-21 | End: 2023-12-22 | Stop reason: HOSPADM

## 2023-12-21 RX ORDER — ONDANSETRON 2 MG/ML
4 INJECTION INTRAMUSCULAR; INTRAVENOUS EVERY 6 HOURS PRN
Status: DISCONTINUED | OUTPATIENT
Start: 2023-12-21 | End: 2023-12-22 | Stop reason: HOSPADM

## 2023-12-21 RX ORDER — NICOTINE POLACRILEX 4 MG
15-30 LOZENGE BUCCAL
Status: DISCONTINUED | OUTPATIENT
Start: 2023-12-21 | End: 2023-12-22 | Stop reason: HOSPADM

## 2023-12-21 RX ORDER — METHOCARBAMOL 500 MG/1
500 TABLET, FILM COATED ORAL 4 TIMES DAILY PRN
Status: DISCONTINUED | OUTPATIENT
Start: 2023-12-21 | End: 2023-12-22 | Stop reason: HOSPADM

## 2023-12-21 RX ORDER — LIDOCAINE HYDROCHLORIDE AND EPINEPHRINE 10; 10 MG/ML; UG/ML
1 INJECTION, SOLUTION INFILTRATION; PERINEURAL ONCE
Status: DISCONTINUED | OUTPATIENT
Start: 2023-12-21 | End: 2023-12-21

## 2023-12-21 RX ORDER — FERROUS SULFATE 325(65) MG
325 TABLET ORAL
Status: DISCONTINUED | OUTPATIENT
Start: 2023-12-22 | End: 2023-12-22 | Stop reason: HOSPADM

## 2023-12-21 RX ORDER — OXYMETAZOLINE HYDROCHLORIDE 0.05 G/100ML
2 SPRAY NASAL ONCE
Status: COMPLETED | OUTPATIENT
Start: 2023-12-21 | End: 2023-12-21

## 2023-12-21 RX ORDER — DEXTROSE MONOHYDRATE 25 G/50ML
25-50 INJECTION, SOLUTION INTRAVENOUS
Status: DISCONTINUED | OUTPATIENT
Start: 2023-12-21 | End: 2023-12-22 | Stop reason: HOSPADM

## 2023-12-21 RX ORDER — ACETAMINOPHEN 650 MG/1
650 SUPPOSITORY RECTAL EVERY 4 HOURS PRN
Status: DISCONTINUED | OUTPATIENT
Start: 2023-12-21 | End: 2023-12-22 | Stop reason: HOSPADM

## 2023-12-21 RX ORDER — IOPAMIDOL 755 MG/ML
67 INJECTION, SOLUTION INTRAVASCULAR ONCE
Status: COMPLETED | OUTPATIENT
Start: 2023-12-21 | End: 2023-12-21

## 2023-12-21 RX ADMIN — OXYMETAZOLINE HYDROCHLORIDE 2 SPRAY: 0.05 SPRAY NASAL at 11:41

## 2023-12-21 RX ADMIN — SODIUM CHLORIDE 1000 ML: 9 INJECTION, SOLUTION INTRAVENOUS at 14:25

## 2023-12-21 RX ADMIN — CEPHALEXIN 500 MG: 500 CAPSULE ORAL at 23:44

## 2023-12-21 RX ADMIN — ACETAMINOPHEN 650 MG: 325 TABLET, FILM COATED ORAL at 19:15

## 2023-12-21 RX ADMIN — IOPAMIDOL 67 ML: 755 INJECTION, SOLUTION INTRAVENOUS at 14:43

## 2023-12-21 RX ADMIN — SODIUM CHLORIDE 1000 ML: 9 INJECTION, SOLUTION INTRAVENOUS at 12:47

## 2023-12-21 RX ADMIN — INSULIN ASPART 1 UNITS: 100 INJECTION, SOLUTION INTRAVENOUS; SUBCUTANEOUS at 22:23

## 2023-12-21 RX ADMIN — TRANEXAMIC ACID 1 G: 100 INJECTION INTRAVENOUS at 11:41

## 2023-12-21 RX ADMIN — TRANEXAMIC ACID 500 MG: 100 INJECTION INTRAVENOUS at 10:50

## 2023-12-21 ASSESSMENT — ACTIVITIES OF DAILY LIVING (ADL)
ADLS_ACUITY_SCORE: 35

## 2023-12-21 NOTE — H&P
[facility]    History and Physical: Trauma Service       Date of Admission:  12/21/2023    Time of Admission/Consult Request (page/call): 1545    Time of my evaluation: 1550  Consulting services:  OMFS    Assessment   Trauma mechanism: Syncopal Fall  Time/date of injury:12/15/23  Known Injuries:  Active bleeding from small branches off of the right sphenopalatine artery  Original Injuries  Nasal Fracture   LeForts I Fracture       Neuro/Pain/Psych:  # Acute on chronic pain   - Prn: Tylenol, Robaxin    ENT:  # LeForts I Fracture   # Active bleeding from small branches off of the right sphenopalatine artery  - CTA: Contrast extravasation/evidence of active bleeding from small branches off of the right sphenopalatine artery, originating at the entrance into the posterior lateral right ethmoidal sinus, detailed above. Grossly stable LeFort fractures without new facial fracture identified. Blood and possible superimposed mucus within the paranasal sinuses, notably complete opacification of the right maxillary and ethmoidal sinuses.  - Keep upright as much as able to help with swelling  - Nasal precautions: no nose blowing, sneeze with mouth open, no heavy listing or straws  - OMFS planning OR, per discussion they discussed patient with ENT for packing     Pulmonary:  - Supplemental oxygen to keep saturation above 92 %.  - Incentive spirometer while awake     Cardiovascular:    # Hypertension   # HLD  # Afib  # Recent syncopal episode resulting in injury, requiring hospitalization. Zoya 12/15-12/17/23    - Monitor hemodynamic status.   - continue PTA: atorvastatin, metoprolol    GI/Nutrition:    - npo for surgery     Renal/ Fluids/Electrolytes:  - Creatinine: 0.86  - electrolyte replacement protocol in place.     Endocrine:  # Diabetes Mellitus II  - Holding PTA medications: Metformin, glipizide   - NPO Medium Sliding scale for glucose management. Goal to keep BG < 180  for optimal wound healing       Infectious disease:   # COVID+, 12/15/23  - Completed Paxlovid dose, steroids deferred       Hematology:    # Acute blood loss anemia   - Hgb 11.9, stable. Previously 12/17/23: 12.1, Continue to monitor and trend.   - Threshold for transfusion if hgb <7.0 or signs/symptoms of hypoperfusion.       # PE,   - Per chart review, started on  Lovenox 1 mg/kg body weight twice daily, transition to Eliquis on 12/17/23  - Hematology consulted    Musculoskeletal:  # Weakness and deconditioning of chronic illness   - Physical and occupational therapy consults.    Skin:  # Laceration to lip on initial injry  - dilgent cares to prevent skin breakdown and wound formation.      Code status: Full Code    General Cares:  GI Prophylaxis: NA  DVT Prophylaxis: pcd  Date of last stool/Bowel Regimen:in place      ETOH: This patient was asked if in the last 3-6 months there has been a time when he had  5 or more drinks in a single day/outing.. Patient answer to the screening question was in the negative. No intervention needed.  Primary Care Physician   Bryan Patrick      Plan   Admit to 6A  Follow-up with OMFS for post-op plan  Hematology will see patient tomorrow       Silvia Rosario PA-C  Primary team: Trauma Services   Job code pager [phone] (24 hours a day)  Use [employer] to text page (not text page compatible with [email or web address]).    Dial * * * [phone] then  [phone], wait for prompt and then enter call back number.   Do NOT call numbers listed to right in Treatment Team section.       Chief Complaint   Continued bleeding, LeForts I    History is obtained from the patient, electronic health record, and emergency department physician    History of Present Illness   Shola Lemus is a 78 year old male who presented [facility] the ED at [facility] d/t continued epistaxis. Patient was transferred here per OMFS for surgical intervention. Admitted to the Trauma service for management.         Past Medical History    I have  reviewed this patient's medical history and updated it with pertinent information if needed.   Past Medical History:   Diagnosis Date    Concussion 20 years ago    Diabetes mellitus (H)     Hypercholesteremia     Hypertension        Past Surgical History   I have reviewed this patient's surgical history and updated it with pertinent information if needed.  Past Surgical History:   Procedure Laterality Date    HEAD & NECK SURGERY      tonsillectomy    HERNIA REPAIR      bilat     Prior to Admission Medications   Prior to Admission Medications   Prescriptions Last Dose Informant Patient Reported? Taking?   Apixaban Starter Pack (ELIQUIS DVT/PE STARTER PACK) 5 MG TBPK   No No   Sig: Take 10 mg by mouth 2 times daily for 7 days, THEN 5 mg 2 times daily for 23 days.   acetaminophen (TYLENOL) 325 MG tablet   Yes No   Sig: Take 2 tablets (650 mg) by mouth every 4 hours as needed for mild pain or other (and adjunct with moderate or severe pain or per patient request)   aspirin 81 MG EC tablet   Yes No   Sig: Take 81 mg by mouth daily   atorvastatin (LIPITOR) 40 MG tablet   Yes No   Sig: Take 1 tablet (40 mg) by mouth every evening   ferrous sulfate (FEROSUL) 325 (65 Fe) MG tablet   No No   Sig: Take 1 tablet (325 mg) by mouth daily (with breakfast)   glipiZIDE (GLUCOTROL) 10 MG tablet   Yes No   Sig: Take 10 mg by mouth 2 times daily (before meals)   insulin glargine (LANTUS PEN) 100 UNIT/ML pen   Yes No   Sig: Inject 65 Units Subcutaneous 2 times daily   metFORMIN (GLUCOPHAGE) 500 MG tablet   Yes No   Sig: Take 1,000 mg by mouth 2 times daily (with meals)   metoprolol succinate ER (TOPROL XL) 50 MG 24 hr tablet   Yes No   Sig: Take 50 mg by mouth daily   nirmatrelvir and ritonavir (PAXLOVID) 300 mg/100 mg therapy pack   No No   Sig: Take 3 tablets by mouth 2 times daily for 4 days . Finish remainder of Paxlovid therapy pack that was started in the hospital.  Follow instructions on that pack.      Facility-Administered  Medications: None     Allergies   No Known Allergies    Social History   Social History     Socioeconomic History    Marital status:      Spouse name: Not on file    Number of children: Not on file    Years of education: Not on file    Highest education level: Not on file   Occupational History    Not on file   Tobacco Use    Smoking status: Never    Smokeless tobacco: Never   Substance and Sexual Activity    Alcohol use: Yes     Comment: one drink every 2 weeks    Drug use: No    Sexual activity: Not on file   Other Topics Concern    Not on file   Social History Narrative    Not on file     Social Determinants of Health     Financial Resource Strain: Not on file   Food Insecurity: Not on file   Transportation Needs: Not on file   Physical Activity: Not on file   Stress: Not on file   Social Connections: Not on file   Interpersonal Safety: Not on file   Housing Stability: Not on file       Family History   Family history reviewed with patient and is noncontributory.    Review of Systems   CONSTITUTIONAL: No fever, chills, sweats, fatigue  EYES: no visual blurring, no double vision or visual loss  ENT: nose bleed,fractures   RESPIRATORY: no shortness of breath, no cough, no sputum   CARDIOVASCULAR: no palpitations, no chest  pain, no exertional chest pain or pressure  GASTROINTESTINAL: no nausea or vomiting, or abd pain  GENITOURINARY: no dysuria, no frequency or hesitancy, no hematuria  MUSCULOSKELETAL: no weakness, no redness, no swelling, no joint pain,   SKIN: no rashes, ecchymoses, abrasions or lacerations  NEUROLOGIC: no numbness or tingling of hands, no numbness or tingling  of feet, no syncope, no tremors or weakness  PSYCHIATRIC: no sleep disturbances, no anxiety or depression    Physical Exam   Temp: 98.1  F (36.7  C) Temp src: Oral BP: 133/65 Pulse: 71   Resp: 16 SpO2: 100 % O2 Device: None (Room air)    Vital Signs with Ranges  Temp:  [97  F (36.1  C)-98.1  F (36.7  C)] 98.1  F (36.7  C)  Pulse:   [51-75] 71  Resp:  [10-37] 16  BP: ()/() 133/65  SpO2:  [87 %-100 %] 100 % 0 lbs 0 oz    Primary Survey:   Airway: patient talking  Breathing: symmetric respiratory effort bilaterally  Circulation: central pulses present and peripheral pulses present  Disability: Pupils - left 4 mm and brisk, right 4 mm and brisk     [place] Coma Scale - Total 15/15  Eye Response (E): 4  4= spontaneous,  3= to verbal/voice, 2=  to pain, 1= No response   Verbal Response (V): 5   5= Orientated, converses,  4= Confused, converses, 3= Inappropriate words,  2= Incomprehensible sounds,  1=No response   Motor Response (M): 6   6= Obeys commands, 5= Localizes to pain, 4= Withdrawal to pain, 3=Fexion to pain, 2= Extension to pain, 1= No response    Secondary Survey:  General: alert, oriented to person, place, time  Head: atraumatic, normocephalic,  Eyes: periorbital edema, yellowing ecchymosis, PERRLA, pupils 3mm, EOMI, corneas and conjunctivae clear  Nose: nasal septum edema,  Rhino rocket in right nare  Mouth/Throat: no exudates or erythema,  no dental tenderness or malocclusions, no tongue lacerations  Neck: Trachea midline. No midline posterior tenderness, full AROM without pain or tenderness   Chest/Pulmonary: normal respiratory rate and rhythm,  bilateral clear breath sounds, no wheezes, rales or rhonchi, no chest wall tenderness or deformities,   Cardiovascular: S1, S2,  normal and regular rate and rhythm, no murmurs  Abdomen: soft, non-tender, no guarding, no rebound tenderness and no tenderness to palpation  Musculoskel/Extremities: normal extremities, full AROM of major joints without tenderness, edema, erythema, ecchymosis, or abrasions.   Hand: no gross deformities of hands or fingers. Full AROM of hand and fingers in flexion and extension.  strength equal and symmetric.   Skin: warm and dry.   Neuro: PERRLA, alert, oriented x 3.  No focal deficits. Strength 4/5 x 4 extremities.  Sensation intact.  Psychiatric:  affect/mood normal, cooperative, normal judgement/insight and memory intact  # Pain Assessment:      12/21/2023     9:59 AM   Current Pain Score   Patient currently in pain? denies   - Shola is experiencing pain due to fractures, headache. Pain management was discussed and the plan was created in a collaborative fashion.  Shola's response to the current recommendations: engaged  - Please see the plan for pain management as documented above    Data   Results for orders placed or performed during the hospital encounter of 12/21/23 (from the past 24 hour(s))   [place] Draw    Narrative    The following orders were created for panel order [place] Draw.  Procedure                               Abnormality         Status                     ---------                               -----------         ------                     Extra Blue Top Tube[[id number]]                              Final result               Extra Red Top Tube[[id number]]                                                          Extra Green Top (Lithium...[[id number]]                      Final result               Extra Purple Top Tube[[id number]]                            Final result                 Please view results for these tests on the individual orders.   Extra Blue Top Tube   Result Value Ref Range    Hold Specimen JIC    Extra Green Top (Lithium Heparin) Tube   Result Value Ref Range    Hold Specimen JIC    Extra Purple Top Tube   Result Value Ref Range    Hold Specimen JIC    Heparin Unfractionated Anti Xa Level   Result Value Ref Range    Anti Xa Unfractionated Heparin >1.10 (HH) For Reference Range, See Comment IU/mL    Narrative    Therapeutic Range: UFH: 0.25-0.50 IU/mL for low intensity dosing,  0.30-0.70 IU/mL for high intensity dosing DVT and PE.  This test is not validated for other direct factor X inhibitors (e.g. rivaroxaban, apixaban, edoxaban, betrixaban, fondaparinux) and should not be used for monitoring of other  medications.   INR   Result Value Ref Range    INR 1.19 (H) 0.85 - 1.15   Partial thromboplastin time   Result Value Ref Range    aPTT 30 22 - 38 Seconds   CT Head w/o Contrast   Result Value Ref Range    Radiologist flags Right sphenopalatine artery branch active bleed (Urgent)     Narrative    CTA HEAD NECK W CONTRAST, CT HEAD W/O CONTRAST 12/21/2023 3:01 PM    Head CT without contrast  CT angiogram of the neck   CT angiogram of the base of the brain with contrast  Reconstruction by the Radiologist on the 3D workstation    Provided History:  Previous traumatic facial injuries Le Fort  fractures.  Persistent right-sided epistaxis starting today OMFS  recommending CT scan to assess for location of bleed  ICD-10:    Comparison: CT head 12/15/2023.    Technique:  HEAD CT:  Using multidetector thin collimation helical acquisition  technique, axial, coronal and sagittal CT images from the skull base  to the vertex were obtained without intravenous contrast.   HEAD and NECK CTA: During rapid bolus intravenous injection of  nonionic contrast material, axial images were obtained using thin  collimation multidetector helical technique from the base of the skull  through the [place] of [place]. This CT angiogram data was reconstructed  at thin intervals with mild overlap. Images were sent to the [employer]  workstation, and 3D reconstructions were obtained. The axial source  images, multiplanar reformations, 3D reconstructions in both maximum  intensity projection display and volume rendered models were reviewed,  with reconstructions performed by the technologist and the  radiologist.    Contrast: 67cc of isovue 370    Findings:  Head CT: No intracranial hemorrhage, mass effect, midline shift,  hydrocephalus, acute loss of gray-white matter differentiation  throughout the cerebral hemispheres, or evidence of acute infarct.  Moderate diffuse cerebral parenchymal volume loss. Moderate  periventricular scattered white matter patchy  low attenuation likely  related to chronic some vessel ischemic disease. Multiple mildly  displaced chronic LeFort facial fractures. Complete opacification of  the right maxillary sinus, with moderate thickening of mild  superimposed debris of the left maxillary sinus, and frothy  debris/fluid within the nasopharynx. Right nasal catheter in place.  Additional debris within the frontal sinuses. The opacification within  the sinuses is likely mixed mucus and/or blood products with mildly  hyperdense components. No definite facial fractures. No significant  appreciable periapical dental disease. Grossly normal orbits. The  mastoid air cells are clear.    CTA studies:  There is slight linear streaky hyperdensity and punctate ill-defined  contrast blush off a small branch of the right distal sphenopalatine  artery, as a branch enters the right posterior ethmoidal sinus (series  4 image [id number]). The punctate blush of contrast is specifically on  image 335. Additional questionable small blush extravasation foci  originating from likely small branches of the very distal right  sphenopalatine artery.    Head CTA demonstrates no aneurysm or significant stenosis of the major  intracranial arteries. Left fetal PCA. Mild bilateral ICA carotid  siphon calcification.    Neck CTA demonstrates no stenosis of the major cervical arteries.  Normal great vessel arch origin anatomy, with mild-to-moderate  scattered atherosclerotic calcification. The normal distal right  internal carotid artery measures 5 mm. The normal distal left internal  carotid artery measures 5 mm. Moderate calcified plaque at the left  carotid bifurcation without significant luminal narrowing. Mild  calcified plaque at the right carotid bifurcation without significant  luminal narrowing.     No mass is noted within the visualized portions of the cervical soft  tissues or lung apices. Numerous presumed reactive bilateral  perijugular and lower cervical, and  superior mediastinal lymph nodes.      Impression    Impression:    1. Contrast extravasation/evidence of active bleeding from small  branches off of the right sphenopalatine artery, originating at the  entrance into the posterior lateral right ethmoidal sinus, detailed  above. Grossly stable LeFort fractures without new facial fracture  identified. Blood and possible superimposed mucus within the paranasal  sinuses, notably complete opacification of the right maxillary and  ethmoidal sinuses.    2. Otherwise unremarkable CTA of the head and neck. Left greater than  right mild-to-moderate atherosclerotic plaque at the carotid bulbs  without significant luminal narrowing.    3. Noncontrast head CT demonstrates no acute intracranial pathology.    4. Reactive bilateral perijugular, lower cervical, and superior  mediastinal lymph nodes.    [Urgent Result: Right sphenopalatine artery branch active bleed]    Finding was identified on 12/21/2023 3:04 PM.     Dr. Munoz was contacted by Dr. Foley at 12/21/2023 3:49 PM and  verbalized understanding of the urgent finding.     I have personally reviewed the examination and initial interpretation  and I agree with the findings.    ESTRADA STAPLES MD         SYSTEM ID:  [id number]   CTA Head Neck with Contrast   Result Value Ref Range    Radiologist flags Right sphenopalatine artery branch active bleed (Urgent)     Narrative    CTA HEAD NECK W CONTRAST, CT HEAD W/O CONTRAST 12/21/2023 3:01 PM    Head CT without contrast  CT angiogram of the neck   CT angiogram of the base of the brain with contrast  Reconstruction by the Radiologist on the 3D workstation    Provided History:  Previous traumatic facial injuries Le Fort  fractures.  Persistent right-sided epistaxis starting today OMFS  recommending CT scan to assess for location of bleed  ICD-10:    Comparison: CT head 12/15/2023.    Technique:  HEAD CT:  Using multidetector thin collimation helical acquisition  technique, axial,  coronal and sagittal CT images from the skull base  to the vertex were obtained without intravenous contrast.   HEAD and NECK CTA: During rapid bolus intravenous injection of  nonionic contrast material, axial images were obtained using thin  collimation multidetector helical technique from the base of the skull  through the [place] of [place]. This CT angiogram data was reconstructed  at thin intervals with mild overlap. Images were sent to the [employer]  workstation, and 3D reconstructions were obtained. The axial source  images, multiplanar reformations, 3D reconstructions in both maximum  intensity projection display and volume rendered models were reviewed,  with reconstructions performed by the technologist and the  radiologist.    Contrast: 67cc of isovue 370    Findings:  Head CT: No intracranial hemorrhage, mass effect, midline shift,  hydrocephalus, acute loss of gray-white matter differentiation  throughout the cerebral hemispheres, or evidence of acute infarct.  Moderate diffuse cerebral parenchymal volume loss. Moderate  periventricular scattered white matter patchy low attenuation likely  related to chronic some vessel ischemic disease. Multiple mildly  displaced chronic LeFort facial fractures. Complete opacification of  the right maxillary sinus, with moderate thickening of mild  superimposed debris of the left maxillary sinus, and frothy  debris/fluid within the nasopharynx. Right nasal catheter in place.  Additional debris within the frontal sinuses. The opacification within  the sinuses is likely mixed mucus and/or blood products with mildly  hyperdense components. No definite facial fractures. No significant  appreciable periapical dental disease. Grossly normal orbits. The  mastoid air cells are clear.    CTA studies:  There is slight linear streaky hyperdensity and punctate ill-defined  contrast blush off a small branch of the right distal sphenopalatine  artery, as a branch enters the right  posterior ethmoidal sinus (series  4 image [id number]). The punctate blush of contrast is specifically on  image 335. Additional questionable small blush extravasation foci  originating from likely small branches of the very distal right  sphenopalatine artery.    Head CTA demonstrates no aneurysm or significant stenosis of the major  intracranial arteries. Left fetal PCA. Mild bilateral ICA carotid  siphon calcification.    Neck CTA demonstrates no stenosis of the major cervical arteries.  Normal great vessel arch origin anatomy, with mild-to-moderate  scattered atherosclerotic calcification. The normal distal right  internal carotid artery measures 5 mm. The normal distal left internal  carotid artery measures 5 mm. Moderate calcified plaque at the left  carotid bifurcation without significant luminal narrowing. Mild  calcified plaque at the right carotid bifurcation without significant  luminal narrowing.     No mass is noted within the visualized portions of the cervical soft  tissues or lung apices. Numerous presumed reactive bilateral  perijugular and lower cervical, and superior mediastinal lymph nodes.      Impression    Impression:    1. Contrast extravasation/evidence of active bleeding from small  branches off of the right sphenopalatine artery, originating at the  entrance into the posterior lateral right ethmoidal sinus, detailed  above. Grossly stable LeFort fractures without new facial fracture  identified. Blood and possible superimposed mucus within the paranasal  sinuses, notably complete opacification of the right maxillary and  ethmoidal sinuses.    2. Otherwise unremarkable CTA of the head and neck. Left greater than  right mild-to-moderate atherosclerotic plaque at the carotid bulbs  without significant luminal narrowing.    3. Noncontrast head CT demonstrates no acute intracranial pathology.    4. Reactive bilateral perijugular, lower cervical, and superior  mediastinal lymph nodes.    [Urgent  Result: Right sphenopalatine artery branch active bleed]    Finding was identified on 12/21/2023 3:04 PM.     Dr. Munoz was contacted by Dr. Foley at 12/21/2023 3:49 PM and  verbalized understanding of the urgent finding.     I have personally reviewed the examination and initial interpretation  and I agree with the findings.    ESTRADA STAPLES MD         SYSTEM ID:  [id number]   Glucose by meter   Result Value Ref Range    GLUCOSE BY METER POCT 126 (H) 70 - 99 mg/dL       Studies:  CTA Head Neck with Contrast    (Results Pending)   CT Head w/o Contrast    (Results Pending)

## 2023-12-21 NOTE — ED TRIAGE NOTES
Pt fell onto face last Friday and seen in ED. Pt has bruising to bilateral eyes and nose that is at different stages of healing. Pt also has swelling to nose, Pt Dx with a fractures to nose last Friday. Today Pt c/o bleeding from bilateral nostrils that has been constant over the past few hours. Pt reports intermittent bleeding over the past few days after cough or sneezes. Pt is on Eliquis.Bleeding controlled with pressure.      Triage Assessment (Adult)       Row Name 12/21/23 0959          Triage Assessment    Airway WDL WDL        Respiratory WDL    Respiratory WDL WDL        Cardiac WDL    Cardiac WDL WDL        Cognitive/Neuro/Behavioral WDL    Cognitive/Neuro/Behavioral WDL WDL

## 2023-12-21 NOTE — CONSULTS
ORAL & MAXILLOFACIAL SURGERY   CONSULT  Shola Lemus,  MRN: [id number],  : 1945    ASSESSMENT   79 yo male with DM2, PE on eliquis with right lefort 1 and incomplete left lefort 1 fractures with uncontrolled epistaxis from the right sphenopalatine artery s/p fall on 12/15/23    PLAN  - Discussed case with ENT who will evaluate pt  - No acute surgical intervention for facial fractures  - Sinus precautions (Below)  - recommend admit to trauma  - appreciate ENT recs    Please follow SINUS PRECAUTIONS which are:   - No nose blowing   - No closed mouth sneezing  - No straws   - No spitting  - No heavy lifting or contact sports  - No bending over (upside down)    Please contact the OMFS resident on-call with questions or concerns.    Discussed with chief resident and staff.    Beverly Rosenthal DMD  Oral & Maxillofacial Surgery, Intern  ____________________________________________    HPI  Shola Lemus is a 78 year old male with a past history of hypertension, hyperlipidemia, type 2 diabetes, on Eliquis for a recently diagnosed PE who was admitted to [facility] via a transfer from [facility]. The patient had a fall on his face on 12/15/23 which he stated was caused by a recent coughing spell that caused him to become unconscious prior to the fall.. The patient relayed that he was subsequently diagnosed with COVID and believed that may have been the culprit. The patient was admitted to [facility] with LeFort I fracture and closed fractures of nasal bones and was discharged on 23 and returned to [facility] today 23 after having uncontrollable epistaxis since 0700 hrs. Patient last ate yesterday 23 at 1900 hrs.   While at [facility] the ED Dr. Daniel administered TXA and packed the right nostril with a rhino rocket. Nevertheless bleeding persisted. Patient last took the eliquis yesterday at 0700 hrs.    HISTORY  Past Medical History:   Past Medical  History:   Diagnosis Date    Concussion 20 years ago    Diabetes mellitus (H)     Hypercholesteremia     Hypertension      Past Surgical History:   Past Surgical History:   Procedure Laterality Date    HEAD & NECK SURGERY      tonsillectomy    HERNIA REPAIR      bilat     Medications:   [COMPLETED] oxymetazoline (AFRIN) 0.05 % spray 2 spray  [COMPLETED] sodium chloride 0.9% BOLUS 1,000 mL  [COMPLETED] tranexamic acid (CYKLOKAPRON) bolus 1 g vial attach to NaCl 50 or 100 mL bag ADULT  [COMPLETED] tranexamic acid (CYKLOKAPRON) spray 500 mg    acetaminophen (TYLENOL) 325 MG tablet, Take 2 tablets (650 mg) by mouth every 4 hours as needed for mild pain or other (and adjunct with moderate or severe pain or per patient request)  Apixaban Starter Pack (ELIQUIS DVT/PE STARTER PACK) 5 MG TBPK, Take 10 mg by mouth 2 times daily for 7 days, THEN 5 mg 2 times daily for 23 days.  aspirin 81 MG EC tablet, Take 81 mg by mouth daily  atorvastatin (LIPITOR) 40 MG tablet, Take 1 tablet (40 mg) by mouth every evening  [START ON 12/22/2023] ferrous sulfate (FEROSUL) 325 (65 Fe) MG tablet, Take 1 tablet (325 mg) by mouth daily (with breakfast)  glipiZIDE (GLUCOTROL) 10 MG tablet, Take 10 mg by mouth 2 times daily (before meals)  insulin glargine (LANTUS PEN) 100 UNIT/ML pen, Inject 65 Units Subcutaneous 2 times daily  metFORMIN (GLUCOPHAGE) 500 MG tablet, Take 1,000 mg by mouth 2 times daily (with meals)  metoprolol succinate ER (TOPROL XL) 50 MG 24 hr tablet, Take 50 mg by mouth daily  nirmatrelvir and ritonavir (PAXLOVID) 300 mg/100 mg therapy pack, Take 3 tablets by mouth 2 times daily for 4 days . Finish remainder of Paxlovid therapy pack that was started in the hospital.  Follow instructions on that pack.        [START ON 12/22/2023] atorvastatin  40 mg Oral QPM    [START ON 12/22/2023] ferrous sulfate  325 mg Oral Daily with breakfast    insulin aspart  1-6 Units Subcutaneous Q4H    [START ON 12/22/2023] metoprolol succinate ER   50 mg Oral Daily    senna-docusate  1-2 tablet Oral BID    sodium chloride (PF)  3 mL Intracatheter Q8H     Allergies: No Known Allergies  Social History:   Social History     Socioeconomic History    Marital status:      Spouse name: Not on file    Number of children: Not on file    Years of education: Not on file    Highest education level: Not on file   Occupational History    Not on file   Tobacco Use    Smoking status: Never    Smokeless tobacco: Never   Substance and Sexual Activity    Alcohol use: Yes     Comment: one drink every 2 weeks    Drug use: No    Sexual activity: Not on file   Other Topics Concern    Not on file   Social History Narrative    Not on file     Social Determinants of Health     Financial Resource Strain: Not on file   Food Insecurity: Not on file   Transportation Needs: Not on file   Physical Activity: Not on file   Stress: Not on file   Social Connections: Not on file   Interpersonal Safety: Not on file   Housing Stability: Not on file       REVIEW OF SYSTEMS  10 point ROS reviewed and negative aside from listed in HPI    PHYSICAL EXAM  Vital Signs:   Vitals:    12/21/23 1410   BP: 133/65   Pulse: 71   Resp: 16   Temp: 98.1  F (36.7  C)   TempSrc: Oral   SpO2: 100%       HEENT: NC, There are signs of external trauma, two horizontal lacerations about 4 mm along the superior bridge of the nose.       Ears: External ears are normal with no hearing loss.      Eyes: Bilateral periorbital ecchymosis. Pupils equal round and reactive to light, Extraocular eye movement intact, no subconjunctival hemorrhage, hyphema, chemosis.       Nose: Rhino rocket placed in the right nasal. Deviation of the left nasal septum. Dry blood inside of the left nasal      Mouth: The buccal vestibules are pink with a laceration of the inside of the upper lip between 8 and 9. The laceration is about 6 mm angled horizontally..  The dentition is mostly intact, with good oral hygiene. Floor of mouth soft  nontender, nondistended, no lateral pharyngeal swelling. Maximum interincisal opening is X~40. TMJ is tender bilaterally. Blood and blood clot can be seen draining down the patient's oropharynx..              REVIEW OF LABORATORY DATA  Most Recent 3 CBC's:  Recent Labs   Lab Test 12/21/23  1231 12/21/23  1009 12/17/23  1058 12/16/23  0830 12/15/23  1734   WBC  --  7.1  --  6.0 10.1   HGB 11.9* 12.2* 12.1* 12.6* 13.0*   MCV  --  93  --  92 92   PLT  --  244  --  210 230      Most Recent 3 BMP's:  Recent Labs   Lab Test 12/21/23  1324 12/21/23  1009 12/17/23  1441 12/17/23  0829 12/17/23  0735 12/16/23  0857 12/16/23  0830 12/15/23  2334 12/15/23  1734   NA  --  135  --   --   --   --  137  --  131*   POTASSIUM  --  4.6  --   --   --   --  4.1  --  4.0   CHLORIDE  --  100  --   --   --   --  102  --  96*   CO2  --  27  --   --   --   --  27  --  25   BUN  --  13.1  --   --   --   --  11.4  --  11.7   CR  --  0.86  --   --  0.86  --  0.84  --  0.95   ANIONGAP  --  8  --   --   --   --  8  --  10   RHONDA  --  8.4*  --   --   --   --  8.2*  --  8.5*   * 125* 199*   < >  --    < > 168*   < > 135*    < > = values in this interval not displayed.     Most Recent 2 LFT's:  Recent Labs   Lab Test 12/16/23  0830 12/15/23  1734   AST 26 31   ALT 24 27   ALKPHOS 76 84   BILITOTAL 0.4 0.8     Most Recent INR's and Anticoagulation Dosing History:  Anticoagulation Dose History          Latest Ref Rng & Units 12/21/2023   Recent Dosing and Labs   INR 0.85 - 1.15 1.19      Most Recent 3 Troponin's:No lab results found.  Most Recent Cholesterol Panel:No lab results found.  Most Recent 6 Bacteria Isolates From Any Culture (See EPIC Reports for Culture Details):No lab results found.  Most Recent TSH, T4 and A1c Labs:  Recent Labs   Lab Test 12/15/23  1734   A1C 7.9*       IMAGING RESULTS (Include outside hospital results)       CTA HEAD NECK W CONTRAST, CT HEAD W/O CONTRAST 12/21/2023 3:01 PM     Head CT without contrast  CT  angiogram of the neck   CT angiogram of the base of the brain with contrast  Reconstruction by the Radiologist on the 3D workstation     Provided History:  Previous traumatic facial injuries Le Fort  fractures.  Persistent right-sided epistaxis starting today OMFS  recommending CT scan to assess for location of bleed  ICD-10:     Comparison: CT head 12/15/2023.     Technique:  HEAD CT:  Using multidetector thin collimation helical acquisition  technique, axial, coronal and sagittal CT images from the skull base  to the vertex were obtained without intravenous contrast.   HEAD and NECK CTA: During rapid bolus intravenous injection of  nonionic contrast material, axial images were obtained using thin  collimation multidetector helical technique from the base of the skull  through the [place]. This CT angiogram data was reconstructed  at thin intervals with mild overlap. Images were sent to the [place] [employer]  workstation, and 3D reconstructions were obtained. The axial source  images, multiplanar reformations, 3D reconstructions in both maximum  intensity projection display and volume rendered models were reviewed,  with reconstructions performed by the technologist and the  radiologist.     Contrast: 67cc of isovue 370     Findings:  Head CT: No intracranial hemorrhage, mass effect, midline shift,  hydrocephalus, acute loss of gray-white matter differentiation  throughout the cerebral hemispheres, or evidence of acute infarct.  Moderate diffuse cerebral parenchymal volume loss. Moderate  periventricular scattered white matter patchy low attenuation likely  related to chronic some vessel ischemic disease. Multiple mildly  displaced chronic LeFort facial fractures. Complete opacification of  the right maxillary sinus, with moderate thickening of mild  superimposed debris of the left maxillary sinus, and frothy  debris/fluid within the nasopharynx. Right nasal catheter in place.  Additional debris within the frontal  sinuses. The opacification within  the sinuses is likely mixed mucus and/or blood products with mildly  hyperdense components. No definite facial fractures. No significant  appreciable periapical dental disease. Grossly normal orbits. The  mastoid air cells are clear.     CTA studies:  There is slight linear streaky hyperdensity and punctate ill-defined  contrast blush off a small branch of the right distal sphenopalatine  artery, as a branch enters the right posterior ethmoidal sinus (series  4 image [id number]). The punctate blush of contrast is specifically on  image 335. Additional questionable small blush extravasation foci  originating from likely small branches of the very distal right  sphenopalatine artery.     Head CTA demonstrates no aneurysm or significant stenosis of the major  intracranial arteries. Left fetal PCA. Mild bilateral ICA carotid  siphon calcification.     Neck CTA demonstrates no stenosis of the major cervical arteries.  Normal great vessel arch origin anatomy, with mild-to-moderate  scattered atherosclerotic calcification. The normal distal right  internal carotid artery measures 5 mm. The normal distal left internal  carotid artery measures 5 mm. Moderate calcified plaque at the left  carotid bifurcation without significant luminal narrowing. Mild  calcified plaque at the right carotid bifurcation without significant  luminal narrowing.      No mass is noted within the visualized portions of the cervical soft  tissues or lung apices. Numerous presumed reactive bilateral  perijugular and lower cervical, and superior mediastinal lymph nodes.                                                                      Impression:    1. Contrast extravasation/evidence of active bleeding from small  branches off of the right sphenopalatine artery, originating at the  entrance into the posterior lateral right ethmoidal sinus, detailed  above. Grossly stable LeFort fractures without new facial  fracture  identified. Blood and possible superimposed mucus within the paranasal  sinuses, notably complete opacification of the right maxillary and  ethmoidal sinuses.     2. Otherwise unremarkable CTA of the head and neck. Left greater than  right mild-to-moderate atherosclerotic plaque at the carotid bulbs  without significant luminal narrowing.     3. Noncontrast head CT demonstrates no acute intracranial pathology.     4. Reactive bilateral perijugular, lower cervical, and superior  mediastinal lymph nodes.     [Urgent Result: Right sphenopalatine artery branch active bleed]     Finding was identified on 12/21/2023 3:04 PM.      Dr. Munoz was contacted by Dr. Foley at 12/21/2023 3:49 PM and  verbalized understanding of the urgent finding.      I have personally reviewed the examination and initial interpretation  and I agree with the findings.     ESTRADA STAPLES MD

## 2023-12-21 NOTE — ED NOTES
Physician updated regarding patients blood pressure.  Physician at bedside decreasing small amount of volume from nasal packing.  NS bolus initiated

## 2023-12-21 NOTE — ED TRIAGE NOTES
6 days ago pt landed on face, broken nose w/ facial fractures  Minor PE, was placed on blood thinners  Balloon was placed in R nostril earlier, has stopped the bleeding (did have vasovagal episode after, but VSS now)  Pt feels trickle of blood down back of throat. Needs to see OMFS while here, was transferred from [facility]  Last Hgb 11.9  Covid positive on 12/15 (symptoms started 10 days ago)

## 2023-12-21 NOTE — ED PROVIDER NOTES
"  History     Chief Complaint:  Epistaxis       HPI   Shola Lemus is a 78 year old male history of hypertension, hyperlipidemia, type 2 diabetes, on Eliquis, recent admission for syncopal episode with LeFort I fracture and closed fractures of nasal bones, presenting with epistaxis.  Patient has had intermittent mild nosebleeds but they have stopped with pressure.  This 1 would not.  Endorses bleeding from both nostrils.  Coughing and sneezing bring on these episodes of bleeding and that is what caused the epistaxis today. Last took eliquis last night.    Independent Historian:    none    Review of External Notes:  Discharge summary December 17, 2023.    Allergies:  No Known Allergies     Physical Exam   Patient Vitals for the past 24 hrs:   BP Temp Temp src Pulse Resp SpO2 Height Weight   12/21/23 1300 133/65 -- -- 64 14 93 % -- --   12/21/23 1251 133/67 -- -- 62 10 97 % -- --   12/21/23 1245 (!) 85/56 -- -- 51 14 94 % -- --   12/21/23 1230 130/62 -- -- 75 10 97 % -- --   12/21/23 1215 133/75 -- -- 72 10 96 % -- --   12/21/23 1100 (!) 146/59 -- -- 68 10 95 % -- --   12/21/23 1045 -- -- -- 67 (!) 34 96 % -- --   12/21/23 1040 -- -- -- 75 (!) 37 98 % -- --   12/21/23 1035 -- -- -- 71 (!) 31 97 % -- --   12/21/23 1034 (!) 130/108 -- -- 70 18 (!) 87 % -- --   12/21/23 1032 124/72 -- -- 74 -- -- -- --   12/21/23 0953 (!) 159/65 97  F (36.1  C) Temporal 71 18 97 % 1.905 m (6' 3\") 122.5 kg (270 lb)        Physical Exam  GENERAL: Patient anxious, but nontoxic.  HEAD: Bilateral old periorbital bruising.  NECK: No rigidity  Ears: Hemotympanum bilaterally.  Nose: Blood oozing out of bilateral nares.  Throat: Clot of blood visualized behind uvula.  Normal voice.  No tongue elevation.  No hemorrhage.  PULM: Breathing comfortably.  DERM: No rash. Skin warm and dry  EXTREMITY: Moving all extremities without difficulty.        Emergency Department Course          Laboratory: Imaging:   Labs Ordered and Resulted from Time of " ED Arrival to Time of ED Departure   BASIC METABOLIC PANEL - Abnormal       Result Value    Sodium 135      Potassium 4.6      Chloride 100      Carbon Dioxide (CO2) 27      Anion Gap 8      Urea Nitrogen 13.1      Creatinine 0.86      GFR Estimate 90      Calcium 8.4 (*)     Glucose 125 (*)    CBC WITH PLATELETS AND DIFFERENTIAL - Abnormal    WBC Count 7.1      RBC Count 4.07 (*)     Hemoglobin 12.2 (*)     Hematocrit 37.9 (*)     MCV 93      MCH 30.0      MCHC 32.2      RDW 13.9      Platelet Count 244      % Neutrophils 57      % Lymphocytes 29      % Monocytes 10      % Eosinophils 4      % Basophils 0      % Immature Granulocytes 0      NRBCs per 100 WBC 0      Absolute Neutrophils 4.0      Absolute Lymphocytes 2.0      Absolute Monocytes 0.7      Absolute Eosinophils 0.3      Absolute Basophils 0.0      Absolute Immature Granulocytes 0.0      Absolute NRBCs 0.0     HEMOGLOBIN - Abnormal    Hemoglobin 11.9 (*)    GLUCOSE BY METER - Abnormal    GLUCOSE BY METER POCT 121 (*)      No orders to display           Procedures     Epistaxis Care     Procedure: Epistaxis Care    Indication: Epistaxis    Consent: Verbal    Medication: Patient was topically medicated with lidocaine with epinephrine, Afrin, TXA.    Procedure detail: Rhino Rocket (sponge) was gently inserted.   Patient was closely monitored.    Patient Status: The patient tolerated the procedure well: Yes. There were no complications.      Emergency Department Course & Assessments:             Interventions:  Medications   sodium chloride 0.9% BOLUS 1,000 mL (1,000 mLs Intravenous $New Bag 12/21/23 1247)   tranexamic acid (CYKLOKAPRON) spray 500 mg (500 mg Both Nostrils $Given 12/21/23 1050)   oxymetazoline (AFRIN) 0.05 % spray 2 spray (2 sprays Nasal $Given 12/21/23 1141)   tranexamic acid (CYKLOKAPRON) bolus 1 g vial attach to NaCl 50 or 100 mL bag ADULT (1 g Intravenous $Given 12/21/23 1141)        Assessments, Independent Interpretation,  Consult/Discussion of ManagementTests:   [facility], working under attending Dr. Koo  Discussed with emergency medicine physician at [facility] Dr. Faith    Social Determinants of Health affecting care:  None    Disposition:  Transfer to the [facility] emergency department, to be assessed by OMFS.    Impression & Plan         Medical Decision Making:  Symptoms consistent with epistaxis.     Chronic conditions complicating -recent pulmonary embolus diagnosis on Eliquis.    DDx considered mass, fracture, posterior bleed.    Initial labs notable for hemoglobin 12.2, which is at his baseline and repeated over 2 hours later and it was 11.9.    Utilized intranasal Afrin, lidocaine with epinephrine, and roughly 300 mg topical TXA into bilateral nares and then utilized external pressure with clamp.  Patient continues to feel that the blood was dripping in the back of his throat.  There was a visible clot of tissue behind his uvula which I removed with forceps and it was roughly 10 cm x 3 cm in size.    Held off on initial packing due to recent diagnosis of LeFort I fracture and nasal bone fractures.    [place] [facility] and spoke with Dr. Rosenthal, who discussed with his attending and they do recommend a posterior nasal pack.  Therefore a right-sided posterior Rhino Rocket was placed.  I soaked it in 300 mg of topical TXA and lidocaine with epinephrine.  Unable to place a posterior Rhino Rocket in the left nare as he had a deviated septum and I promptly encountered resistance.    Patient was also given IV TXA 1 g.    Patient last took anticoagulation last night and he is on it for pulmonary embolism so do not think we should emergently reverse it.  He does not appear to be hemorrhaging on exam.  His blood pressure is stable.  His heart rate is stable.  He did have a brief episode of blood pressure in the 80s after the posterior packing  which I suspect was vasovagal mediated.  I retracted some of the air from the packing balloon and his blood pressure returned back to normal.  He was given IV fluids.    Patient continues to feel that the blood is dripping in the back of his throat although I do not see signs of hemorrhage, so I rediscussed with OMFS and they recommend we transfer patient to their facility for management.  Patient also had fullness in his ears.  There is hemotympanum bilaterally, likely secondary to blood backing up in the eustachian tubes.  I discussed with ER physician at [facility] Dr. aFith who accepted patient for transfer.    Patient transferred in stable condition.    Diagnosis:    ICD-10-CM    1. Epistaxis  R04.0            Discharge Medications:  New Prescriptions    No medications on file          12/21/2023   Franklin Daniel MD Foss, Kevin, MD  12/21/23 2482

## 2023-12-21 NOTE — ED PROVIDER NOTES
ED Provider Note  [facility]      History     Chief Complaint   Patient presents with    Epistaxis     HPI  Shola Lemus is a 78 year old male PMH of closed facial fracture, DM, PE on apixaban who presents to the ER for evaluation of epistaxis.    Patient states that last week due to his COVID, he had a coughing fit and lost consciousness. He fell face forward onto the hardwood floor and consequentially fractured several facial bones. He was also found to have a PE and was put on anticoagulation. Due to this, he is now consistently bleeding from both his nostrils. At presentation, he is not bleeding out through the nose after a balloon was placed, but he can feel it draining down his throat. He comes to [facility] today as a transfer from [facility].    Past Medical History  Past Medical History:   Diagnosis Date    Concussion 20 years ago    Diabetes mellitus (H)     Hypercholesteremia     Hypertension      Past Surgical History:   Procedure Laterality Date    HEAD & NECK SURGERY      tonsillectomy    HERNIA REPAIR      bilat     acetaminophen (TYLENOL) 325 MG tablet  Apixaban Starter Pack (ELIQUIS DVT/PE STARTER PACK) 5 MG TBPK  aspirin 81 MG EC tablet  atorvastatin (LIPITOR) 40 MG tablet  [START ON 12/22/2023] ferrous sulfate (FEROSUL) 325 (65 Fe) MG tablet  glipiZIDE (GLUCOTROL) 10 MG tablet  insulin glargine (LANTUS PEN) 100 UNIT/ML pen  metFORMIN (GLUCOPHAGE) 500 MG tablet  metoprolol succinate ER (TOPROL XL) 50 MG 24 hr tablet  nirmatrelvir and ritonavir (PAXLOVID) 300 mg/100 mg therapy pack      No Known Allergies  Family History  No family history on file.  Social History   Social History     Tobacco Use    Smoking status: Never    Smokeless tobacco: Never   Substance Use Topics    Alcohol use: Yes     Comment: one drink every 2 weeks    Drug use: No         A medically appropriate review of systems was performed with pertinent positives and negatives noted in the HPI, and all  other systems negative.    Physical Exam      Physical Exam  Vitals and nursing note reviewed.   Constitutional:       General: He is not in acute distress.     Appearance: He is well-developed.   HENT:      Head: Normocephalic.      Comments: Extensive facial contusions     Right Ear: External ear normal.      Left Ear: External ear normal.      Nose: Nose normal.      Mouth/Throat:      Comments: There appears to be a small amount of blood draining down patient's oropharynx  Eyes:      Extraocular Movements: Extraocular movements intact.      Conjunctiva/sclera: Conjunctivae normal.   Neck:      Comments: Right-sided Rhino Rocket in place  Pulmonary:      Effort: Pulmonary effort is normal. No respiratory distress.   Abdominal:      General: Abdomen is flat. There is no distension.   Musculoskeletal:         General: No deformity. Normal range of motion.      Cervical back: Normal range of motion and neck supple.   Skin:     General: Skin is warm and dry.   Neurological:      Mental Status: He is alert. Mental status is at baseline.      Comments: Oriented   Psychiatric:         Mood and Affect: Mood normal.         Behavior: Behavior normal.         ED Course, Procedures, & Data      Procedures              Results for orders placed or performed during the hospital encounter of 12/21/23   [place] Draw     Status: None ()    Narrative    The following orders were created for panel order [place] Draw.  Procedure                               Abnormality         Status                     ---------                               -----------         ------                     Extra Blue Top Tube[[id number]]                                                         Extra Red Top Tube[[id number]]                                                          Extra Green Top (Lithium...[[id number]]                                                 Extra Purple Top Tube[[id number]]                                                          Please view results for these tests on the individual orders.   Results for orders placed or performed during the hospital encounter of 12/21/23   Basic metabolic panel     Status: Abnormal   Result Value Ref Range    Sodium 135 135 - 145 mmol/L    Potassium 4.6 3.4 - 5.3 mmol/L    Chloride 100 98 - 107 mmol/L    Carbon Dioxide (CO2) 27 22 - 29 mmol/L    Anion Gap 8 7 - 15 mmol/L    Urea Nitrogen 13.1 8.0 - 23.0 mg/dL    Creatinine 0.86 0.67 - 1.17 mg/dL    GFR Estimate 90 >60 mL/min/1.73m2    Calcium 8.4 (L) 8.8 - 10.2 mg/dL    Glucose 125 (H) 70 - 99 mg/dL   [place] Draw     Status: None    Narrative    The following orders were created for panel order [place] Draw.  Procedure                               Abnormality         Status                     ---------                               -----------         ------                     Extra Blue Top Tube[[id number]]                              Final result               Extra Red Top Tube[[id number]]                               Final result                 Please view results for these tests on the individual orders.   CBC with platelets and differential     Status: Abnormal   Result Value Ref Range    WBC Count 7.1 4.0 - 11.0 10e3/uL    RBC Count 4.07 (L) 4.40 - 5.90 10e6/uL    Hemoglobin 12.2 (L) 13.3 - 17.7 g/dL    Hematocrit 37.9 (L) 40.0 - 53.0 %    MCV 93 78 - 100 fL    MCH 30.0 26.5 - 33.0 pg    MCHC 32.2 31.5 - 36.5 g/dL    RDW 13.9 10.0 - 15.0 %    Platelet Count 244 150 - 450 10e3/uL    % Neutrophils 57 %    % Lymphocytes 29 %    % Monocytes 10 %    % Eosinophils 4 %    % Basophils 0 %    % Immature Granulocytes 0 %    NRBCs per 100 WBC 0 <1 /100    Absolute Neutrophils 4.0 1.6 - 8.3 10e3/uL    Absolute Lymphocytes 2.0 0.8 - 5.3 10e3/uL    Absolute Monocytes 0.7 0.0 - 1.3 10e3/uL    Absolute Eosinophils 0.3 0.0 - 0.7 10e3/uL    Absolute Basophils 0.0 0.0 - 0.2 10e3/uL    Absolute Immature Granulocytes 0.0 <=0.4 10e3/uL    Absolute NRBCs 0.0  10e3/uL   Extra Blue Top Tube     Status: None   Result Value Ref Range    Hold Specimen JIC    Extra Red Top Tube     Status: None   Result Value Ref Range    Hold Specimen JIC    Hemoglobin     Status: Abnormal   Result Value Ref Range    Hemoglobin 11.9 (L) 13.3 - 17.7 g/dL   Glucose by meter     Status: Abnormal   Result Value Ref Range    GLUCOSE BY METER POCT 121 (H) 70 - 99 mg/dL   CBC with platelets + differential     Status: Abnormal    Narrative    The following orders were created for panel order CBC with platelets + differential.  Procedure                               Abnormality         Status                     ---------                               -----------         ------                     CBC with platelets and d...[[id number]]  Abnormal            Final result                 Please view results for these tests on the individual orders.     Medications   sodium chloride 0.9% BOLUS 1,000 mL (has no administration in time range)     Labs Ordered and Resulted from Time of ED Arrival to Time of ED Departure - No data to display  CTA Head Neck with Contrast    (Results Pending)          Medical Decision Making  The patient's presentation is strongly suggestive of moderate complexity (an acute complicated injury).    The patient's evaluation involved:  review of external note(s) from 3+ sources (Most recent H&P in addition to clinic and ED note)  review of 2 test result(s) ordered prior to this encounter (Most recent BMP and CBC)    The patient's management involved further care after sign-out to oncoming physician (see their note for further management).    Assessment & Plan    78-year-old male presents to us with a chief complaint of persistent epistaxis.  He had previously sustained multiple facial fractures and is on Eliquis for PE.  Bleeding was somewhat temporized by a right-sided Rhino Rocket although the patient reports he is still bleeding his posterior oropharynx.  Discussed with OMFS  who recommended transfer here for evaluation from [facility].  They requested CT angio head and neck.  This is pending at this time.  Patient care be signed out to the oncoming physician    I have reviewed the nursing notes. I have reviewed the findings, diagnosis, plan and need for follow up with the patient.    New Prescriptions    No medications on file       Final diagnoses:   Epistaxis     I, Deloris Lopez, am serving as a trained medical scribe to document services personally performed by Dar Daniels DO, based on the provider's statements to me.     I, Dar Daniels DO, was physically present and have reviewed and verified the accuracy of this note documented by Deloris Lopez.    Dar Daniels DO  [facility] EMERGENCY DEPARTMENT  12/21/2023     Dar Daniels DO  12/21/23 1424

## 2023-12-22 VITALS
RESPIRATION RATE: 18 BRPM | OXYGEN SATURATION: 95 % | TEMPERATURE: 98 F | HEART RATE: 80 BPM | DIASTOLIC BLOOD PRESSURE: 75 MMHG | SYSTOLIC BLOOD PRESSURE: 113 MMHG

## 2023-12-22 LAB
ANION GAP SERPL CALCULATED.3IONS-SCNC: 12 MMOL/L (ref 7–15)
APIXABAN PPP CHRO-MCNC: 96 NG/ML
BUN SERPL-MCNC: 14.2 MG/DL (ref 8–23)
CALCIUM SERPL-MCNC: 8.4 MG/DL (ref 8.8–10.2)
CHLORIDE SERPL-SCNC: 103 MMOL/L (ref 98–107)
CREAT SERPL-MCNC: 0.82 MG/DL (ref 0.67–1.17)
DEPRECATED HCO3 PLAS-SCNC: 23 MMOL/L (ref 22–29)
EGFRCR SERPLBLD CKD-EPI 2021: >90 ML/MIN/1.73M2
ERYTHROCYTE [DISTWIDTH] IN BLOOD BY AUTOMATED COUNT: 14.3 % (ref 10–15)
GLUCOSE BLDC GLUCOMTR-MCNC: 121 MG/DL (ref 70–99)
GLUCOSE BLDC GLUCOMTR-MCNC: 127 MG/DL (ref 70–99)
GLUCOSE BLDC GLUCOMTR-MCNC: 179 MG/DL (ref 70–99)
GLUCOSE BLDC GLUCOMTR-MCNC: 96 MG/DL (ref 70–99)
GLUCOSE SERPL-MCNC: 95 MG/DL (ref 70–99)
HCT VFR BLD AUTO: 35.8 % (ref 40–53)
HGB BLD-MCNC: 11.6 G/DL (ref 13.3–17.7)
INR PPP: 1.04 (ref 0.85–1.15)
MAGNESIUM SERPL-MCNC: 1.9 MG/DL (ref 1.7–2.3)
MCH RBC QN AUTO: 29.8 PG (ref 26.5–33)
MCHC RBC AUTO-ENTMCNC: 32.4 G/DL (ref 31.5–36.5)
MCV RBC AUTO: 92 FL (ref 78–100)
PHOSPHATE SERPL-MCNC: 2.7 MG/DL (ref 2.5–4.5)
PLATELET # BLD AUTO: 288 10E3/UL (ref 150–450)
POTASSIUM SERPL-SCNC: 4.1 MMOL/L (ref 3.4–5.3)
RBC # BLD AUTO: 3.89 10E6/UL (ref 4.4–5.9)
SODIUM SERPL-SCNC: 138 MMOL/L (ref 135–145)
WBC # BLD AUTO: 7.9 10E3/UL (ref 4–11)

## 2023-12-22 PROCEDURE — 80048 BASIC METABOLIC PNL TOTAL CA: CPT | Performed by: PHYSICIAN ASSISTANT

## 2023-12-22 PROCEDURE — 85610 PROTHROMBIN TIME: CPT | Performed by: PHYSICIAN ASSISTANT

## 2023-12-22 PROCEDURE — 250N000011 HC RX IP 250 OP 636: Mod: JZ | Performed by: PHYSICIAN ASSISTANT

## 2023-12-22 PROCEDURE — 84100 ASSAY OF PHOSPHORUS: CPT | Performed by: PHYSICIAN ASSISTANT

## 2023-12-22 PROCEDURE — 36415 COLL VENOUS BLD VENIPUNCTURE: CPT | Performed by: PHYSICIAN ASSISTANT

## 2023-12-22 PROCEDURE — 93010 ELECTROCARDIOGRAM REPORT: CPT | Performed by: INTERNAL MEDICINE

## 2023-12-22 PROCEDURE — 250N000009 HC RX 250: Performed by: PHYSICIAN ASSISTANT

## 2023-12-22 PROCEDURE — 99222 1ST HOSP IP/OBS MODERATE 55: CPT | Performed by: INTERNAL MEDICINE

## 2023-12-22 PROCEDURE — 999N000147 HC STATISTIC PT IP EVAL DEFER

## 2023-12-22 PROCEDURE — 250N000013 HC RX MED GY IP 250 OP 250 PS 637: Performed by: STUDENT IN AN ORGANIZED HEALTH CARE EDUCATION/TRAINING PROGRAM

## 2023-12-22 PROCEDURE — 83735 ASSAY OF MAGNESIUM: CPT | Performed by: PHYSICIAN ASSISTANT

## 2023-12-22 PROCEDURE — 85027 COMPLETE CBC AUTOMATED: CPT | Performed by: PHYSICIAN ASSISTANT

## 2023-12-22 PROCEDURE — 93005 ELECTROCARDIOGRAM TRACING: CPT

## 2023-12-22 PROCEDURE — 999N000111 HC STATISTIC OT IP EVAL DEFER: Performed by: OCCUPATIONAL THERAPIST

## 2023-12-22 PROCEDURE — 250N000013 HC RX MED GY IP 250 OP 250 PS 637: Performed by: PHYSICIAN ASSISTANT

## 2023-12-22 PROCEDURE — 99239 HOSP IP/OBS DSCHRG MGMT >30: CPT | Performed by: PHYSICIAN ASSISTANT

## 2023-12-22 RX ORDER — METOPROLOL TARTRATE 1 MG/ML
5 INJECTION, SOLUTION INTRAVENOUS ONCE
Status: COMPLETED | OUTPATIENT
Start: 2023-12-22 | End: 2023-12-22

## 2023-12-22 RX ORDER — MAGNESIUM SULFATE HEPTAHYDRATE 40 MG/ML
2 INJECTION, SOLUTION INTRAVENOUS ONCE
Qty: 50 ML | Refills: 0 | Status: COMPLETED | OUTPATIENT
Start: 2023-12-22 | End: 2023-12-22

## 2023-12-22 RX ORDER — CEPHALEXIN 500 MG/1
500 CAPSULE ORAL EVERY 12 HOURS
Qty: 13 CAPSULE | Refills: 0 | Status: SHIPPED | OUTPATIENT
Start: 2023-12-22 | End: 2023-12-29

## 2023-12-22 RX ORDER — ECHINACEA PURPUREA EXTRACT 125 MG
TABLET ORAL
Qty: 104 ML | Refills: 1 | Status: SHIPPED | OUTPATIENT
Start: 2023-12-22

## 2023-12-22 RX ADMIN — SALINE NASAL SPRAY 2 SPRAY: 1.5 SOLUTION NASAL at 05:56

## 2023-12-22 RX ADMIN — CEPHALEXIN 500 MG: 500 CAPSULE ORAL at 08:22

## 2023-12-22 RX ADMIN — ACETAMINOPHEN 650 MG: 325 TABLET, FILM COATED ORAL at 04:32

## 2023-12-22 RX ADMIN — FERROUS SULFATE TAB 325 MG (65 MG ELEMENTAL FE) 325 MG: 325 (65 FE) TAB at 08:22

## 2023-12-22 RX ADMIN — MAGNESIUM SULFATE HEPTAHYDRATE 2 G: 40 INJECTION, SOLUTION INTRAVENOUS at 12:38

## 2023-12-22 RX ADMIN — SALINE NASAL SPRAY 2 SPRAY: 1.5 SOLUTION NASAL at 08:23

## 2023-12-22 RX ADMIN — INSULIN ASPART 1 UNITS: 100 INJECTION, SOLUTION INTRAVENOUS; SUBCUTANEOUS at 12:42

## 2023-12-22 RX ADMIN — METOPROLOL TARTRATE 5 MG: 1 INJECTION, SOLUTION INTRAVENOUS at 11:37

## 2023-12-22 RX ADMIN — METOPROLOL SUCCINATE 50 MG: 50 TABLET, EXTENDED RELEASE ORAL at 08:22

## 2023-12-22 RX ADMIN — SALINE NASAL SPRAY 2 SPRAY: 1.5 SOLUTION NASAL at 09:45

## 2023-12-22 RX ADMIN — SALINE NASAL SPRAY 2 SPRAY: 1.5 SOLUTION NASAL at 13:33

## 2023-12-22 RX ADMIN — SALINE NASAL SPRAY 2 SPRAY: 1.5 SOLUTION NASAL at 12:39

## 2023-12-22 RX ADMIN — SALINE NASAL SPRAY 2 SPRAY: 1.5 SOLUTION NASAL at 04:24

## 2023-12-22 RX ADMIN — APIXABAN 10 MG: 5 TABLET, FILM COATED ORAL at 09:44

## 2023-12-22 RX ADMIN — ACETAMINOPHEN 650 MG: 325 TABLET, FILM COATED ORAL at 00:29

## 2023-12-22 RX ADMIN — ACETAMINOPHEN 650 MG: 325 TABLET, FILM COATED ORAL at 08:22

## 2023-12-22 ASSESSMENT — ACTIVITIES OF DAILY LIVING (ADL)
ADLS_ACUITY_SCORE: 35
ADLS_ACUITY_SCORE: 35
ADLS_ACUITY_SCORE: 21
ADLS_ACUITY_SCORE: 21
ADLS_ACUITY_SCORE: 35
ADLS_ACUITY_SCORE: 21
ADLS_ACUITY_SCORE: 21
ADLS_ACUITY_SCORE: 35

## 2023-12-22 NOTE — CONSULTS
Otolaryngology Consult Note  December 21, 2023      CC: epistaxis    HPI: Shola Lemus is a 78 year old male with a past medical history of HTN, HLD, T2DM, and recent PE for which he started eliquis. On 12/15/2023 he had a fall caused by a coughing spell and unconsciousness, and was found to have a LeFort I fracture and nasal bone fractures. A couple days ago he started to have bleeding from the left side of his nose. This bleeding stopped with a tissue. However it later started on the right side of his nose. He was also able to stop this bleeding at the time. He woke up with bleeding from the right side of his nose this morning and has been unsuccessful in stopping it. ENT was consulted for assistance in management of epistaxis.    This patient's epistaxis began at 7am today. Blood is dripping from the right side of the anterior nose and down the posterior oropharynx. Patient is on eliquis for anticoagulation. Most recent hemoglobin is 11.9. OMFS saw him upon presentation to the ED and placed a rhinorocket in the right nasal cavity. He continued to have bleeding down the back of his throat.     Past Medical History:   Diagnosis Date    Concussion 20 years ago    Diabetes mellitus (H)     Hypercholesteremia     Hypertension        Past Surgical History:   Procedure Laterality Date    HEAD & NECK SURGERY      tonsillectomy    HERNIA REPAIR      bilat       Current Outpatient Medications   Medication Sig Dispense Refill    acetaminophen (TYLENOL) 325 MG tablet Take 2 tablets (650 mg) by mouth every 4 hours as needed for mild pain or other (and adjunct with moderate or severe pain or per patient request)      Apixaban Starter Pack (ELIQUIS DVT/PE STARTER PACK) 5 MG TBPK Take 10 mg by mouth 2 times daily for 7 days, THEN 5 mg 2 times daily for 23 days. 70 each 0    aspirin 81 MG EC tablet Take 81 mg by mouth daily      atorvastatin (LIPITOR) 40 MG tablet Take 1 tablet (40 mg) by mouth every evening      [START ON  12/22/2023] ferrous sulfate (FEROSUL) 325 (65 Fe) MG tablet Take 1 tablet (325 mg) by mouth daily (with breakfast) 30 tablet 0    glipiZIDE (GLUCOTROL) 10 MG tablet Take 10 mg by mouth 2 times daily (before meals)      insulin glargine (LANTUS PEN) 100 UNIT/ML pen Inject 65 Units Subcutaneous 2 times daily      metFORMIN (GLUCOPHAGE) 500 MG tablet Take 1,000 mg by mouth 2 times daily (with meals)      metoprolol succinate ER (TOPROL XL) 50 MG 24 hr tablet Take 50 mg by mouth daily      nirmatrelvir and ritonavir (PAXLOVID) 300 mg/100 mg therapy pack Take 3 tablets by mouth 2 times daily for 4 days . Finish remainder of Paxlovid therapy pack that was started in the hospital.  Follow instructions on that pack.          No Known Allergies    Social History     Socioeconomic History    Marital status:      Spouse name: Not on file    Number of children: Not on file    Years of education: Not on file    Highest education level: Not on file   Occupational History    Not on file   Tobacco Use    Smoking status: Never    Smokeless tobacco: Never   Substance and Sexual Activity    Alcohol use: Yes     Comment: one drink every 2 weeks    Drug use: No    Sexual activity: Not on file   Other Topics Concern    Not on file   Social History Narrative    Not on file     Social Determinants of Health     Financial Resource Strain: Not on file   Food Insecurity: Not on file   Transportation Needs: Not on file   Physical Activity: Not on file   Stress: Not on file   Social Connections: Not on file   Interpersonal Safety: Not on file   Housing Stability: Not on file       No family history on file.    ROS: 12 point review of systems is negative unless noted in HPI.    PHYSICAL EXAM:  General: laying in bed, no acute distress  BP (!) 141/65   Pulse 74   Temp 98.5  F (36.9  C) (Axillary)   Resp 16   SpO2 97%   HEAD: Signs of facial trauma. Lacerations over the nasal bridge. Bruising around the eyes.   Eyes: EOMI, clear  sclera  Ears: external ear without drainage  Nose: Rhinorocket in the right nasal cavity.  Mouth: moist, no ulcers, tongue midline and symmetric  Oropharynx: tonsils within normal limits, uvula midline, Blood clot seen in the posterior oropharynx. Large clot removed with bayonet forceps. Once the clot was removed there was persistent active bleeding seen down the posterior oropharynx.   Neck: no LAD, trachea midline    NASAL ENDOSCOPY:  Due to epistaxis, nasal endoscopy was indicated. The rhinorocket was removed from the right nasal cavity. After obtaining verbal consent, the rigid zero degree scope was passed under endoscopic vision through the right nasal passage. The nasal mucosa was pink and moist, sensation intact throughout.  The turbinates were normal. There was yellow/white drainage throughout the middle meatus. Blood was observed in the nasopharynx. Along the floor of the nasal cavity, in the posterior aspect, there was an actively pumping artery seen.     ROUTINE IP LABS (Last four results)  BMP  Recent Labs   Lab 12/21/23  1703 12/21/23  1324 12/21/23  1009 12/17/23  1441 12/17/23  0829 12/17/23  0735 12/16/23  0857 12/16/23  0830 12/15/23  2334 12/15/23  1734   NA  --   --  135  --   --   --   --  137  --  131*   POTASSIUM  --   --  4.6  --   --   --   --  4.1  --  4.0   CHLORIDE  --   --  100  --   --   --   --  102  --  96*   RHONDA  --   --  8.4*  --   --   --   --  8.2*  --  8.5*   CO2  --   --  27  --   --   --   --  27  --  25   BUN  --   --  13.1  --   --   --   --  11.4  --  11.7   CR  --   --  0.86  --   --  0.86  --  0.84  --  0.95   * 121* 125* 199*   < >  --    < > 168*   < > 135*    < > = values in this interval not displayed.     CBC  Recent Labs   Lab 12/21/23  1231 12/21/23  1009 12/17/23  1058 12/16/23  0830 12/15/23  1734   WBC  --  7.1  --  6.0 10.1   RBC  --  4.07*  --  4.17* 4.36*   HGB 11.9* 12.2* 12.1* 12.6* 13.0*   HCT  --  37.9*  --  38.4* 39.9*   MCV  --  93  --  92 92   MCH   --  30.0  --  30.2 29.8   MCHC  --  32.2  --  32.8 32.6   RDW  --  13.9  --  14.1 13.8   PLT  --  244  --  210 230     INR  Recent Labs   Lab 12/21/23  1421   INR 1.19*       IMAGING:  CTA Head and Neck   Impression:    1. Contrast extravasation/evidence of active bleeding from small  branches off of the right sphenopalatine artery, originating at the  entrance into the posterior lateral right ethmoidal sinus, detailed  above. Grossly stable LeFort fractures without new facial fracture  identified. Blood and possible superimposed mucus within the paranasal  sinuses, notably complete opacification of the right maxillary and  ethmoidal sinuses.     2. Otherwise unremarkable CTA of the head and neck. Left greater than  right mild-to-moderate atherosclerotic plaque at the carotid bulbs  without significant luminal narrowing.     3. Noncontrast head CT demonstrates no acute intracranial pathology.     4. Reactive bilateral perijugular, lower cervical, and superior  mediastinal lymph nodes.      Assessment and Plan  Shola Lemus is a 78 year old male with a past medical history of PE started on eliquis and recent facial trauma presenting to the ED for persistent nasal bleeding, found to have an SPA bleed on imaging and visualized on endoscopic exam. Fibrillar was placed in the area of the bleed. An 8cm merocel was placed along the floor of the right nasal cavity. A second 8cm merocel was placed above this to apply pressure to the area. The patient was observed and there was no longer blood in the oropharynx.     - Keep merocels in place for 5-7 days.   - Recommend antibiotics while nasal packing in place - Keflex is a good choice  - There may be some slow dark red oozing after the packing was placed - this is normal   - Nasal saline spray to bilateral nasal cavities (directly on the merocels) q2h daily (this keeps the packing moist and will help with removal)  - If supplemental O2 is required, add humidifier (RT  should be contacted to supply this)  - If patient continues bleeding plan to consult IR for intervention  - Transfuse as needed per Primary Team  - Remainder of cares per Primary Team  - If patient is stable for discharge in next 1-2 days, would recommend that the patient follow up in ENT clinic in 5-7 days to have Merocels removed  - Feel free to call with any questions or concerns    This patient and above plan was discussed with staff surgeon, Dr. Cronin.    Melanie Phelan, PGY 1  Otolaryngology-Head & Neck Surgery  Please contact ENT by dialing [phone] and entering job code [phone].

## 2023-12-22 NOTE — PLAN OF CARE
Goal Outcome Evaluation:    Status: Admit 12/21 for uncontrolled epistaxis, Lefort 1 and nasal bone fratures from a fall. Pt is Covid +  Vitals: VSS ex intermittently elevated BP, not within parameters to notify.  Neuros: A+Ox4  IV: R and L PIV SL  Diet: Full liquid  GI/: BM overnight  Skin: Bruising under eyes, scab on R knee, packing in R nostril.   Pain: C/o some facial/head pain relieved with prn tylenol  Activity: Independent/SBA  Updates this shift: Pt is on nasal precautions. Saline nasal spray added q2h while awake. Prn afrin spray added for nasal bleeding.    Plan: Continue with plan of care.

## 2023-12-22 NOTE — PROGRESS NOTES
ORAL & MAXILLOFACIAL SURGERY   PROGRESS NOTE  Shola Lemus,  MRN: [id number],  : 1945           ASSESSMENT:  77 yo male with DM2, PE on eliquis with right lefort 1,  incomplete left lefort 1 fractures and nasal bone fractures with continuous epistaxis that was managed by ENT with a merocel.    PLAN:  - Appreciate ENT recs  - Sinus precautions     Please follow SINUS PRECAUTIONS which are:   - No nose blowing   - No closed mouth sneezing  - No spitting  - No heavy lifting or contact sports  - No bending over (upside down)    Discussed with chief resident     Beverly Rosenthal, SHANEKA  Oral & Maxillofacial Surgery, Intern    Please contact the OMFS resident on-call with questions or concerns.  ____________________________________      SUBJECTIVE:  Patient stated that he feels pretty good, and that his only complaint is that his teeth on the right dont necessarily feel like they are coming together correctly. The patient stated that the bleeding has since stopped, but was worried about it starting up again when he discharged.       PHYSICAL EXAM:   Vitals:    23 1045 23 1130 23 1137 23 1148   BP: 132/71 98/60 98/58 113/75   BP Location:    Right arm   Pulse:  (!) 141 (!) 139 80   Resp:  24  18   Temp:    98  F (36.7  C)   TempSrc:    Oral   SpO2: 95% 95%  95%        HEENT: NC, There are signs of external trauma, two horizontal lacerations about 4 mm along the superior bridge of the nose.       Ears: External ears are normal with no hearing loss.      Eyes: Bilateral periorbital ecchymosis. Pupils equal round and reactive to light, Extraocular eye movement intact, no subconjunctival hemorrhage, hyphema, chemosis.       Nose: Rhino rocket placed in the right nasal. Deviation of the left nasal septum. Dry blood inside of the left nasal      Mouth: The buccal vestibules are pink with a laceration of the inside of the upper lip between 8 and 9. The laceration is about 6 mm angled horizontally..   The dentition is mostly intact, with good oral hygiene. Floor of mouth soft nontender, nondistended, no lateral pharyngeal swelling. Maximum interincisal opening is X~40. TMJ is tender bilaterally. Blood and blood clot can be seen draining down the patient's oropharynx..     LABS:   CBC RESULTS:   Recent Labs   Lab Test 12/22/23  0550   WBC 7.9   RBC 3.89*   HGB 11.6*   HCT 35.8*   MCV 92   MCH 29.8   MCHC 32.4   RDW 14.3         Last Comprehensive Metabolic Panel:  Sodium   Date Value Ref Range Status   12/22/2023 138 135 - 145 mmol/L Final     Comment:     Reference intervals for this test were updated on 09/26/2023 to more accurately reflect our healthy population. There may be differences in the flagging of prior results with similar values performed with this method. Interpretation of those prior results can be made in the context of the updated reference intervals.      Potassium   Date Value Ref Range Status   12/22/2023 4.1 3.4 - 5.3 mmol/L Final     Potassium POCT   Date Value Ref Range Status   06/04/2022 3.7 3.4 - 5.3 mmol/L Final     Chloride   Date Value Ref Range Status   12/22/2023 103 98 - 107 mmol/L Final     Carbon Dioxide (CO2)   Date Value Ref Range Status   12/22/2023 23 22 - 29 mmol/L Final     Anion Gap   Date Value Ref Range Status   12/22/2023 12 7 - 15 mmol/L Final     GLUCOSE BY METER POCT   Date Value Ref Range Status   12/22/2023 179 (H) 70 - 99 mg/dL Final     Urea Nitrogen   Date Value Ref Range Status   12/22/2023 14.2 8.0 - 23.0 mg/dL Final     Creatinine   Date Value Ref Range Status   12/22/2023 0.82 0.67 - 1.17 mg/dL Final     GFR Estimate   Date Value Ref Range Status   12/22/2023 >90 >60 mL/min/1.73m2 Final     Calcium   Date Value Ref Range Status   12/22/2023 8.4 (L) 8.8 - 10.2 mg/dL Final        RADIOLOGY:  No new imaging

## 2023-12-22 NOTE — DISCHARGE INSTRUCTIONS
Per ENT Team:  - Keep merocels in place for 5-7 days.   - Recommend antibiotics while nasal packing in place - Keflex is a good choice  - There may be some slow dark red oozing after the packing was placed - this is normal   - Nasal saline spray to bilateral nasal cavities (directly on the merocels) every 2 hours daily   - Follow up in ENT clinic in 5-7 days to have Merocels removed      Go to the ED if you feel increased bleeding after restarting your Eliquis.    Do not take your Aspirin at this time, until your fractures heal, about 6-8 weeks,

## 2023-12-22 NOTE — PLAN OF CARE
Status: Admitted 12/21 for ongoing epistaxis after a fall that resulted in LeFort I and nasal bone fractures. COVID +.  Vitals: VSS on RA. Run of SVT where HR was sustained in 140's. 5 mg IV metoprolol given with results. Hx of a fib.   Neuros: A&O x4. 5/5 throughout. Baseline neuropathy to bilateral feet.   IV: PIV removed for discharge  Labs/Electrolytes: Two 12-lead EKG's showing SVT and then NSR  Resp/trach: WNL  Diet: Full liquid diet d/t facial fractures  Bowel status: LBM 12/22. Stools black from iron supplement and swallowing blood from nose bleed  : Voiding spontaneously  Skin: Bruising under both eyes. R knee scab. R nostril packing  Pain: PRN Tylenol x1  Activity: Up ad lauri  Plan: Discharge home    Discharge time/date: 12/22 1530ish  Walked or Wheelchair: Wheelchair  PIV removed: Yes  Reviewed AVS with patient: Yes  Medication due times added to AVS in EPIC: Yes  Verbalized understanding of discharge with teachback: Yes  Medications retrieved from pharmacy: Yes  Supplies sent home: Yes  Belongings from security with patient: NA    Goal Outcome Evaluation:      Plan of Care Reviewed With: patient    Overall Patient Progress: no change    Outcome Evaluation: Discharge home

## 2023-12-22 NOTE — DISCHARGE SUMMARY
[facility]    Discharge Summary  Trauma Service     Date of Admission:  12/21/2023  Date of Discharge:  12/22/2023  Attending Physician: Dr Michael Greene  Discharging Provider: Silvia SHAIKH  Date of Service (when I saw the patient): 12/22/23    Primary Provider: Bryan Patrick  Primary Care clinic: [address] [place] [address] [address] [facility] [id number]  Phone: [phone]  Fax number: [phone]     Discharge Diagnoses   Time/date of injury:12/15/23  Known Injuries:  Active bleeding from small branches off of the right sphenopalatine artery  Original Injuries  Nasal Fracture   [place] I Fracture     Hospital Course   Shola Lemus is a 78 year old male who presented to the ED at [facility] d/t continued epistaxis. Patient was transferred here per OMFS for surgical intervention. Admitted to the Trauma service for management.     # Active bleeding from small branches off of the right sphenopalatine artery  OMFS decided not to peruse  surgery and ENT was able to stop active bleed with Merocel. ENT recommendations are as follows:   Keep merocels in place for 5-7 days.   Recommend antibiotics while nasal packing in place - Keflex is a good choice  There may be some slow dark red oozing after the packing was placed - this is normal   Nasal saline spray to bilateral nasal cavities (directly on the merocels) q2h daily (this keeps the packing moist and will help with removal)  If patient is stable for discharge in next 1-2 days, would recommend that the patient follow up in ENT clinic in 5-7 days to have Merocels removed    # Pulmonary Embolism   # Platelet Dysfunction, 2/2 ASA   # Coagulopathy   Hematology was consulted and saw patient for evaluation of reversal prior to surgery and long term anticoagulation plan. Patient was diagnosis with a Pulmonary Embolism during previous admission at [facility]. Per chart review, started on  Lovenox 1 mg/kg body weight  twice daily, transition to Eliquis on 12/17/23. Patient was not reversed, Eliquis was held for surgical planning.   Hematology recommendations on 12/22/23:  --Restart apixaban when ENT team feels this is reasonable from their perspective with regards to risk of epistaxis  --If ENT feels apixaban can be restarted within 1-2 days, then would not make further interventions (although would monitor inpatient for 24 hours after restarting apixaban to make sure no recurrent bleeding events)  --If ENT does not feel apixaban can be restarted for a longer period of time then would have to consider intervention such as IVC filter (with imaging of lower extremities first to exclude baseline DVT)   Discussed with patient. He currently has had no bleeding since ENT Merocels. He feels fine restarting Eliquis and coming back to the ED if he restarts bleeding, like he did previously.   Recommended patient hold ASA until facial bones are healed d/t platelet dysfunction and continued epistaxis.      # Afib with RVR, resolved   Patient went into an episode of Afib with RVR on 12/22/23. Patient has a history of Afib and takes Metoprolol Succinate 50mg for rate control. This medication has been continued during admission, however, it was given later then when the patient takes it. Per patient he takes the medication at night and it was given to him at 0822 this morning. It is common for patient's with a history of afib to have rvr after a trauma. Even though the episode was on 12/15/23. Patient had additional bleeding and procedures. The rate was improved with IV metoprolol. Patient was given Magnesium Sulfate 2g IV afterwards. Mag this am was 1.9, Na was 1.8, K 4.1, phos 2.7. Patient's blood pressure remained stable throughout and he remained asymptomatic. Follow-up EKG showed sinus rhythm.      Patient had ZioPatch placed on 12/17/23. He was instructed to follow-up with his Cardiologist.       # COVID positive   Patient was diagnosed  at [facility] on 12/15/23. He completed Paxlovid dose. He is currently asymptomatic. Placed on COVID isolation during admission.     Code Status   Full Code    SUBJECTIVE: Shola Lemus is a 78 year old male who presented to the ED at [facility] d/t continued epistaxis. Patient was transferred here per OMFS for surgical intervention. Admitted to the Trauma service for management. Patient's admission was complicated by acute bleeding and afib with RVR, all of which was resolved prior to discharge.     Physical Exam   Temp: 98  F (36.7  C) Temp src: Oral BP: 139/78 Pulse: 104   Resp: 16 SpO2: 98 % O2 Device: None (Room air)    There were no vitals filed for this visit.  Vital Signs with Ranges  Temp:  [97  F (36.1  C)-98.5  F (36.9  C)] 98  F (36.7  C)  Pulse:  [] 104  Resp:  [10-37] 16  BP: ()/() 139/78  SpO2:  [87 %-100 %] 98 %  I/O last 3 completed shifts:  In: -   Out: 600 [Urine:600]        Constitutional: Awake, alert, cooperative, no apparent distress.  Eyes: periorbital edema, yellowing ecchymosis, PERRLA, pupils 3mm, EOMI, corneas and conjunctivae clear   HENT: Normocephalic, nasal septum daya   Respiratory: No increased work of breathing, good air exchange, clear to auscultation bilaterally, no crackles or wheezing.  Cardiovascular:  regular rate and rhythm,   GI: Abdomen soft, non-distended, non-tender, no guarding  Genitourinary:  voids spont   Skin:  Warm & dry  Musculoskeletal: There is no redness, warmth, or swelling of the joints. Able to ambulate without assistance   Neurologic: Awake, alert, oriented. Cranial nerves II-XII are grossly intact.  Strength and sensory is intact. No focal deficits.  Neuropsychiatric: Calm, normal eye contact, alert, affect appropriate to situation, oriented, thought process normal.    Discharge Disposition   Discharged to home  Condition at discharge: Stable  Discharge VS: Blood pressure 139/78, pulse 104, temperature 98  F (36.7  C), temperature  source Oral, resp. rate 16, SpO2 98%.    Consultations This Hospital Stay   HEMATOLOGY ADULT IP CONSULT  PHYSICAL THERAPY ADULT IP CONSULT  CARE MANAGEMENT / SOCIAL WORK IP CONSULT  OCCUPATIONAL THERAPY ADULT IP CONSULT  ENT IP CONSULT  PHARMACY IP CONSULT    Discharge Orders   No discharge procedures on file.  Discharge Medications   Current Discharge Medication List        CONTINUE these medications which have NOT CHANGED    Details   acetaminophen (TYLENOL) 325 MG tablet Take 2 tablets (650 mg) by mouth every 4 hours as needed for mild pain or other (and adjunct with moderate or severe pain or per patient request)      Apixaban Starter Pack (ELIQUIS DVT/PE STARTER PACK) 5 MG TBPK Take 10 mg by mouth 2 times daily for 7 days, THEN 5 mg 2 times daily for 23 days.  Qty: 70 each, Refills: 0    Associated Diagnoses: Syncope and collapse; Other acute pulmonary embolism without acute cor pulmonale (H)      aspirin 81 MG EC tablet Take 81 mg by mouth daily      atorvastatin (LIPITOR) 40 MG tablet Take 1 tablet (40 mg) by mouth every evening    Comments: Hold while on paxlovid      ferrous sulfate (FEROSUL) 325 (65 Fe) MG tablet Take 1 tablet (325 mg) by mouth daily (with breakfast)  Qty: 30 tablet, Refills: 0    Comments: Dont start if stool remains black  Associated Diagnoses: Other acute pulmonary embolism without acute cor pulmonale (H)      glipiZIDE (GLUCOTROL) 10 MG tablet Take 10 mg by mouth 2 times daily (before meals)      insulin glargine (LANTUS PEN) 100 UNIT/ML pen Inject 65 Units Subcutaneous 2 times daily      metFORMIN (GLUCOPHAGE) 500 MG tablet Take 1,000 mg by mouth 2 times daily (with meals)      metoprolol succinate ER (TOPROL XL) 50 MG 24 hr tablet Take 50 mg by mouth daily           STOP taking these medications       nirmatrelvir and ritonavir (PAXLOVID) 300 mg/100 mg therapy pack Comments:   Reason for Stopping:             Allergies   No Known Allergies  Data   Most Recent 3 CBC's:  Recent Labs    Lab Test 12/22/23  0550 12/21/23  1231 12/21/23  1009 12/17/23  1058 12/16/23  0830   WBC 7.9  --  7.1  --  6.0   HGB 11.6* 11.9* 12.2*   < > 12.6*   MCV 92  --  93  --  92     --  244  --  210    < > = values in this interval not displayed.      Most Recent 3 BMP's:  Recent Labs   Lab Test 12/22/23  0758 12/22/23  0550 12/22/23  0423 12/21/23  1324 12/21/23  1009 12/17/23  0829 12/17/23  0735 12/16/23  0857 12/16/23  0830   NA  --  138  --   --  135  --   --   --  137   POTASSIUM  --  4.1  --   --  4.6  --   --   --  4.1   CHLORIDE  --  103  --   --  100  --   --   --  102   CO2  --  23  --   --  27  --   --   --  27   BUN  --  14.2  --   --  13.1  --   --   --  11.4   CR  --  0.82  --   --  0.86  --  0.86  --  0.84   ANIONGAP  --  12  --   --  8  --   --   --  8   RHONDA  --  8.4*  --   --  8.4*  --   --   --  8.2*   GLC 96 95 127*   < > 125*   < >  --    < > 168*    < > = values in this interval not displayed.     Most Recent 2 LFT's:  Recent Labs   Lab Test 12/16/23  0830 12/15/23  1734   AST 26 31   ALT 24 27   ALKPHOS 76 84   BILITOTAL 0.4 0.8     Most Recent INR's and Anticoagulation Dosing History:  Anticoagulation Dose History          Latest Ref Rng & Units 12/21/2023 12/22/2023   Recent Dosing and Labs   INR 0.85 - 1.15 1.19  1.04      Most Recent 3 Troponin's:No lab results found.  Most Recent 6 Bacteria Isolates From Any Culture (See EPIC Reports for Culture Details):No lab results found.  Most Recent TSH, T4 and A1c Labs:  Recent Labs   Lab Test 12/15/23  1734   A1C 7.9*     Results for orders placed or performed during the hospital encounter of 12/21/23   CTA Head Neck with Contrast     Value    Radiologist flags Right sphenopalatine artery branch active bleed (Urgent)    Narrative    CTA HEAD NECK W CONTRAST, CT HEAD W/O CONTRAST 12/21/2023 3:01 PM    Head CT without contrast  CT angiogram of the neck   CT angiogram of the base of the brain with contrast  Reconstruction by the Radiologist on  the 3D workstation    Provided History:  Previous traumatic facial injuries [place]  fractures.  Persistent right-sided epistaxis starting today OMFS  recommending CT scan to assess for location of bleed  ICD-10:    Comparison: CT head 12/15/2023.    Technique:  HEAD CT:  Using multidetector thin collimation helical acquisition  technique, axial, coronal and sagittal CT images from the skull base  to the vertex were obtained without intravenous contrast.   HEAD and NECK CTA: During rapid bolus intravenous injection of  nonionic contrast material, axial images were obtained using thin  collimation multidetector helical technique from the base of the skull  through the [place] of [place]. This CT angiogram data was reconstructed  at thin intervals with mild overlap. Images were sent to the [employer]  workstation, and 3D reconstructions were obtained. The axial source  images, multiplanar reformations, 3D reconstructions in both maximum  intensity projection display and volume rendered models were reviewed,  with reconstructions performed by the technologist and the  radiologist.    Contrast: 67cc of isovue 370    Findings:  Head CT: No intracranial hemorrhage, mass effect, midline shift,  hydrocephalus, acute loss of gray-white matter differentiation  throughout the cerebral hemispheres, or evidence of acute infarct.  Moderate diffuse cerebral parenchymal volume loss. Moderate  periventricular scattered white matter patchy low attenuation likely  related to chronic some vessel ischemic disease. Multiple mildly  displaced chronic LeFort facial fractures. Complete opacification of  the right maxillary sinus, with moderate thickening of mild  superimposed debris of the left maxillary sinus, and frothy  debris/fluid within the nasopharynx. Right nasal catheter in place.  Additional debris within the frontal sinuses. The opacification within  the sinuses is likely mixed mucus and/or blood products with mildly  hyperdense  components. No definite facial fractures. No significant  appreciable periapical dental disease. Grossly normal orbits. The  mastoid air cells are clear.    CTA studies:  There is slight linear streaky hyperdensity and punctate ill-defined  contrast blush off a small branch of the right distal sphenopalatine  artery, as a branch enters the right posterior ethmoidal sinus (series  4 image [id number]). The punctate blush of contrast is specifically on  image 335. Additional questionable small blush extravasation foci  originating from likely small branches of the very distal right  sphenopalatine artery.    Head CTA demonstrates no aneurysm or significant stenosis of the major  intracranial arteries. Left fetal PCA. Mild bilateral ICA carotid  siphon calcification.    Neck CTA demonstrates no stenosis of the major cervical arteries.  Normal great vessel arch origin anatomy, with mild-to-moderate  scattered atherosclerotic calcification. The normal distal right  internal carotid artery measures 5 mm. The normal distal left internal  carotid artery measures 5 mm. Moderate calcified plaque at the left  carotid bifurcation without significant luminal narrowing. Mild  calcified plaque at the right carotid bifurcation without significant  luminal narrowing.     No mass is noted within the visualized portions of the cervical soft  tissues or lung apices. Numerous presumed reactive bilateral  perijugular and lower cervical, and superior mediastinal lymph nodes.      Impression    Impression:    1. Contrast extravasation/evidence of active bleeding from small  branches off of the right sphenopalatine artery, originating at the  entrance into the posterior lateral right ethmoidal sinus, detailed  above. Grossly stable LeFort fractures without new facial fracture  identified. Blood and possible superimposed mucus within the paranasal  sinuses, notably complete opacification of the right maxillary and  ethmoidal sinuses.    2.  Otherwise unremarkable CTA of the head and neck. Left greater than  right mild-to-moderate atherosclerotic plaque at the carotid bulbs  without significant luminal narrowing.    3. Noncontrast head CT demonstrates no acute intracranial pathology.    4. Reactive bilateral perijugular, lower cervical, and superior  mediastinal lymph nodes.    [Urgent Result: Right sphenopalatine artery branch active bleed]    Finding was identified on 12/21/2023 3:04 PM.     Dr. Munoz was contacted by Dr. Foley at 12/21/2023 3:49 PM and  verbalized understanding of the urgent finding.     I have personally reviewed the examination and initial interpretation  and I agree with the findings.    ESTRADA STAPLES MD         SYSTEM ID:  [id number]   CT Head w/o Contrast     Value    Radiologist flags Right sphenopalatine artery branch active bleed (Urgent)    Narrative    CTA HEAD NECK W CONTRAST, CT HEAD W/O CONTRAST 12/21/2023 3:01 PM    Head CT without contrast  CT angiogram of the neck   CT angiogram of the base of the brain with contrast  Reconstruction by the Radiologist on the 3D workstation    Provided History:  Previous traumatic facial injuries [place]  fractures.  Persistent right-sided epistaxis starting today OMFS  recommending CT scan to assess for location of bleed  ICD-10:    Comparison: CT head 12/15/2023.    Technique:  HEAD CT:  Using multidetector thin collimation helical acquisition  technique, axial, coronal and sagittal CT images from the skull base  to the vertex were obtained without intravenous contrast.   HEAD and NECK CTA: During rapid bolus intravenous injection of  nonionic contrast material, axial images were obtained using thin  collimation multidetector helical technique from the base of the skull  through the [place] of [place]. This CT angiogram data was reconstructed  at thin intervals with mild overlap. Images were sent to the [employer]  workstation, and 3D reconstructions were obtained. The axial source  images,  multiplanar reformations, 3D reconstructions in both maximum  intensity projection display and volume rendered models were reviewed,  with reconstructions performed by the technologist and the  radiologist.    Contrast: 67cc of isovue 370    Findings:  Head CT: No intracranial hemorrhage, mass effect, midline shift,  hydrocephalus, acute loss of gray-white matter differentiation  throughout the cerebral hemispheres, or evidence of acute infarct.  Moderate diffuse cerebral parenchymal volume loss. Moderate  periventricular scattered white matter patchy low attenuation likely  related to chronic some vessel ischemic disease. Multiple mildly  displaced chronic LeFort facial fractures. Complete opacification of  the right maxillary sinus, with moderate thickening of mild  superimposed debris of the left maxillary sinus, and frothy  debris/fluid within the nasopharynx. Right nasal catheter in place.  Additional debris within the frontal sinuses. The opacification within  the sinuses is likely mixed mucus and/or blood products with mildly  hyperdense components. No definite facial fractures. No significant  appreciable periapical dental disease. Grossly normal orbits. The  mastoid air cells are clear.    CTA studies:  There is slight linear streaky hyperdensity and punctate ill-defined  contrast blush off a small branch of the right distal sphenopalatine  artery, as a branch enters the right posterior ethmoidal sinus (series  4 image [id number]). The punctate blush of contrast is specifically on  image 335. Additional questionable small blush extravasation foci  originating from likely small branches of the very distal right  sphenopalatine artery.    Head CTA demonstrates no aneurysm or significant stenosis of the major  intracranial arteries. Left fetal PCA. Mild bilateral ICA carotid  siphon calcification.    Neck CTA demonstrates no stenosis of the major cervical arteries.  Normal great vessel arch origin anatomy, with  mild-to-moderate  scattered atherosclerotic calcification. The normal distal right  internal carotid artery measures 5 mm. The normal distal left internal  carotid artery measures 5 mm. Moderate calcified plaque at the left  carotid bifurcation without significant luminal narrowing. Mild  calcified plaque at the right carotid bifurcation without significant  luminal narrowing.     No mass is noted within the visualized portions of the cervical soft  tissues or lung apices. Numerous presumed reactive bilateral  perijugular and lower cervical, and superior mediastinal lymph nodes.      Impression    Impression:    1. Contrast extravasation/evidence of active bleeding from small  branches off of the right sphenopalatine artery, originating at the  entrance into the posterior lateral right ethmoidal sinus, detailed  above. Grossly stable LeFort fractures without new facial fracture  identified. Blood and possible superimposed mucus within the paranasal  sinuses, notably complete opacification of the right maxillary and  ethmoidal sinuses.    2. Otherwise unremarkable CTA of the head and neck. Left greater than  right mild-to-moderate atherosclerotic plaque at the carotid bulbs  without significant luminal narrowing.    3. Noncontrast head CT demonstrates no acute intracranial pathology.    4. Reactive bilateral perijugular, lower cervical, and superior  mediastinal lymph nodes.    [Urgent Result: Right sphenopalatine artery branch active bleed]    Finding was identified on 12/21/2023 3:04 PM.     Dr. Munoz was contacted by Dr. Foley at 12/21/2023 3:49 PM and  verbalized understanding of the urgent finding.     I have personally reviewed the examination and initial interpretation  and I agree with the findings.    ESTRADA STAPLES MD         SYSTEM ID:  [id number]       Time Spent on this Encounter   I, Silvia Rosario PA-C, personally saw the patient today and spent greater than 30 minutes discharging this patient.    We  appreciate the opportunity to care for your patient while in the hospital.  Should you have any questions about their injuries or this discharge summary our contact information is below.    Trauma Service   [facility]   [address] [place], [place] [address] [phone]

## 2023-12-22 NOTE — PLAN OF CARE
Arrived from: ED  Belongings/meds: Remains with pateint, no meds   2 RN Skin Assessment Completed by: Sohail Sharpe  Non-intact findings documented (yes/no/NA): Bruising under both eyes, scab to R knee. R nostril packing per ENT      Status: Pt admitted for continued epistaxis s/p a fall that resulted in Le[place] I fracture and nasal bone fractures. PMH of HLD, D2M, HTN, PE on Eliquis. 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 On Nasal precaution   Bowel status: LBM 12/20  : Voiding spontaneously   Skin: Bruising under both eyes, scab to R knee. R nostril packing per ENT   Pain: Facial pain managed with PRN Tylenol   Activity: SBA, GB   Social: Family/friends visited   Plan: ENT, OMFS and Hematology consulted. Continue to monitor and follow POC

## 2023-12-22 NOTE — PLAN OF CARE
OT: after chart review and conversation with interdisciplinary team it is concluded that this pt has no acute OT needs. Pt up in room I and with no signs of cognitive deficits. Fall was noted to be syncopal in nature after a coughing bout from pt report. Plan for DISCH to home at this time per medical team, Defer acute OT at this time.

## 2023-12-22 NOTE — PLAN OF CARE
Physical Therapy defer - Orders received, chart reviewed, discussed with care team. No IP PT needs indicated. Per providers, fall related to a syncopal event after coughing bout. No mobility concerns at this time. Pt planning to discharge home today. Will complete consult and defer evaluation, please reconsult as appropriate if patient has decline in functional mobility requiring further skilled inpatient PT needs. Defer Discharge recommendations to medical team.

## 2023-12-22 NOTE — CONSULTS
"Hematology Consult Note    HISTORY  78-year old gentleman who recently sustained a fall with facial fractures followed by significant epistaxis.  He was admitted for this and also found to be COVID+ with chest imaging also incidentally showing pulmonary embolism.  Of note he denies any dyspnea or chest pain at that time.  He was started on apixaban for the pulmonary embolism on 12/17/2023.  After hospital discharge he went home but on 12/21/2023 he had an episode of recurrent epistaxis which was significant and did not stop so he returned to the hospital.  ENT packed the nose and it has stopped bleeding.    He does report that his stool has been dark (since after the first nosebleed but before starting Eliquis) because he is \"swallowing blood\" but denies any other bleeding such as hematuria, dental bleeding, hematemesis.  He denies any prior history of bleeding or any history of prior thrombotic events.  He denies any family history of bleeding or thrombotic events.  He has had surgery in the past (wrist surgery and wisdom tooth extraction) without bleeding or thrombotic complications.    LABORATORY   Review of pertinent results as follows:  --Hemoglobin 11.6 which is slightly decreased from prior  --Normal platelet count of 288  --INR slightly elevated 1.19 on admission likely reflecting apixaban effect and now normal on 12/22/2023 after holding apixaban  --Normal renal function    IMPRESSION  Based on the available history, my independent interpretation of the laboratory test results and discussion with the patient, I believe that he has experienced (1) epistaxis which was exacerbated by anticoagulation but has now resolved with ENT intervention and (2) pulmonary embolism provoked in the setting of COVID and fall/injury and meriting a 3-month course of anticoagulation for a provoked VTE event.    RECOMMENDATIONS  --Restart apixaban when ENT team feels this is reasonable from their perspective with regards to risk " of epistaxis  --If ENT feels apixaban can be restarted within 1-2 days, then would not make further interventions (although would monitor inpatient for 24 hours after restarting apixaban to make sure no recurrent bleeding events)  --If ENT does not feel apixaban can be restarted for a longer period of time then would have to consider intervention such as IVC filter (with imaging of lower extremities first to exclude baseline DVT)     Overall, I spent 60 minutes today (DOS) face-to-face and/or coordinating care as documented above and specifically listed as below:  --Reviewing documentation related to patient's history and this current admission available in EPIC   --Review and clinical interpretation of pertinent laboratory test results and radiology reports from this admission and the recent prior admission  --Discussion of the patient's case with the members of the Hematology Consult Team  --Discussion with the patient   --Discussion with members of the patient's other care teams  --Documentation in the electronic health record    Shayy Harris MD  Date of Service: 12/22/2023

## 2023-12-23 ENCOUNTER — HOSPITAL ENCOUNTER (OUTPATIENT)
Facility: CLINIC | Age: 78
Setting detail: OBSERVATION
Discharge: HOME | End: 2023-12-25
Attending: EMERGENCY MEDICINE | Admitting: RADIOLOGY
Payer: MEDICARE

## 2023-12-23 DIAGNOSIS — R55 VASOVAGAL NEAR SYNCOPE: ICD-10-CM

## 2023-12-23 DIAGNOSIS — R04.0 ACUTE POSTERIOR EPISTAXIS: ICD-10-CM

## 2023-12-23 DIAGNOSIS — R04.0 EPISTAXIS: Primary | ICD-10-CM

## 2023-12-23 LAB
ANION GAP SERPL CALCULATED.3IONS-SCNC: 11 MMOL/L (ref 7–15)
ATRIAL RATE - MUSE: 58 BPM
BASOPHILS # BLD AUTO: 0 10E3/UL (ref 0–0.2)
BASOPHILS NFR BLD AUTO: 0 %
BUN SERPL-MCNC: 15.2 MG/DL (ref 8–23)
CALCIUM SERPL-MCNC: 8.5 MG/DL (ref 8.8–10.2)
CHLORIDE SERPL-SCNC: 101 MMOL/L (ref 98–107)
CREAT SERPL-MCNC: 0.9 MG/DL (ref 0.67–1.17)
DEPRECATED HCO3 PLAS-SCNC: 26 MMOL/L (ref 22–29)
DIASTOLIC BLOOD PRESSURE - MUSE: NORMAL MMHG
EGFRCR SERPLBLD CKD-EPI 2021: 89 ML/MIN/1.73M2
EOSINOPHIL # BLD AUTO: 0.3 10E3/UL (ref 0–0.7)
EOSINOPHIL NFR BLD AUTO: 3 %
ERYTHROCYTE [DISTWIDTH] IN BLOOD BY AUTOMATED COUNT: 14.1 % (ref 10–15)
GLUCOSE BLDC GLUCOMTR-MCNC: 137 MG/DL (ref 70–99)
GLUCOSE SERPL-MCNC: 141 MG/DL (ref 70–99)
HCT VFR BLD AUTO: 34.6 % (ref 40–53)
HGB BLD-MCNC: 11.1 G/DL (ref 13.3–17.7)
HOLD SPECIMEN: NORMAL
IMM GRANULOCYTES # BLD: 0 10E3/UL
IMM GRANULOCYTES NFR BLD: 0 %
INTERPRETATION ECG - MUSE: NORMAL
LYMPHOCYTES # BLD AUTO: 2.5 10E3/UL (ref 0.8–5.3)
LYMPHOCYTES NFR BLD AUTO: 24 %
MCH RBC QN AUTO: 29.7 PG (ref 26.5–33)
MCHC RBC AUTO-ENTMCNC: 32.1 G/DL (ref 31.5–36.5)
MCV RBC AUTO: 93 FL (ref 78–100)
MONOCYTES # BLD AUTO: 0.9 10E3/UL (ref 0–1.3)
MONOCYTES NFR BLD AUTO: 9 %
NEUTROPHILS # BLD AUTO: 6.8 10E3/UL (ref 1.6–8.3)
NEUTROPHILS NFR BLD AUTO: 64 %
NRBC # BLD AUTO: 0 10E3/UL
NRBC BLD AUTO-RTO: 0 /100
P AXIS - MUSE: 69 DEGREES
PLATELET # BLD AUTO: 327 10E3/UL (ref 150–450)
POTASSIUM SERPL-SCNC: 4 MMOL/L (ref 3.4–5.3)
PR INTERVAL - MUSE: 214 MS
QRS DURATION - MUSE: 118 MS
QT - MUSE: 444 MS
QTC - MUSE: 435 MS
R AXIS - MUSE: -60 DEGREES
RBC # BLD AUTO: 3.74 10E6/UL (ref 4.4–5.9)
SODIUM SERPL-SCNC: 138 MMOL/L (ref 135–145)
SYSTOLIC BLOOD PRESSURE - MUSE: NORMAL MMHG
T AXIS - MUSE: 75 DEGREES
VENTRICULAR RATE- MUSE: 58 BPM
WBC # BLD AUTO: 10.6 10E3/UL (ref 4–11)

## 2023-12-23 PROCEDURE — 96361 HYDRATE IV INFUSION ADD-ON: CPT

## 2023-12-23 PROCEDURE — 99222 1ST HOSP IP/OBS MODERATE 55: CPT | Mod: AI | Performed by: INTERNAL MEDICINE

## 2023-12-23 PROCEDURE — 80048 BASIC METABOLIC PNL TOTAL CA: CPT | Performed by: EMERGENCY MEDICINE

## 2023-12-23 PROCEDURE — G0378 HOSPITAL OBSERVATION PER HR: HCPCS

## 2023-12-23 PROCEDURE — 258N000003 HC RX IP 258 OP 636: Performed by: EMERGENCY MEDICINE

## 2023-12-23 PROCEDURE — 82962 GLUCOSE BLOOD TEST: CPT

## 2023-12-23 PROCEDURE — 85025 COMPLETE CBC W/AUTO DIFF WBC: CPT | Performed by: EMERGENCY MEDICINE

## 2023-12-23 PROCEDURE — 36415 COLL VENOUS BLD VENIPUNCTURE: CPT | Performed by: EMERGENCY MEDICINE

## 2023-12-23 PROCEDURE — 250N000011 HC RX IP 250 OP 636: Performed by: INTERNAL MEDICINE

## 2023-12-23 PROCEDURE — 99285 EMERGENCY DEPT VISIT HI MDM: CPT | Mod: 25

## 2023-12-23 PROCEDURE — 96374 THER/PROPH/DIAG INJ IV PUSH: CPT

## 2023-12-23 PROCEDURE — 93005 ELECTROCARDIOGRAM TRACING: CPT

## 2023-12-23 RX ORDER — ACETAMINOPHEN 325 MG/1
650 TABLET ORAL EVERY 4 HOURS PRN
Status: DISCONTINUED | OUTPATIENT
Start: 2023-12-23 | End: 2023-12-24

## 2023-12-23 RX ORDER — SODIUM CHLORIDE, SODIUM LACTATE, POTASSIUM CHLORIDE, CALCIUM CHLORIDE 600; 310; 30; 20 MG/100ML; MG/100ML; MG/100ML; MG/100ML
INJECTION, SOLUTION INTRAVENOUS CONTINUOUS
Status: DISCONTINUED | OUTPATIENT
Start: 2023-12-23 | End: 2023-12-25 | Stop reason: HOSPADM

## 2023-12-23 RX ORDER — NICOTINE POLACRILEX 4 MG
15-30 LOZENGE BUCCAL
Status: DISCONTINUED | OUTPATIENT
Start: 2023-12-23 | End: 2023-12-25

## 2023-12-23 RX ORDER — ACETAMINOPHEN 650 MG/1
650 SUPPOSITORY RECTAL EVERY 4 HOURS PRN
Status: DISCONTINUED | OUTPATIENT
Start: 2023-12-23 | End: 2023-12-24

## 2023-12-23 RX ORDER — DEXTROSE MONOHYDRATE 25 G/50ML
25-50 INJECTION, SOLUTION INTRAVENOUS
Status: DISCONTINUED | OUTPATIENT
Start: 2023-12-23 | End: 2023-12-25

## 2023-12-23 RX ORDER — HYDROMORPHONE HCL IN WATER/PF 6 MG/30 ML
0.2 PATIENT CONTROLLED ANALGESIA SYRINGE INTRAVENOUS
Status: DISCONTINUED | OUTPATIENT
Start: 2023-12-23 | End: 2023-12-25 | Stop reason: HOSPADM

## 2023-12-23 RX ADMIN — HYDROMORPHONE HYDROCHLORIDE 0.2 MG: 0.2 INJECTION, SOLUTION INTRAMUSCULAR; INTRAVENOUS; SUBCUTANEOUS at 23:49

## 2023-12-23 RX ADMIN — SODIUM CHLORIDE 1000 ML: 9 INJECTION, SOLUTION INTRAVENOUS at 21:22

## 2023-12-23 ASSESSMENT — ACTIVITIES OF DAILY LIVING (ADL)
ADLS_ACUITY_SCORE: 35

## 2023-12-23 NOTE — ED TRIAGE NOTES
Nasal rocket in a week ago and started bleeding about half hour ago. Pt states is a small artery that is bleeding per MD.      Triage Assessment (Adult)       Row Name 12/23/23 1602          Triage Assessment    Airway WDL WDL        Respiratory WDL    Respiratory WDL WDL        Skin Circulation/Temperature WDL    Skin Circulation/Temperature WDL X  eyes ecchymotic, nasal rocket in and bleeding around it started at 1530        Cardiac WDL    Cardiac WDL WDL        Peripheral/Neurovascular WDL    Peripheral Neurovascular WDL WDL

## 2023-12-24 ENCOUNTER — APPOINTMENT (OUTPATIENT)
Dept: ULTRASOUND IMAGING | Facility: CLINIC | Age: 78
End: 2023-12-24
Attending: INTERNAL MEDICINE
Payer: MEDICARE

## 2023-12-24 ENCOUNTER — APPOINTMENT (OUTPATIENT)
Dept: INTERVENTIONAL RADIOLOGY/VASCULAR | Facility: CLINIC | Age: 78
End: 2023-12-24
Attending: RADIOLOGY
Payer: MEDICARE

## 2023-12-24 LAB
ANION GAP SERPL CALCULATED.3IONS-SCNC: 10 MMOL/L (ref 7–15)
BUN SERPL-MCNC: 12.2 MG/DL (ref 8–23)
CALCIUM SERPL-MCNC: 8.1 MG/DL (ref 8.8–10.2)
CHLORIDE SERPL-SCNC: 103 MMOL/L (ref 98–107)
CREAT SERPL-MCNC: 0.81 MG/DL (ref 0.67–1.17)
DEPRECATED HCO3 PLAS-SCNC: 26 MMOL/L (ref 22–29)
EGFRCR SERPLBLD CKD-EPI 2021: >90 ML/MIN/1.73M2
ERYTHROCYTE [DISTWIDTH] IN BLOOD BY AUTOMATED COUNT: 13.8 % (ref 10–15)
GLUCOSE BLDC GLUCOMTR-MCNC: 105 MG/DL (ref 70–99)
GLUCOSE BLDC GLUCOMTR-MCNC: 107 MG/DL (ref 70–99)
GLUCOSE BLDC GLUCOMTR-MCNC: 118 MG/DL (ref 70–99)
GLUCOSE BLDC GLUCOMTR-MCNC: 138 MG/DL (ref 70–99)
GLUCOSE SERPL-MCNC: 103 MG/DL (ref 70–99)
HCT VFR BLD AUTO: 33 % (ref 40–53)
HGB BLD-MCNC: 10.6 G/DL (ref 13.3–17.7)
HGB BLD-MCNC: 10.7 G/DL (ref 13.3–17.7)
MCH RBC QN AUTO: 29.7 PG (ref 26.5–33)
MCHC RBC AUTO-ENTMCNC: 32.1 G/DL (ref 31.5–36.5)
MCV RBC AUTO: 92 FL (ref 78–100)
PLATELET # BLD AUTO: 319 10E3/UL (ref 150–450)
POTASSIUM SERPL-SCNC: 3.9 MMOL/L (ref 3.4–5.3)
RBC # BLD AUTO: 3.57 10E6/UL (ref 4.4–5.9)
SODIUM SERPL-SCNC: 139 MMOL/L (ref 135–145)
WBC # BLD AUTO: 8.1 10E3/UL (ref 4–11)

## 2023-12-24 PROCEDURE — C1889 IMPLANT/INSERT DEVICE, NOC: HCPCS

## 2023-12-24 PROCEDURE — 99152 MOD SED SAME PHYS/QHP 5/>YRS: CPT

## 2023-12-24 PROCEDURE — 93970 EXTREMITY STUDY: CPT

## 2023-12-24 PROCEDURE — 272N000192 HC ACCESSORY CR2

## 2023-12-24 PROCEDURE — 272N000196 HC ACCESSORY CR5

## 2023-12-24 PROCEDURE — C1887 CATHETER, GUIDING: HCPCS

## 2023-12-24 PROCEDURE — 99207 PR APP CREDIT; MD BILLING SHARED VISIT: CPT | Performed by: NURSE PRACTITIONER

## 2023-12-24 PROCEDURE — 250N000009 HC RX 250: Performed by: RADIOLOGY

## 2023-12-24 PROCEDURE — 258N000003 HC RX IP 258 OP 636: Performed by: RADIOLOGY

## 2023-12-24 PROCEDURE — 85018 HEMOGLOBIN: CPT | Mod: 91 | Performed by: HOSPITALIST

## 2023-12-24 PROCEDURE — 250N000011 HC RX IP 250 OP 636: Performed by: INTERNAL MEDICINE

## 2023-12-24 PROCEDURE — 36415 COLL VENOUS BLD VENIPUNCTURE: CPT | Performed by: INTERNAL MEDICINE

## 2023-12-24 PROCEDURE — 250N000011 HC RX IP 250 OP 636: Performed by: RADIOLOGY

## 2023-12-24 PROCEDURE — 258N000003 HC RX IP 258 OP 636: Performed by: INTERNAL MEDICINE

## 2023-12-24 PROCEDURE — 36415 COLL VENOUS BLD VENIPUNCTURE: CPT | Performed by: HOSPITALIST

## 2023-12-24 PROCEDURE — 250N000013 HC RX MED GY IP 250 OP 250 PS 637: Performed by: INTERNAL MEDICINE

## 2023-12-24 PROCEDURE — C1769 GUIDE WIRE: HCPCS

## 2023-12-24 PROCEDURE — 272N000609 HC CATH NEURO CR17

## 2023-12-24 PROCEDURE — 250N000012 HC RX MED GY IP 250 OP 636 PS 637: Performed by: INTERNAL MEDICINE

## 2023-12-24 PROCEDURE — 82962 GLUCOSE BLOOD TEST: CPT

## 2023-12-24 PROCEDURE — 272N000567 HC SHEATH CR4

## 2023-12-24 PROCEDURE — C1760 CLOSURE DEV, VASC: HCPCS

## 2023-12-24 PROCEDURE — 255N000002 HC RX 255 OP 636: Performed by: RADIOLOGY

## 2023-12-24 PROCEDURE — G0378 HOSPITAL OBSERVATION PER HR: HCPCS

## 2023-12-24 PROCEDURE — 85027 COMPLETE CBC AUTOMATED: CPT | Performed by: INTERNAL MEDICINE

## 2023-12-24 PROCEDURE — 82374 ASSAY BLOOD CARBON DIOXIDE: CPT | Performed by: INTERNAL MEDICINE

## 2023-12-24 PROCEDURE — 61624 TCAT PERM OCCLS/EMBOLJ CNS: CPT

## 2023-12-24 PROCEDURE — 99232 SBSQ HOSP IP/OBS MODERATE 35: CPT | Performed by: HOSPITALIST

## 2023-12-24 RX ORDER — INSULIN GLARGINE 100 [IU]/ML
65 INJECTION, SOLUTION SUBCUTANEOUS 2 TIMES DAILY
COMMUNITY

## 2023-12-24 RX ORDER — NALOXONE HYDROCHLORIDE 0.4 MG/ML
0.4 INJECTION, SOLUTION INTRAMUSCULAR; INTRAVENOUS; SUBCUTANEOUS
Status: DISCONTINUED | OUTPATIENT
Start: 2023-12-24 | End: 2023-12-25

## 2023-12-24 RX ORDER — ONDANSETRON 4 MG/1
4 TABLET, ORALLY DISINTEGRATING ORAL EVERY 6 HOURS PRN
Status: DISCONTINUED | OUTPATIENT
Start: 2023-12-24 | End: 2023-12-25 | Stop reason: HOSPADM

## 2023-12-24 RX ORDER — ONDANSETRON 4 MG/1
4 TABLET, ORALLY DISINTEGRATING ORAL EVERY 6 HOURS PRN
Status: DISCONTINUED | OUTPATIENT
Start: 2023-12-24 | End: 2023-12-24

## 2023-12-24 RX ORDER — ACETAMINOPHEN 325 MG/1
650 TABLET ORAL EVERY 4 HOURS PRN
Status: DISCONTINUED | OUTPATIENT
Start: 2023-12-24 | End: 2023-12-25 | Stop reason: HOSPADM

## 2023-12-24 RX ORDER — NALOXONE HYDROCHLORIDE 0.4 MG/ML
0.4 INJECTION, SOLUTION INTRAMUSCULAR; INTRAVENOUS; SUBCUTANEOUS
Status: DISCONTINUED | OUTPATIENT
Start: 2023-12-24 | End: 2023-12-24 | Stop reason: HOSPADM

## 2023-12-24 RX ORDER — ONDANSETRON 2 MG/ML
4 INJECTION INTRAMUSCULAR; INTRAVENOUS
Status: DISCONTINUED | OUTPATIENT
Start: 2023-12-24 | End: 2023-12-24

## 2023-12-24 RX ORDER — FENTANYL CITRATE 50 UG/ML
25-50 INJECTION, SOLUTION INTRAMUSCULAR; INTRAVENOUS EVERY 5 MIN PRN
Status: DISCONTINUED | OUTPATIENT
Start: 2023-12-24 | End: 2023-12-25

## 2023-12-24 RX ORDER — NALOXONE HYDROCHLORIDE 0.4 MG/ML
0.2 INJECTION, SOLUTION INTRAMUSCULAR; INTRAVENOUS; SUBCUTANEOUS
Status: DISCONTINUED | OUTPATIENT
Start: 2023-12-24 | End: 2023-12-25 | Stop reason: HOSPADM

## 2023-12-24 RX ORDER — PROCHLORPERAZINE 25 MG
12.5 SUPPOSITORY, RECTAL RECTAL EVERY 12 HOURS PRN
Status: DISCONTINUED | OUTPATIENT
Start: 2023-12-24 | End: 2023-12-24

## 2023-12-24 RX ORDER — DEXTROSE MONOHYDRATE 25 G/50ML
25-50 INJECTION, SOLUTION INTRAVENOUS
Status: ACTIVE | OUTPATIENT
Start: 2023-12-24

## 2023-12-24 RX ORDER — IOPAMIDOL 612 MG/ML
100 INJECTION, SOLUTION INTRAVASCULAR ONCE
Status: COMPLETED | OUTPATIENT
Start: 2023-12-24 | End: 2023-12-24

## 2023-12-24 RX ORDER — NALOXONE HYDROCHLORIDE 0.4 MG/ML
0.4 INJECTION, SOLUTION INTRAMUSCULAR; INTRAVENOUS; SUBCUTANEOUS
Status: DISCONTINUED | OUTPATIENT
Start: 2023-12-24 | End: 2023-12-25 | Stop reason: HOSPADM

## 2023-12-24 RX ORDER — ONDANSETRON 2 MG/ML
4 INJECTION INTRAMUSCULAR; INTRAVENOUS EVERY 6 HOURS PRN
Status: CANCELLED | OUTPATIENT
Start: 2023-12-24

## 2023-12-24 RX ORDER — METOPROLOL SUCCINATE 50 MG/1
50 TABLET, EXTENDED RELEASE ORAL DAILY
Status: DISCONTINUED | OUTPATIENT
Start: 2023-12-24 | End: 2023-12-25 | Stop reason: HOSPADM

## 2023-12-24 RX ORDER — SODIUM CHLORIDE 9 MG/ML
INJECTION, SOLUTION INTRAVENOUS CONTINUOUS
Status: CANCELLED | OUTPATIENT
Start: 2023-12-24

## 2023-12-24 RX ORDER — FLUMAZENIL 0.1 MG/ML
0.2 INJECTION, SOLUTION INTRAVENOUS
Status: DISCONTINUED | OUTPATIENT
Start: 2023-12-24 | End: 2023-12-24 | Stop reason: HOSPADM

## 2023-12-24 RX ORDER — GLIPIZIDE 10 MG/1
10 TABLET, FILM COATED, EXTENDED RELEASE ORAL 2 TIMES DAILY
COMMUNITY

## 2023-12-24 RX ORDER — ONDANSETRON 2 MG/ML
4 INJECTION INTRAMUSCULAR; INTRAVENOUS
Status: DISCONTINUED | OUTPATIENT
Start: 2023-12-24 | End: 2023-12-24 | Stop reason: HOSPADM

## 2023-12-24 RX ORDER — ONDANSETRON 2 MG/ML
4 INJECTION INTRAMUSCULAR; INTRAVENOUS EVERY 6 HOURS PRN
Status: DISCONTINUED | OUTPATIENT
Start: 2023-12-24 | End: 2023-12-25 | Stop reason: HOSPADM

## 2023-12-24 RX ORDER — ONDANSETRON 4 MG/1
4 TABLET, ORALLY DISINTEGRATING ORAL EVERY 6 HOURS PRN
Status: CANCELLED | OUTPATIENT
Start: 2023-12-24

## 2023-12-24 RX ORDER — HYDRALAZINE HYDROCHLORIDE 20 MG/ML
10 INJECTION INTRAMUSCULAR; INTRAVENOUS EVERY 4 HOURS PRN
Status: DISCONTINUED | OUTPATIENT
Start: 2023-12-24 | End: 2023-12-25 | Stop reason: HOSPADM

## 2023-12-24 RX ORDER — PROCHLORPERAZINE 25 MG
12.5 SUPPOSITORY, RECTAL RECTAL EVERY 12 HOURS PRN
Status: DISCONTINUED | OUTPATIENT
Start: 2023-12-24 | End: 2023-12-25 | Stop reason: HOSPADM

## 2023-12-24 RX ORDER — CEPHALEXIN 500 MG/1
500 CAPSULE ORAL EVERY 12 HOURS
Status: DISCONTINUED | OUTPATIENT
Start: 2023-12-24 | End: 2023-12-25

## 2023-12-24 RX ORDER — FLUMAZENIL 0.1 MG/ML
0.2 INJECTION, SOLUTION INTRAVENOUS
Status: DISCONTINUED | OUTPATIENT
Start: 2023-12-24 | End: 2023-12-25

## 2023-12-24 RX ORDER — NALOXONE HYDROCHLORIDE 0.4 MG/ML
0.2 INJECTION, SOLUTION INTRAMUSCULAR; INTRAVENOUS; SUBCUTANEOUS
Status: DISCONTINUED | OUTPATIENT
Start: 2023-12-24 | End: 2023-12-25

## 2023-12-24 RX ORDER — PROCHLORPERAZINE 25 MG
12.5 SUPPOSITORY, RECTAL RECTAL EVERY 12 HOURS PRN
Status: CANCELLED | OUTPATIENT
Start: 2023-12-24

## 2023-12-24 RX ORDER — FENTANYL CITRATE 50 UG/ML
25-50 INJECTION, SOLUTION INTRAMUSCULAR; INTRAVENOUS EVERY 5 MIN PRN
Status: DISCONTINUED | OUTPATIENT
Start: 2023-12-24 | End: 2023-12-24 | Stop reason: HOSPADM

## 2023-12-24 RX ORDER — AMOXICILLIN 250 MG
1 CAPSULE ORAL 2 TIMES DAILY PRN
Status: DISCONTINUED | OUTPATIENT
Start: 2023-12-24 | End: 2023-12-25 | Stop reason: HOSPADM

## 2023-12-24 RX ORDER — NALOXONE HYDROCHLORIDE 0.4 MG/ML
0.2 INJECTION, SOLUTION INTRAMUSCULAR; INTRAVENOUS; SUBCUTANEOUS
Status: DISCONTINUED | OUTPATIENT
Start: 2023-12-24 | End: 2023-12-24 | Stop reason: HOSPADM

## 2023-12-24 RX ORDER — PROCHLORPERAZINE MALEATE 5 MG
5 TABLET ORAL EVERY 6 HOURS PRN
Status: DISCONTINUED | OUTPATIENT
Start: 2023-12-24 | End: 2023-12-25 | Stop reason: HOSPADM

## 2023-12-24 RX ORDER — ACETAMINOPHEN 650 MG/1
650 SUPPOSITORY RECTAL EVERY 4 HOURS PRN
Status: DISCONTINUED | OUTPATIENT
Start: 2023-12-24 | End: 2023-12-25 | Stop reason: HOSPADM

## 2023-12-24 RX ORDER — ONDANSETRON 2 MG/ML
4 INJECTION INTRAMUSCULAR; INTRAVENOUS EVERY 6 HOURS PRN
Status: DISCONTINUED | OUTPATIENT
Start: 2023-12-24 | End: 2023-12-24

## 2023-12-24 RX ORDER — PROCHLORPERAZINE MALEATE 5 MG
5 TABLET ORAL EVERY 6 HOURS PRN
Status: DISCONTINUED | OUTPATIENT
Start: 2023-12-24 | End: 2023-12-24

## 2023-12-24 RX ORDER — SODIUM CHLORIDE 9 MG/ML
INJECTION, SOLUTION INTRAVENOUS CONTINUOUS
Status: DISCONTINUED | OUTPATIENT
Start: 2023-12-24 | End: 2023-12-25 | Stop reason: HOSPADM

## 2023-12-24 RX ORDER — PROCHLORPERAZINE MALEATE 5 MG
5 TABLET ORAL EVERY 6 HOURS PRN
Status: CANCELLED | OUTPATIENT
Start: 2023-12-24

## 2023-12-24 RX ORDER — ACETAMINOPHEN 325 MG/1
650 TABLET ORAL EVERY 4 HOURS PRN
Status: DISCONTINUED | OUTPATIENT
Start: 2023-12-24 | End: 2023-12-24

## 2023-12-24 RX ORDER — NICOTINE POLACRILEX 4 MG
15-30 LOZENGE BUCCAL
Status: ACTIVE | OUTPATIENT
Start: 2023-12-24

## 2023-12-24 RX ORDER — AMOXICILLIN 250 MG
2 CAPSULE ORAL 2 TIMES DAILY PRN
Status: DISCONTINUED | OUTPATIENT
Start: 2023-12-24 | End: 2023-12-25 | Stop reason: HOSPADM

## 2023-12-24 RX ADMIN — FENTANYL CITRATE 25 MCG: 50 INJECTION, SOLUTION INTRAMUSCULAR; INTRAVENOUS at 11:13

## 2023-12-24 RX ADMIN — MIDAZOLAM 1 MG: 1 INJECTION INTRAMUSCULAR; INTRAVENOUS at 11:04

## 2023-12-24 RX ADMIN — IOPAMIDOL 70 ML: 612 INJECTION, SOLUTION INTRAVENOUS at 11:45

## 2023-12-24 RX ADMIN — CEPHALEXIN 500 MG: 500 CAPSULE ORAL at 07:44

## 2023-12-24 RX ADMIN — SODIUM CHLORIDE: 9 INJECTION, SOLUTION INTRAVENOUS at 13:17

## 2023-12-24 RX ADMIN — SODIUM CHLORIDE, POTASSIUM CHLORIDE, SODIUM LACTATE AND CALCIUM CHLORIDE: 600; 310; 30; 20 INJECTION, SOLUTION INTRAVENOUS at 01:25

## 2023-12-24 RX ADMIN — INSULIN GLARGINE 20 UNITS: 100 INJECTION, SOLUTION SUBCUTANEOUS at 01:57

## 2023-12-24 RX ADMIN — METOPROLOL SUCCINATE 50 MG: 50 TABLET, EXTENDED RELEASE ORAL at 07:44

## 2023-12-24 RX ADMIN — Medication 2 SPRAY: at 07:47

## 2023-12-24 RX ADMIN — ACETAMINOPHEN 650 MG: 325 TABLET, FILM COATED ORAL at 21:38

## 2023-12-24 RX ADMIN — MIDAZOLAM 0.5 MG: 1 INJECTION INTRAMUSCULAR; INTRAVENOUS at 11:13

## 2023-12-24 RX ADMIN — MIDAZOLAM 0.5 MG: 1 INJECTION INTRAMUSCULAR; INTRAVENOUS at 11:25

## 2023-12-24 RX ADMIN — CEPHALEXIN 500 MG: 500 CAPSULE ORAL at 21:35

## 2023-12-24 RX ADMIN — ACETAMINOPHEN 650 MG: 325 TABLET, FILM COATED ORAL at 14:26

## 2023-12-24 RX ADMIN — LIDOCAINE HYDROCHLORIDE 10 ML: 10 INJECTION, SOLUTION INFILTRATION; PERINEURAL at 11:05

## 2023-12-24 RX ADMIN — INSULIN GLARGINE 20 UNITS: 100 INJECTION, SOLUTION SUBCUTANEOUS at 21:39

## 2023-12-24 RX ADMIN — FENTANYL CITRATE 50 MCG: 50 INJECTION, SOLUTION INTRAMUSCULAR; INTRAVENOUS at 11:04

## 2023-12-24 RX ADMIN — ACETAMINOPHEN 650 MG: 325 TABLET, FILM COATED ORAL at 01:24

## 2023-12-24 RX ADMIN — ONDANSETRON 4 MG: 2 INJECTION INTRAMUSCULAR; INTRAVENOUS at 10:53

## 2023-12-24 RX ADMIN — FENTANYL CITRATE 25 MCG: 50 INJECTION, SOLUTION INTRAMUSCULAR; INTRAVENOUS at 11:25

## 2023-12-24 ASSESSMENT — ACTIVITIES OF DAILY LIVING (ADL)
ADLS_ACUITY_SCORE: 33
ADLS_ACUITY_SCORE: 35
ADLS_ACUITY_SCORE: 33
ADLS_ACUITY_SCORE: 33
ADLS_ACUITY_SCORE: 35
ADLS_ACUITY_SCORE: 35
ADLS_ACUITY_SCORE: 33
ADLS_ACUITY_SCORE: 33
ADLS_ACUITY_SCORE: 35
ADLS_ACUITY_SCORE: 33
ADLS_ACUITY_SCORE: 35
ADLS_ACUITY_SCORE: 33

## 2023-12-24 NOTE — CONSULTS
78-year-old male seen in consultation for epistaxis.  He has a history of coughing and a fall with facial trauma 1215.  He was noted to have COVID and mildly displaced LeFort I and nasal fractures.  He was started on Eliquis with a history of pulmonary embolism.  He then developed some bleeding this past Thursday and was admitted to the [facility].  He was evaluated by both oral surgery and ENT.  ENT did fiberoptic examination of the nose and placed fibrillar along with Merisel packing to control bleeding.  Plan at that time was to consider embolization if further bleeding.  He did have imaging studies which showed continued oozing from the area of the right sphenopalatine artery.  He then developed further bleeding last evening and was seen through the emergency room and had an Epistat balloon with cessation of bleeding.  He then developed further bleeding this morning which has subsequently resolved.  He does report past history of some hearing loss.  He is not reporting any other current ear nose or throat complaints or problems at this time    Medications:    Tylenol  Eliquis  Lipitor  Keflex  Glucotrol  Aspirin 81 mg  Toprol XL  Roxicodone  Naprosyn     Past Medical History:    Concussion  Diabetes mellitus type 2  Hypercholesterolemia  Hyperlipidemia   Hypertension   Ulcerative colitis  Nuclear sclerotic of both eyes  Erectile dysfunction  Umbilical hernia  Tinnitus      Past Surgical History:    Tonsillectomy   Hernia repair, bilateral   ORIF distal right radial fracture  Excision large soft tissue mass posterior neck      Examination: Pinna unremarkable.  Epistat balloon in place on right without active bleeding.  Oral cavity reveals tonsils to be absent.  There is no active bleeding in the posterior oropharynx    Impression: Posterior epistaxis in anticoagulated patient    Plan: Interventional radiology has been contacted and will plan on embolization at appropriate time.  Following that if there  is no further bleeding we can take the balloon down sometime after 4 to 6 hours.  That may mean later today or potentially tomorrow.  Will have the hospitalist manage his blood sugars.  He is currently n.p.o. for procedure.  Thank you for allowing us to participate in patient's care

## 2023-12-24 NOTE — PHARMACY-ADMISSION MEDICATION HISTORY
Pharmacist Admission Medication History    Admission medication history is complete. The information provided in this note is only as accurate as the sources available at the time of the update.    Information Source(s): Patient via phone    Pertinent Information:   Holding iron supplement as stools still black.  Paxlovid completed ~12/20/23; patient resumed Lipitor 12/24/23.  Patient reports taking aspirin 81 mg daily, last dose 12/24/23. Patient states he was instructed to stop this, so I did not add to his list at this time.  Eliquis 10 mg BID x7 days started 12/17/23. Patient states he hadn't decreased to 5 mg BID yet (last 10 mg dose 12/24/23)    Changes made to PTA medication list:  Added: vitamin D3, vitamin B12, magnesium  Deleted: None  Changed:   Lantus -> Basaglar  Glipizide -> glipizide ER per fill history    Allergies reviewed with patient and updates made in EHR: no    Medication History Completed By: Debbie Pruett [facility] 12/24/2023 2:43 PM    Prior to Admission medications    Medication Sig Last Dose Taking? Auth Provider Long Term End Date   acetaminophen (TYLENOL) 325 MG tablet Take 2 tablets (650 mg) by mouth every 4 hours as needed for mild pain or other (and adjunct with moderate or severe pain or per patient request) prn Yes Shelby Mayberry MD     Apixaban Starter Pack (ELIQUIS DVT/PE STARTER PACK) 5 MG TBPK Take 10 mg by mouth 2 times daily for 7 days, THEN 5 mg 2 times daily for 23 days. 12/23/2023 at am - 10 mg Yes Shelby Mayberry MD  1/16/24   atorvastatin (LIPITOR) 40 MG tablet Take 1 tablet (40 mg) by mouth every evening 12/23/2023 Yes Shelby Mayberry MD Yes    cephALEXin (KEFLEX) 500 MG capsule Take 1 capsule (500 mg) by mouth every 12 hours for 13 doses 12/23/2023 at am Yes Silvia Rosario PA-C  12/29/23   Cholecalciferol (VITAMIN D-3 PO) Take 1 tablet by mouth Every Mon, Wed, Fri Morning  Yes Unknown, Entered By History     Cyanocobalamin (B-12 PO) Take 1 tablet by mouth daily   Yes Unknown, Entered By History     glipiZIDE (GLUCOTROL XL) 10 MG 24 hr tablet Take 10 mg by mouth 2 times daily  at am Yes Unknown, Entered By History     insulin glargine (LANTUS PEN) 100 UNIT/ML pen Inject 65 Units Subcutaneous 2 times daily  Yes Unknown, Entered By History No    MAGNESIUM PO Take 1 tablet by mouth See Admin Instructions Tuesday, Thursday, Saturday alternating with vitamin D  Yes Unknown, Entered By History     metFORMIN (GLUCOPHAGE) 500 MG tablet Take 1,000 mg by mouth 2 times daily (with meals)  Yes Unknown, Entered By History     metoprolol succinate ER (TOPROL XL) 50 MG 24 hr tablet Take 50 mg by mouth daily  Yes Unknown, Entered By History     sodium chloride (OCEAN) 0.65 % nasal spray Spray to both nasal cavities every 2 hours to keep packing moist and will help with removal  Yes Silvia Rosario PA-C     ferrous sulfate (FEROSUL) 325 (65 Fe) MG tablet Take 1 tablet (325 mg) by mouth daily (with breakfast) On Hold  Shelby Mayberry MD

## 2023-12-24 NOTE — ED NOTES
[facility]  ED Nurse Handoff Report    ED Chief complaint: Epistaxis      ED Diagnosis:   Final diagnoses:   Acute posterior epistaxis   Vasovagal near syncope       Code Status: Full Code    Allergies: No Known Allergies    Patient Story: Patient came to ED with nasal bleed. Had a nasal rocket placed a week ago for recurrent epistaxis. During treatment at the ED, had an episode of bradycardic and hypotension but responded to 1L bolus NS and symptoms resolved.   Focused Assessment:  alert and oriented. On room air. Vitals WNL. Walks independently but weak, will need SBA. He refuses Dilaudid for pain, want tylenol. Provider contacted via [employer] for orders    Treatments and/or interventions provided:   Medications   glucose gel 15-30 g (has no administration in time range)     Or   dextrose 50 % injection 25-50 mL (has no administration in time range)     Or   glucagon injection 1 mg (has no administration in time range)   insulin aspart (NovoLOG) injection (RAPID ACTING) (has no administration in time range)   insulin aspart (NovoLOG) injection (RAPID ACTING) (has no administration in time range)   insulin glargine (LANTUS PEN) injection 20 Units (has no administration in time range)   lactated ringers infusion (has no administration in time range)   HYDROmorphone (DILAUDID) injection 0.2 mg (has no administration in time range)   sodium chloride 0.9% BOLUS 1,000 mL (1,000 mLs Intravenous $New Bag 12/23/23 2122)      Results for orders placed or performed during the hospital encounter of 12/23/23   [place] Draw     Status: None    Narrative    The following orders were created for panel order [place] Draw.  Procedure                               Abnormality         Status                     ---------                               -----------         ------                     Extra Blue Top Tube[[id number]]                              Final result               Extra Red Top Tube[[id number]]                                Final result               Extra Green Top (Lithium...[[id number]]                      Final result               Extra Purple Top Tube[[id number]]                            Final result                 Please view results for these tests on the individual orders.   Extra Blue Top Tube     Status: None   Result Value Ref Range    Hold Specimen JIC    Extra Red Top Tube     Status: None   Result Value Ref Range    Hold Specimen JIC    Extra Green Top (Lithium Heparin) Tube     Status: None   Result Value Ref Range    Hold Specimen x    Extra Purple Top Tube     Status: None   Result Value Ref Range    Hold Specimen x    Glucose by meter     Status: Abnormal   Result Value Ref Range    GLUCOSE BY METER POCT 137 (H) 70 - 99 mg/dL   Basic metabolic panel     Status: Abnormal   Result Value Ref Range    Sodium 138 135 - 145 mmol/L    Potassium 4.0 3.4 - 5.3 mmol/L    Chloride 101 98 - 107 mmol/L    Carbon Dioxide (CO2) 26 22 - 29 mmol/L    Anion Gap 11 7 - 15 mmol/L    Urea Nitrogen 15.2 8.0 - 23.0 mg/dL    Creatinine 0.90 0.67 - 1.17 mg/dL    GFR Estimate 89 >60 mL/min/1.73m2    Calcium 8.5 (L) 8.8 - 10.2 mg/dL    Glucose 141 (H) 70 - 99 mg/dL   CBC with platelets and differential     Status: Abnormal   Result Value Ref Range    WBC Count 10.6 4.0 - 11.0 10e3/uL    RBC Count 3.74 (L) 4.40 - 5.90 10e6/uL    Hemoglobin 11.1 (L) 13.3 - 17.7 g/dL    Hematocrit 34.6 (L) 40.0 - 53.0 %    MCV 93 78 - 100 fL    MCH 29.7 26.5 - 33.0 pg    MCHC 32.1 31.5 - 36.5 g/dL    RDW 14.1 10.0 - 15.0 %    Platelet Count 327 150 - 450 10e3/uL    % Neutrophils 64 %    % Lymphocytes 24 %    % Monocytes 9 %    % Eosinophils 3 %    % Basophils 0 %    % Immature Granulocytes 0 %    NRBCs per 100 WBC 0 <1 /100    Absolute Neutrophils 6.8 1.6 - 8.3 10e3/uL    Absolute Lymphocytes 2.5 0.8 - 5.3 10e3/uL    Absolute Monocytes 0.9 0.0 - 1.3 10e3/uL    Absolute Eosinophils 0.3 0.0 - 0.7 10e3/uL    Absolute Basophils 0.0 0.0 - 0.2  10e3/uL    Absolute Immature Granulocytes 0.0 <=0.4 10e3/uL    Absolute NRBCs 0.0 10e3/uL   CBC with platelets + differential     Status: Abnormal    Narrative    The following orders were created for panel order CBC with platelets + differential.  Procedure                               Abnormality         Status                     ---------                               -----------         ------                     CBC with platelets and d...[[id number]]  Abnormal            Final result                 Please view results for these tests on the individual orders.       Patient's response to treatments and/or interventions: Symptoms resolved    To be done/followed up on inpatient unit:  Per inpatient orders    Does this patient have any cognitive concerns?:  None    Activity level - Baseline/Home:  Independent  Activity Level - Current:   Stand with Assist    Patient's Preferred language: English   Needed?: No    Isolation: Contact  and Droplet  Infection: COVID r/o and special precautions  Patient tested for COVID 19 prior to admission: YES  Bariatric?: No    Vital Signs:   Vitals:    12/23/23 2128 12/23/23 2143 12/23/23 2158 12/23/23 2203   BP: 129/64 136/74 129/62    Pulse: 65 66 68 67   Resp: 10 10 (!) 8 19   Temp:       TempSrc:       SpO2: 97% 94% 96% 96%   Weight:       Height:           Cardiac Rhythm:     Was the PSS-3 completed:   Yes  What interventions are required if any?               Family Comments: None  OBS brochure/video discussed/provided to patient/family: No              Name of person given brochure if not patient:               Relationship to patient:     For the majority of the shift this patient's behavior was Green.   Behavioral interventions performed were None.    ED NURSE PHONE NUMBER: [phone]

## 2023-12-24 NOTE — PROGRESS NOTES
Observation goals  PRIOR TO DISCHARGE        Comments:   1. Monitor for bleeding stability and stable hgb overnight- Partially MET  2. Follow up evaluation by ENT- NOT MET

## 2023-12-24 NOTE — PROGRESS NOTES
PRIMARY Concern: Epistaxis, recent fall with facial and nose fractures.   SAFETY RISK Concerns (fall risk, behaviors, etc.): Fall Risk      Isolation/Type: Covid  Tests/Procedures for NEXT shift: BG checks, Hgb q 6hrs  Consults? (Pending/following, signed-off?) ENT following  Where is patient from? (Home, TCU, etc.): Home w/ wife  Other Important info for NEXT shift: Syncope and fall 12/15, resulting in facial fractures.  Anticipated DC date & active delays: Pending progress     SUMMARY NOTE:  Orientation/Cognitive: A&Ox4  Observation Goals (Met/ Not Met): Not Met  Mobility Level/Assist Equipment: SBA w/ IV pole  Antibiotics & Plan (IV/po, length of tx left): Oral Keflex BID  Pain Management: PRN Tylenol   Tele/VS/O2: VSS, on RA  ABNL Lab/BG: Hgb 10.7  Diet: Mod carb  Bowel/Bladder: Continent  Skin Concerns: Facial bruising, device to R nostril. Scab, abrasion to R knee.  B/L foot numbness. R groin incision w/ drsg, CDI  Drains/Devices: PIV infusing NS at 75/hr.  Patient Stated Goal for Today: Rest    Embolization of Right Internal Maxillary artery and Right Facial artery done today via R groin. R groin angio w/ angioseal, ector CDI.

## 2023-12-24 NOTE — CODE/RAPID RESPONSE
[facility]    RRT NOTE  12/24/2023   Time Called: 0627    RRT called for recurrent epistaxis     Code Status: Full Code      Assessment & Plan   Recurrent epistaxis in the setting of LeFort I fx and nasal bone fx; on anticoagulation   I was called to evaluate Shola Lemus, who is a 78 year old male for a recurrent nose bleed. He has a past medical history of diabetes mellitus type 2 and hypertension. He is currently anticoagulated (Eliquis) for pulmonary embolism. Last Friday patient presented to the ED following a coughing fit that resulted in him falling forward. He was admitted from 12/15 to 12/17. At that time rosalina was diagnosed with right middle lobe pulmonary embolism, paroxsymal atrial fibrillation, and Covid-19. Patient was discharged with Eliquis and Paxlovid. Patient on Thursday began to have epistaxis and was admitted to the [facility] from 12/21-12/22; at that time he was found to have bleeding from branches of the right sphenopalatine artery.  He was seen by ENT and had MiraCel placed for 5-7 days.  He was resume on anticoagulation.  He was admitted here on 12/23/2023 when nose bleed recurred, on the right side. He notes that the blood oozed out when he was laying down.      Upon my arrival bedside nurse and flying montalvo RN at bedside; slow trickle of blood in patient's throat; small amount of blood oozing from each nare. VSS, patient hemodynamically stable at this time. Facial ecchymosis present, right nare large packing in place.       INTERVENTIONS:  - ENT paged, discussed with Dr Christine. Per Dr. Christine, can put 1cc of air in each port of epistat if bleeding reoccurs. Patient will require embolization with IR who is aware of patient. Unsure of timing of procedure at this time.       At the conclusion of this RRT patient was hemodynamically stable and will remain on current unit    Discussed with and defer further cares to bedside nursing  staff and ENT.    JAMES Salvador CNP  [facility]  Securely message with the [employer] Web Console (learn more here)  Text page via [employer] Paging/Directory        Physical Exam   Vital Signs with Ranges:  Temp:  [96.8  F (36  C)-98  F (36.7  C)] 98  F (36.7  C)  Pulse:  [49-84] 84  Resp:  [8-20] 18  BP: ()/(35-75) 125/74  SpO2:  [93 %-98 %] 95 %  No intake/output data recorded.    Physical Exam  Constitutional:       General: He is not in acute distress.  HENT:      Nose: Signs of injury present.      Right Nostril: Epistaxis present.      Left Nostril: Epistaxis present.      Comments: facial ecchymosis, right nare large packing  Cardiovascular:      Rate and Rhythm: Normal rate.   Skin:     General: Skin is warm and dry.      Capillary Refill: Capillary refill takes less than 2 seconds.   Neurological:      Mental Status: He is alert.   Psychiatric:         Behavior: Behavior is cooperative.         Data   CBC with Diff:  Recent Labs   Lab Test 12/23/23 2119 12/22/23  0550   WBC 10.6 7.9   HGB 11.1* 11.6*   MCV 93 92    288   INR  --  1.04        Comprehensive Metabolic Panel:  Recent Labs   Lab 12/24/23  0632 12/24/23  0055 12/23/23 2119 12/22/23  0758 12/22/23  0550   NA  --   --  138  --  138   POTASSIUM  --   --  4.0  --  4.1   CHLORIDE  --   --  101  --  103   CO2  --   --  26  --  23   ANIONGAP  --   --  11  --  12   *   < > 141*   < > 95   BUN  --   --  15.2  --  14.2   CR  --   --  0.90  --  0.82   GFRESTIMATED  --   --  89  --  >90   RHONDA  --   --  8.5*  --  8.4*   MAG  --   --   --   --  1.9   PHOS  --   --   --   --  2.7    < > = values in this interval not displayed.       Time Spent on this Encounter     Medical Decision Making       25 MINUTES SPENT BY ME on the date of service doing chart review, history, exam, documentation & further activities per the note.

## 2023-12-24 NOTE — PROCEDURES
Neurointerventional Surgery  Post-procedure     Patient Name: Sohla Lemus  MRN: [id number]  Date of Procedure: December 24, 2023    Procedure: Embolization of Right Internal Maxillary artery and Right Facial artery.    Radiologist: Shola Kaye MD    Contrast: 70 ml Isovue 300  Fluoro Time: 19.2 minutes  Air Kerma: 1021.18 mGy    Medications: Versed 2 mg IV                       Fentanyl 100 mcg IV  Sedation Time: 40 minutes    EBL: 20 ml   Complications: None    Patient reevaluated immediately prior to sedation and prior to procedure.    Preliminary Report:   (See dictation for full detail)      Assess/Plan:  Report to ENT.  Standard post angio orders.    Shola Kaye MD MD  Emergency pager: [phone]  Office: [phone]

## 2023-12-24 NOTE — PROGRESS NOTES
Patient called stating he felt blood running down his throat. Patient opened mouth and a steady stream of blood was running down patients throat.  VSS, RRT called. Patient held pressure to L nostril and slowly the bleeding stopped.  Small amount of blood coming from the Right nostril as well.  ENT paged and connected with Petra PENN NP, [employer] [facility]ANCARLOS.    Per NP if rebleeding occurs, add 1cc of air to each port in the R nostril. Pain is to be expected with this, then page ENT.

## 2023-12-24 NOTE — H&P
[facility]    History and Physical - Hospitalist Service       Date of Admission:  12/23/2023    Assessment & Plan   Shola Lemus is a 78 year old male with history of diabetes mellitus type 2 and hypertension who presents to the ED with epitstaxis. Patient is currently anticoagulated (Eliquis) for pulmonary embolism. Patient last Friday was brought to the ED for a coughing fit that resulted in him falling forward.  He sustained mildly displaced facial fx (LeFort I fx and nasal bone fx). He was seen by OMFS and was to follow up in a few weeks if any cosmetic issues arose. He was admitted from 12/15 to 12/17. He was also diagnosed with right middle lobe pulmonary embolism, and has underlying paroxsymal atrial fibrillation, and Covid-19.    Patient on Thursday began to have epistaxis and was admitted to the [facility] from 12/21-12/22; at that time he was found to have bleeding from branches of the right sphenopalatine artery.  He was seen by ENT and had MiraCel placed for 5-7 days.  He was resume on anticoagulation.  The nose started bleed began again on the right side. He notes that the blood oozed out when he was laying down.     ## Fall secondary to syncope with facial bone fractures  ## Lefort I and nasal bone fracture (non operative)  ## Recurrent Epistaxis in setting of anticoagulation and severe nasal bone fractures.  Rebleeding was tamponaded with epistat in the ER on the right  Hemodynamically stable, hgb stable, admit under observation  ENT to see in AM (Dr Christine)  Recheck Hgb  Keflex 500 mg qid    ## RML segmental and subsegmental PE  This was noted on 12/16 2 admissions ago, no LE US was performed at that time  He was initially on levenox and discharged with eliquis  In setting of rebleed will hold eliquis  Await recommendation of ENT  Will check bilateral LE US to look for residual clot    ## Recent Dx of Covid 19  Patient completed  "paxlovid  No active fevers    ## Afib with RVR  ## Chronic bifasicular block  ## Recent syncope likely related to severe cough  ## HTN  Patient has hx of afib   Recently had RVR episodes   Monitor on telemetry and continue metoprolol    ## DM type II  ## Obesity  Will place on clear liquid diet  Hold glucotrol and metformin  Accu checks with sliding scale insulin  Normally on 65 units of lantus BID, will give 20 units in AM     Diet: clear liquid diet  DVT Prophylaxis: DOAC  Garduno Catheter: Not present  Lines: None     Cardiac Monitoring: None  Code Status:  FULL    Clinically Significant Risk Factors Present on Admission     # Drug Induced Coagulation Defect: home medication list includes an anticoagulant medication        # DMII: A1C = 7.9 % (Ref range: <5.7 %) within past 6 months    # Severe Obesity: Estimated body mass index is 42.77 kg/m  as calculated from the following:    Height as of this encounter: 1.676 m (5' 6\").    Weight as of this encounter: 120.2 kg (265 lb).              Disposition Plan   -- anticipate discharge on 12/24       John Barr MD  Hospitalist Service  [facility]  Securely message with Tiny[employer] (more info)  Text page via [facility] Paging/Directory     ______________________________________________________________________    Chief Complaint   Recurrent epistaxis    History is obtained from the patient, electronic health record, and emergency department physician    History of Present Illness   Shola Lemus is a 78 year old male with history of diabetes mellitus type 2 and hypertension who presents to the ED with epitstaxis. Patient is currently anticoagulated (Eliquis) for pulmonary embolism. Patient last Friday was brought to the ED for a coughing fit that resulted in him falling forward.  He sustained mildly displaced facial fx (LeFort I fx and nasal bone fx). He was seen by OMFS and was to follow up in a few weeks if any cosmetic issues arose. He was " admitted from 12/15 to 12/17. He was also diagnosed with right middle lobe pulmonary embolism, and has underlying paroxsymal atrial fibrillation, and Covid-19. Patient was discharged with Eliquis and Paxlovid, and was feeling okay for a while. Patient on Thursday began to have epistaxis and was admitted to the [facility] from 12/21-12/22; at that time he was found to have bleeding from branches of the right sphenopalatine artery.  He was seen by ENT and had MiraCel placed for 5-7 days.  He was resume on anticoagulation.  The nose started bleed began again on the right side. He notes that the blood oozed out when he was laying down. Patient currently lives with with wife.     In the ER, patient was hemodynamically stable, not tachycardic.  BMP is normal and wbc 10.6, hgb 11.1 and , all stable.  The MiraCels were removed and larger epistat, filled with air brought the bleeding under control.  This was discussed with ENT who will see the patient in the morning.  The patient is being admitted under observation status.      Past Medical History    Past Medical History:   Diagnosis Date    Concussion 20 years ago    Diabetes mellitus (H)     Hypercholesteremia     Hypertension        Past Surgical History   Past Surgical History:   Procedure Laterality Date    HEAD & NECK SURGERY      tonsillectomy    HERNIA REPAIR      bilat       Prior to Admission Medications   Prior to Admission Medications   Prescriptions Last Dose Informant Patient Reported? Taking?   Apixaban Starter Pack (ELIQUIS DVT/PE STARTER PACK) 5 MG TBPK   No No   Sig: Take 10 mg by mouth 2 times daily for 7 days, THEN 5 mg 2 times daily for 23 days.   acetaminophen (TYLENOL) 325 MG tablet   Yes No   Sig: Take 2 tablets (650 mg) by mouth every 4 hours as needed for mild pain or other (and adjunct with moderate or severe pain or per patient request)   atorvastatin (LIPITOR) 40 MG tablet   Yes No   Sig: Take 1 tablet (40 mg)  by mouth every evening   cephALEXin (KEFLEX) 500 MG capsule   No No   Sig: Take 1 capsule (500 mg) by mouth every 12 hours for 13 doses   ferrous sulfate (FEROSUL) 325 (65 Fe) MG tablet   No No   Sig: Take 1 tablet (325 mg) by mouth daily (with breakfast)   glipiZIDE (GLUCOTROL) 10 MG tablet   Yes No   Sig: Take 10 mg by mouth 2 times daily (before meals)   insulin glargine (LANTUS PEN) 100 UNIT/ML pen   Yes No   Sig: Inject 65 Units Subcutaneous 2 times daily   metFORMIN (GLUCOPHAGE) 500 MG tablet   Yes No   Sig: Take 1,000 mg by mouth 2 times daily (with meals)   metoprolol succinate ER (TOPROL XL) 50 MG 24 hr tablet   Yes No   Sig: Take 50 mg by mouth daily   sodium chloride (OCEAN) 0.65 % nasal spray   No No   Sig: Spray to both nasal cavities every 2 hours to keep packing moist and will help with removal      Facility-Administered Medications: None          Physical Exam   Vital Signs: Temp: 97.5  F (36.4  C) Temp src: Oral BP: 129/62 Pulse: 67   Resp: 19 SpO2: 96 % O2 Device: None (Room air)    Weight: 265 lbs 0 oz    General Appearance: NAD, AXOX3  HEENT:  noted facial ecchymosis, right nare large packing  Respiratory: CTA  Cardiovascular: RRR  GI: soft, NT, ND  Skin: warm and dry  Other: Non focal    Medical Decision Making       67 MINUTES SPENT BY ME on the date of service doing chart review, history, exam, documentation & further activities per the note.      Data     I have personally reviewed the following data over the past 24 hrs:    10.6  \   11.1 (L)   / 327     138 101 15.2 /  138 (H)   4.0 26 0.90 \       Imaging results reviewed over the past 24 hrs:   No results found for this or any previous visit (from the past 24 hour(s)).

## 2023-12-24 NOTE — ED PROVIDER NOTES
History     Chief Complaint:  Epistaxis       The history is provided by the patient.      Shola Lemus is a 78 year old male with history of diabetes mellitus type 2 and hypertension who presents to the ED with epitstaxis. Patient is currently anticoagulated (Eliquis) for pulmonary embolism. Patient last Friday was brought to the ED for a coughing fit that resulted in him falling forward. He was admitted from 12/15 to 12/17. He was diagnosed with right middle lobe pulmonary embolism, paroxsymal atrial fibrillation, and Covid-19. Patient was discharged with Eliquis and Paxlovid, feeling okay for a while. Patient on Thursday began to have epistaxis and was admitted to the [facility] from 12/21-12/22; he had rods put in his nose. The nose bleed started on the left side, but today the nose bleed began on the right side. He notes that the blood oozed out when he was laying down. Patient currently lives with with wife.    Independent Historian:   None - Patient Only    Review of External Notes:   [facility] NOTES     Medications:    Tylenol  Eliquis  Lipitor  Keflex  Glucotrol  Aspirin 81 mg  Toprol XL  Roxicodone  Naprosyn    Past Medical History:    Concussion  Diabetes mellitus type 2  Hypercholesterolemia  Hyperlipidemia   Hypertension   Ulcerative colitis  Nuclear sclerotic of both eyes  Erectile dysfunction  Umbilical hernia  Tinnitus     Past Surgical History:    Tonsillectomy   Hernia repair, bilateral   ORIF distal right radial fracture  Excision large soft tissue mass posterior neck    Physical Exam   Patient Vitals for the past 24 hrs:   BP Temp Temp src Pulse Resp SpO2 Height Weight   12/23/23 2203 -- -- -- 67 19 96 % -- --   12/23/23 2158 129/62 -- -- 68 (!) 8 96 % -- --   12/23/23 2143 136/74 -- -- 66 10 94 % -- --   12/23/23 2128 129/64 -- -- 65 10 97 % -- --   12/23/23 2123 132/66 -- -- 64 13 93 % -- --   12/23/23 2118 129/63 -- -- 63 -- 96 % -- --   12/23/23 2113 126/65  "-- -- 66 12 94 % -- --   12/23/23 2108 (!) 59/35 -- -- (!) 49 18 97 % -- --   12/23/23 2103 (!) 88/75 -- -- 51 -- 97 % -- --   12/23/23 2003 120/70 -- -- 75 -- 98 % -- --   12/23/23 2000 120/70 97.5  F (36.4  C) Oral -- 18 97 % -- --   12/23/23 1600 125/61 96.8  F (36  C) Temporal 78 20 94 % 1.676 m (5' 6\") 120.2 kg (265 lb)        Physical Exam  Constitutional: Middle age white male,   HENT: bilateral infraorbital ecchymosis, packing in right nares, oral pharynx bruising, right posterior hard palate   Eyes: EOM are normal. Pupils are equal, round, and reactive to light.   Neck: Normal range of motion. No JVD present. No cervical adenopathy.  Cardiovascular: Regular rhythm.  Exam reveals no gallop and no friction rub.    No murmur heard.  Pulmonary/Chest: Bilateral breath sounds normal. No wheezes, rhonchi or rales.  Abdominal: Soft. No tenderness. No rebound or guarding.   Musculoskeletal: No edema. No tenderness.   Lymphadenopathy: No lymphadenopathy.   Neurological: Alert and oriented to person, place, and time. Normal strength. Coordination normal.   Skin: Skin is warm and dry. No rash noted. No erythema.     Emergency Department Course   ECG  ECG taken at 2109  Sinus brachycardia with 1st degree AV block   Incomplete right bundle branch block   Left anterior fascicular block   Nonspecific ST and T wave abnormality  Rate 58 bpm. IA interval 214 ms. QRS duration 118 ms. QT/QTc 444/435 ms. P-R-T axes 69 -60 75.     Laboratory:  Labs Ordered and Resulted from Time of ED Arrival to Time of ED Departure   GLUCOSE BY METER - Abnormal       Result Value    GLUCOSE BY METER POCT 137 (*)    BASIC METABOLIC PANEL - Abnormal    Sodium 138      Potassium 4.0      Chloride 101      Carbon Dioxide (CO2) 26      Anion Gap 11      Urea Nitrogen 15.2      Creatinine 0.90      GFR Estimate 89      Calcium 8.5 (*)     Glucose 141 (*)    CBC WITH PLATELETS AND DIFFERENTIAL - Abnormal    WBC Count 10.6      RBC Count 3.74 (*)     " Hemoglobin 11.1 (*)     Hematocrit 34.6 (*)     MCV 93      MCH 29.7      MCHC 32.1      RDW 14.1      Platelet Count 327      % Neutrophils 64      % Lymphocytes 24      % Monocytes 9      % Eosinophils 3      % Basophils 0      % Immature Granulocytes 0      NRBCs per 100 WBC 0      Absolute Neutrophils 6.8      Absolute Lymphocytes 2.5      Absolute Monocytes 0.9      Absolute Eosinophils 0.3      Absolute Basophils 0.0      Absolute Immature Granulocytes 0.0      Absolute NRBCs 0.0     GLUCOSE MONITOR NURSING POCT      Emergency Department Course & Assessments:  Interventions:  Medications   glucose gel 15-30 g (has no administration in time range)     Or   dextrose 50 % injection 25-50 mL (has no administration in time range)     Or   glucagon injection 1 mg (has no administration in time range)   insulin aspart (NovoLOG) injection (RAPID ACTING) (has no administration in time range)   insulin aspart (NovoLOG) injection (RAPID ACTING) (has no administration in time range)   insulin glargine (LANTUS PEN) injection 20 Units (has no administration in time range)   lactated ringers infusion (has no administration in time range)   HYDROmorphone (DILAUDID) injection 0.2 mg (has no administration in time range)   sodium chloride 0.9% BOLUS 1,000 mL (1,000 mLs Intravenous $New Bag 23)      Assessments:   I obtained the history and examined the patient as noted above.    Independent Interpretation (X-rays, CTs, rhythm strip):  None    Consultations/Discussion of Management or Tests:   I consulted with Dr. Barr (hospitalist) regarding the patient.      Social Determinants of Health affecting care:   None    Disposition:  The patient was admitted to the hospital under the care of Dr. Barr .     Impression & Plan    Medical Decision Makin year old male comes in with a nose bleed. He has a complicated history. He fell a week ago, smashed his face, and had some nasal fractures. Was also found to  have Covid and small pulmonary embolism Started on Eliquis. Began bleeding a few days layer. Went here and transferred to the [facility] where eventually packing was placed posteriorly, this worked but then again today began bleeding.     PROCEDURE:  POSTERIOR NOSEBLEED TAMPONADE:  On exam here, there was not a lot of initial bleeding. I applied afrin and some lidocaine gel and watched him for quite a while, but he began to bleed again. I then pulled out the packing. Suctioned and tried a 7.5 MiraCel, which did not work. I then tried an Epistat, FILLED up the posterior balloon to 10 with air and the anterior with 15 and the bleeding now is under control.     Unfortunately, he had a vagal episode about 15 minutes after this was done. His oxygen though was good. His labs have been done and his ECG. We will bring him in for observation and discussed with intervention radiology who states they do have the facility here to do an emobolization if needed. I also talked with Dr. Perez who can be consulted if needed as well.      Diagnosis:    ICD-10-CM    1. Acute posterior epistaxis  R04.0       2. Vasovagal near syncope  R55         Scribe Disclosure:  I, Hector Escoto, am serving as a scribe at 9:51 PM on 12/23/2023 to document services personally performed by Natan Bentley MD based on my observations and the provider's statements to me.     12/23/2023   Natan Bentley MD Steinman, Randall Ira, MD  12/24/23 0023

## 2023-12-24 NOTE — PLAN OF CARE
PRIMARY Concern: Epistaxis, recent fall with facial and nose fractures.   SAFETY RISK Concerns (fall risk, behaviors, etc.): Fall Risk      Isolation/Type: Covid  Tests/Procedures for NEXT shift: US anna lower extremity. Labs, BG checks  Consults? (Pending/following, signed-off?) ENT to see  Where is patient from? (Home, TCU, etc.): Home w/ wife  Other Important info for NEXT shift: Syncope and fall 12/15, resulting in facial fractures.  Anticipated DC date & active delays: Pending progress    SUMMARY NOTE:  Orientation/Cognitive: A&Ox4  Observation Goals (Met/ Not Met): Not Met  Mobility Level/Assist Equipment: Asst 1 gb, bedrest ex to BR.  Antibiotics & Plan (IV/po, length of tx left): Oral Keflex BID  Pain Management: C/o headache and face pain, PRN Tylenol x1  Tele/VS/O2: VSS, on RA  ABNL Lab/BG: ; Hgb 11.1  Diet: Clears  Bowel/Bladder: Continent  Skin Concerns: Facial bruising, device to R nostril. Scab, abrasion to R knee.  B/L foot numbness  Drains/Devices: PIV infusing LR at 75/hr.  Patient Stated Goal for Today: Rest  Other: ESTRADA, LS diminished. Sore throat when swallowing.

## 2023-12-24 NOTE — PROGRESS NOTES
\Observation goals  PRIOR TO DISCHARGE       Comments:   1. Monitor for bleeding stability and stable hgb overnight- Partially MET, hgb 10.7  2. Follow up evaluation by ENT: Partially met

## 2023-12-24 NOTE — PROGRESS NOTES
RECEIVING UNIT ED HANDOFF REVIEW    ED Nurse Handoff Report was reviewed by: Nery Chatterjee RN on December 24, 2023 at 12:31 AM

## 2023-12-24 NOTE — IR NOTE
Interventional Radiology Intra-procedural Nursing Note    Patient Name: Shola Lemus  Medical Record Number: [id number]  Today's Date: December 24, 2023    Procedure: Embolization for nosebleed with IV moderate sedation  Start time: 1104  End time: 1143  Report provided to: SALLY Wilson  Patient depart time and location: 1158 to Room 530    Note: Patient entered Interventional Radiology Suite number 3 via cart. Patient awake, alert and oriented. Assisted onto procedural table in supine position. Prepped and draped.  Dr. Kaye in room. Time out and procedure started. Vital signs stable. Telemetry reading SR with first degree AVB and PVC's.    Procedure well tolerated by patient without complications. Procedure end with debrief by Dr. Kaye.  6 Fr angioseal deployed following sheath removal at 1140, and  manual pressure applied until hemostasis achieved at.   Quickclot and tegaderm dressing applied to right interventional procedure access site, dressing is c/d/I.    4 hours bedrest per Dr. Kaye, from [id number].    Administered medication totals:  Lidocaine 1% 10 mL Intradermal  Zofran 4 mg IVP  Versed 2 mg IVP  Fentanyl 100 mcg IVP    Last dose of sedation administered at 1125.

## 2023-12-24 NOTE — PROGRESS NOTES
[facility]    Hospitalist Progress Note      Assessment & Plan   Shola Lemus is a 78 year old male with history of diabetes mellitus type 2, PE on eliquis and hypertension who presents to the ED with epitstaxis.     Patient is currently anticoagulated (Eliquis) for pulmonary embolism. Patient last Friday was brought to the ED for a coughing fit that resulted in him falling forward.  He sustained mildly displaced facial fx (LeFort I fx and nasal bone fx). He was seen by OMFS and was to follow up in a few weeks if any cosmetic issues arose. He was admitted from 12/15 to 12/17. He was also diagnosed with right middle lobe pulmonary embolism, and has underlying paroxsymal atrial fibrillation, and Covid-19.     Patient on Thursday began to have epistaxis and was admitted to the [facility] from 12/21-12/22; at that time he was found to have bleeding from branches of the right sphenopalatine artery.  He was seen by ENT and had MiraCel placed for 5-7 days.  He was resume on anticoagulation.  The nose started bleed began again on the right side. He notes that the blood oozed out when he was laying down.      ## Fall secondary to syncope with facial bone fractures  ## Lefort I and nasal bone fracture (non operative)  ## Recurrent Epistaxis in setting of anticoagulation and severe nasal bone fractures.  Rebleeding was tamponaded with epistat in the ER on the right  hgb stable,   ENT to see in AM (Dr Christine)  Keflex 500 mg qid  RRT called earlier this morning due to resumption of epistaxis with blood down patient's throat and oozing from each nares.  JEANCARLOS spoke with ENT, Dr. Christine and states can tamponade with 1 cc of air if recurrent bleeding otherwise he arranged embolization with IR today.  Patient is made n.p.o. for IR embolization of right internal maxillary artery and right facial artery; done  See note.  Per ENT note after embolization can take balloon down  stating later today or potentially tomorrow, will defer to ENT.  Also defer to ENT when to resume PTA Eliquis and monitoring.  Hemoglobin in AM.     ## RML segmental and subsegmental PE  This was noted on 12/16 2 admissions ago, no LE US was performed at that time  Dscharged with eliquis  In setting of rebleed hold eliquis  Will check bilateral LE US to look for residual clot: NEG  defer to ENT when to resume PTA Eliquis and monitoring.     ## Recent Dx of Covid 19  Patient completed paxlovid  No active fevers  Considered COVID recovered 12/26/2023     ## Afib with RVR  ## Chronic bifasicular block  ## Recent syncope likely related to severe cough  ## HTN  Patient has hx of afib   Recently had RVR episodes   Monitor on telemetry and continue metoprolol  defer to ENT when to resume PTA Eliquis.       ## DM type II  ## Obesity  Will place on clear liquid diet  Hold glucotrol and metformin  Accu checks with sliding scale insulin  Normally on 65 units of lantus BID, will give 20 units in AM   Now postembolization resumption of diabetic diet however glucoses remain soft at 103-107, for now follow glucoses, continue SSI.       DVT Prophylaxis: eliquis on HOLD due to epistaxis  Code Status: Full Code     Expected Discharge Date: pending hemostasis; restart AC, ENT plan established, consider inpatient monitoring 24 hours after restarting apixaban to ensure no recurrent bleeding events.            Glen Whitman MD  Text Page (7am - 6pm, M-F)    Interval History   Recurrent nosebleeds, scheduled for embolectomy later today.  No other bleeding noted.    -Data reviewed today: I reviewed all new labs and imaging results over the last 24 hours.     Physical Exam   Temp: 98  F (36.7  C) Temp src: Oral BP: 127/64 Pulse: 62   Resp: 16 SpO2: 98 % O2 Device: Nasal cannula Oxygen Delivery: 2 LPM  Vitals:    12/23/23 1600 12/24/23 0049   Weight: 120.2 kg (265 lb) 122.6 kg (270 lb 4.5 oz)     General/Constitutional:   NAD, alert,  calm, cooperative    HEENT: nares packing; facial bruising  Chest/Respiratory: Respirations nonlabored room air  Cardiovascular:  regular, no murmur appreciated.  Gastrointestinal/Abdomen:  soft, nontender,  Neuro.  Gross motor tested, nonfocal,  Psych oriented, affect calm      Medications    lactated ringers 75 mL/hr at 12/24/23 0125    sodium chloride 75 mL/hr at 12/24/23 1317      cephALEXin  500 mg Oral Q12H    insulin aspart  1-7 Units Subcutaneous TID AC    insulin aspart  1-5 Units Subcutaneous At Bedtime    insulin glargine  20 Units Subcutaneous BID    metoprolol succinate ER  50 mg Oral Daily       Data   Recent Labs   Lab 12/24/23  1438 12/24/23  1216 12/24/23  0635 12/24/23  0632 12/24/23  0055 12/23/23  2119 12/22/23  0758 12/22/23  0550 12/21/23  1703 12/21/23  1421   WBC  --   --  8.1  --   --  10.6  --  7.9  --   --    HGB 10.7*  --  10.6*  --   --  11.1*  --  11.6*  --   --    MCV  --   --  92  --   --  93  --  92  --   --    PLT  --   --  319  --   --  327  --  288  --   --    INR  --   --   --   --   --   --   --  1.04  --  1.19*   NA  --   --  139  --   --  138  --  138  --   --    POTASSIUM  --   --  3.9  --   --  4.0  --  4.1  --   --    CHLORIDE  --   --  103  --   --  101  --  103  --   --    CO2  --   --  26  --   --  26  --  23  --   --    BUN  --   --  12.2  --   --  15.2  --  14.2  --   --    CR  --   --  0.81  --   --  0.90  --  0.82  --   --    ANIONGAP  --   --  10  --   --  11  --  12  --   --    RHONDA  --   --  8.1*  --   --  8.5*  --  8.4*  --   --    GLC  --  107* 103* 105*   < > 141*   < > 95   < >  --     < > = values in this interval not displayed.       Recent Results (from the past 24 hour(s))   US Lower Extremity Venous Duplex Bilateral    Narrative    EXAM: ULTRASOUND LOWER EXTREMITY VENOUS DUPLEX BILATERAL  LOCATION: [facility]  DATE: 12/24/2023    INDICATION: History of PE, evaluate for lower extremity DVT burden.  COMPARISON: None.  TECHNIQUE:  Venous Duplex ultrasound of bilateral lower extremities with and without compression, augmentation and duplex. Color flow and spectral Doppler with waveform analysis performed.    FINDINGS: Exam includes the common femoral, femoral, popliteal veins as well as segmentally visualized deep calf veins and greater saphenous vein.     RIGHT: No deep vein thrombosis. No superficial thrombophlebitis. No popliteal cyst.    LEFT: No deep vein thrombosis. No superficial thrombophlebitis. No popliteal cyst.      Impression    IMPRESSION:  1.  No deep venous thrombosis in the bilateral lower extremities.

## 2023-12-25 VITALS
DIASTOLIC BLOOD PRESSURE: 56 MMHG | BODY MASS INDEX: 33.61 KG/M2 | RESPIRATION RATE: 18 BRPM | TEMPERATURE: 97.6 F | HEIGHT: 75 IN | OXYGEN SATURATION: 95 % | WEIGHT: 270.28 LBS | SYSTOLIC BLOOD PRESSURE: 120 MMHG | HEART RATE: 75 BPM

## 2023-12-25 LAB
ERYTHROCYTE [DISTWIDTH] IN BLOOD BY AUTOMATED COUNT: 14 % (ref 10–15)
GLUCOSE BLDC GLUCOMTR-MCNC: 139 MG/DL (ref 70–99)
GLUCOSE BLDC GLUCOMTR-MCNC: 162 MG/DL (ref 70–99)
GLUCOSE BLDC GLUCOMTR-MCNC: 205 MG/DL (ref 70–99)
GLUCOSE BLDC GLUCOMTR-MCNC: 216 MG/DL (ref 70–99)
HCT VFR BLD AUTO: 32.8 % (ref 40–53)
HGB BLD-MCNC: 10.4 G/DL (ref 13.3–17.7)
HOLD SPECIMEN: NORMAL
MCH RBC QN AUTO: 29.6 PG (ref 26.5–33)
MCHC RBC AUTO-ENTMCNC: 31.7 G/DL (ref 31.5–36.5)
MCV RBC AUTO: 93 FL (ref 78–100)
PLATELET # BLD AUTO: 316 10E3/UL (ref 150–450)
RBC # BLD AUTO: 3.51 10E6/UL (ref 4.4–5.9)
WBC # BLD AUTO: 8.4 10E3/UL (ref 4–11)

## 2023-12-25 PROCEDURE — 85027 COMPLETE CBC AUTOMATED: CPT | Performed by: HOSPITALIST

## 2023-12-25 PROCEDURE — 99239 HOSP IP/OBS DSCHRG MGMT >30: CPT | Performed by: HOSPITALIST

## 2023-12-25 PROCEDURE — 250N000013 HC RX MED GY IP 250 OP 250 PS 637: Performed by: INTERNAL MEDICINE

## 2023-12-25 PROCEDURE — 82962 GLUCOSE BLOOD TEST: CPT

## 2023-12-25 PROCEDURE — G0378 HOSPITAL OBSERVATION PER HR: HCPCS

## 2023-12-25 PROCEDURE — 36415 COLL VENOUS BLD VENIPUNCTURE: CPT | Performed by: HOSPITALIST

## 2023-12-25 PROCEDURE — 250N000013 HC RX MED GY IP 250 OP 250 PS 637: Performed by: OTOLARYNGOLOGY

## 2023-12-25 RX ORDER — CEPHALEXIN 500 MG/1
500 CAPSULE ORAL EVERY 8 HOURS SCHEDULED
Status: DISCONTINUED | OUTPATIENT
Start: 2023-12-25 | End: 2023-12-25 | Stop reason: HOSPADM

## 2023-12-25 RX ADMIN — CEPHALEXIN 500 MG: 500 CAPSULE ORAL at 08:54

## 2023-12-25 RX ADMIN — INSULIN GLARGINE 20 UNITS: 100 INJECTION, SOLUTION SUBCUTANEOUS at 08:57

## 2023-12-25 RX ADMIN — METOPROLOL SUCCINATE 50 MG: 50 TABLET, EXTENDED RELEASE ORAL at 08:54

## 2023-12-25 RX ADMIN — CEPHALEXIN 500 MG: 500 CAPSULE ORAL at 13:48

## 2023-12-25 ASSESSMENT — ACTIVITIES OF DAILY LIVING (ADL)
ADLS_ACUITY_SCORE: 35
ADLS_ACUITY_SCORE: 33
ADLS_ACUITY_SCORE: 35
ADLS_ACUITY_SCORE: 33

## 2023-12-25 NOTE — PROGRESS NOTES
PRIMARY Concern: Epistaxis, recent fall with facial and nose fractures.   SAFETY RISK Concerns (fall risk, behaviors, etc.): Fall Risk      Isolation/Type: Covid, will be recovered 12/26  Tests/Procedures for NEXT shift: BG checks  Consults? (Pending/following, signed-off?) ENT consulted.  Where is patient from? (Home, TCU, etc.): Home w/ wife  Other Important info for NEXT shift: Syncope and fall 12/15, resulting in facial fractures.  Anticipated DC date & active delays: discharging later today.      A&Ox4. vSS on RA. Up with SBA/Ind. On oral Keflex BID. Denies pain. Mod carb diet. BG- 126. Up to bathroom, continent. R groin incision dressing CDI. CMS intact and neuro's intact. Educated on making follow up appt with PCP. PIV removed. AVS printed and explained. Question answered. No meds changes. Wife coming to give ride.      Observation goals  PRIOR TO DISCHARGE        Comments: 1. Monitor for bleeding stability and stable hgb overnight- no bleeding.   2. Follow up evaluation by ENT- consulted and follow up with outpatient.ENT if further bleed.

## 2023-12-25 NOTE — CONSULTS
Care Management Initial Consult    General Information  Assessment completed with: Patient,    Type of CM/SW Visit: Initial Assessment    Primary Care Provider verified and updated as needed: Yes   Readmission within the last 30 days: unable to assess      Reason for Consult: other (see comments) (e;evated risk score)  Advance Care Planning:            Communication Assessment  Patient's communication style: spoken language (English or Bilingual)    Hearing Difficulty or Deaf: no        Cognitive  Cognitive/Neuro/Behavioral: WDL                      Living Environment:   People in home: spouse     Current living Arrangements: house      Able to return to prior arrangements: yes       Family/Social Support:  Care provided by: self  Provides care for: no one  Marital Status:   Wife          Description of Support System: Supportive, Involved         Current Resources:   Patient receiving home care services: No     Community Resources: None  Equipment currently used at home:    Supplies currently used at home: None    Employment/Financial:  Employment Status:          Financial Concerns:             Does the patient's insurance plan have a 3 day qualifying hospital stay waiver?  No    Lifestyle & Psychosocial Needs:  Social Determinants of Health     Food Insecurity: Not on file   Depression: Not on file   Housing Stability: Not on file   Tobacco Use: Low Risk  (12/24/2023)    Patient History     Smoking Tobacco Use: Never     Smokeless Tobacco Use: Never     Passive Exposure: Not on file   Financial Resource Strain: Not on file   Alcohol Use: Not on file   Transportation Needs: Not on file   Physical Activity: Not on file   Interpersonal Safety: Not on file   Stress: Not on file   Social Connections: Not on file       Functional Status:  Prior to admission patient needed assistance:              Mental Health Status:          Chemical Dependency Status:                Values/Beliefs:  Spiritual, Cultural  Beliefs, Anglican Practices, Values that affect care:                 Additional Information:  Writer spoke  with patient on room phone due to Covid + status.  Writer introduced self and role. Patient shared that he lives with wife and is independent.  Writer explained [facility]  document and patient verbalized understanding.  No needs identified after talking with Shola..     Ashleigh Watt RN

## 2023-12-25 NOTE — PLAN OF CARE
PRIMARY Concern: Epistaxis, recent fall with facial and nose fractures.   SAFETY RISK Concerns (fall risk, behaviors, etc.): Fall Risk      Isolation/Type: Covid, will be recovered 12/26  Tests/Procedures for NEXT shift: BG checks  Consults? (Pending/following, signed-off?) ENT following  Where is patient from? (Home, TCU, etc.): Home w/ wife  Other Important info for NEXT shift: Syncope and fall 12/15, resulting in facial and nasal fractures.  Anticipated DC date & active delays: Likely discharge today if no bleeding reoccurs.     SUMMARY NOTE:  Orientation/Cognitive: A&Ox4  Observation Goals (Met/ Not Met): Not met  Mobility Level/Assist Equipment: SBA  Antibiotics & Plan (IV/po, length of tx left): Oral Keflex BID  Pain Management: PRN Tylenol for face pain and headache  Tele/VS/O2: VSS, on RA  ABNL Lab/BG: Hgb 10.7;  and 162  Diet: Mod carb  Bowel/Bladder: Continent  Skin Concerns: Facial bruising, device to R nostril. Scab, abrasion to R knee.  B/L foot numbness. R groin incision w/ drsg, CDI  Drains/Devices: PIV S.L. Tolerating oral intake. Fluids encouraged.  Patient Stated Goal for Today: Rest  CMS and Neuro intact.             Observation goals  PRIOR TO DISCHARGE        Comments:   1. Monitor for bleeding stability and stable hgb overnight- MET  2. Follow up evaluation by ENT- NOT MET

## 2023-12-25 NOTE — PLAN OF CARE
Observation goals  PRIOR TO DISCHARGE        Comments:   1. Monitor for bleeding stability and stable hgb overnight- MET  2. Follow up evaluation by ENT- NOT MET- trying to be here before noon               Alert and oriented x4. VSS. Denies pain. Up SBA. No signs of bleeding. ENT contacted and will try to be here by noon to remove rhino rocket. Security contacted to look for pts glasses in IR. Plan to likely discharge today.

## 2023-12-25 NOTE — PROGRESS NOTES
78-year-old male seen in follow-up of his posterior epistaxis.  He had embolization done yesterday.  He has not had further bleeding.  He is not complaining of significant facial pain.  He is not reporting any other current ear nose or throat complaints or problems    The exam showed no blood in the posterior oropharynx.  The Epistat balloon was deflated and removed.  There was no further bleeding    Impression: Epistaxis, controlled by embolization    Plan: At this point he may have a soft diet.  I instructed him and limited activity in the upcoming week including no nose blowing.  He should also resume his saline spray for his nose to keep it moist.  I would say no lifting over 10 pounds.  He may restart his Eliquis at 5 mg twice daily.  He may have follow-up with oral and maxillofacial surgery for consideration of treatment of his minimally displaced LeFort I fracture sometime in the upcoming week.  He may have follow-up with ENT pending further bleeding and/or consideration of treatment of his septal deviation which he reports is being longstanding.

## 2023-12-25 NOTE — DISCHARGE SUMMARY
[facility]    Discharge Summary  Hospitalist    Date of Admission:  12/23/2023  Date of Discharge:  12/25/2023    Discharge Diagnoses      Acute posterior epistaxis  Vasovagal near syncope  Epistaxis    History of Present Illness   Shola Lemus is an 78 year old male who presented with epistaxis     Hospital Course   Shola Lemus was admitted on 12/23/2023.  The following problems were addressed during his hospitalization:      Shola Lemus is a 78 year old male with history of diabetes mellitus type 2, PE on eliquis and hypertension who presents to the ED with epitstaxis.      Patient is currently anticoagulated (Eliquis) for pulmonary embolism. Patient last Friday was brought to the ED for a coughing fit that resulted in him falling forward.  He sustained mildly displaced facial fx (LeFort I fx and nasal bone fx). He was seen by OMFS and was to follow up in a few weeks if any cosmetic issues arose. He was admitted from 12/15 to 12/17. He was also diagnosed with right middle lobe pulmonary embolism, and has underlying paroxsymal atrial fibrillation, and Covid-19.      Patient on Thursday began to have epistaxis and was admitted to the [facility] from 12/21-12/22; at that time he was found to have bleeding from branches of the right sphenopalatine artery.  He was seen by ENT and had MiraCel placed for 5-7 days.  He was resume on anticoagulation.  The nose started bleed began again on the right side. He notes that the blood oozed out when he was laying down.       ## Fall secondary to syncope with facial bone fractures  ## Lefort I and nasal bone fracture (non operative)  ## Recurrent Epistaxis in setting of anticoagulation and severe nasal bone fractures.  Rebleeding was tamponaded with epistat in the ER on the right  hgb stable,   ENT to see in AM (Dr Christine)  Keflex 500 mg qid  RRT called earlier this morning due to resumption of epistaxis  with blood down patient's throat and oozing from each nares.  JEANCARLOS spoke with ENT, Dr. Christine and states can tamponade with 1 cc of air if recurrent bleeding otherwise he arranged embolization with IR today.  Patient is made n.p.o. for IR embolization of right internal maxillary artery and right facial artery; done  See note.    Removed balloon on 12/25/23.   Per ENT - At this point he may have a soft diet.   instructed him and limited activity in the upcoming week including no nose blowing.  He should also resume his saline spray for his nose to keep it moist.  I would say no lifting over 10 pounds.  He may restart his Eliquis at 5 mg twice daily.  He may have follow-up with oral and maxillofacial surgery for consideration of treatment of his minimally displaced LeFort I fracture sometime in the upcoming week.  He may have follow-up with ENT pending further bleeding and/or consideration of treatment of his septal deviation which he reports is being longstanding.     ## RML segmental and subsegmental PE  This was noted on 12/16 2 admissions ago, no LE US was performed at that time  Dscharged with eliquis  In setting of rebleed hold eliquis  Will check bilateral LE US to look for residual clot: NEG  Restarted eliquis on 12/25     ## Recent Dx of Covid 19  Patient completed paxlovid  No active fevers  Considered COVID recovered 12/26/2023     ## Afib with RVR  ## Chronic bifasicular block  ## Recent syncope likely related to severe cough  ## HTN  Patient has hx of afib   Recently had RVR episodes   Monitor on telemetry and continue metoprolol  Restarted eliquis prior to discharge after ent cleared him         ## DM type II  ## Obesity  Advanced to soft diet   Hold glucotrol and metformin  Accu checks with sliding scale insulin  Normally on 65 units of lantus BID, will give 20 units in AM   Now postembolization resumption of diabetic diet however glucoses remain soft at 103-107, for now follow glucoses, continue SSI.    "    Clinically Significant Risk Factors Present on Admission               # Drug Induced Coagulation Defect: home medication list includes an anticoagulant medication        # DMII: A1C = 7.9 % (Ref range: <5.7 %) within past 6 months    # Obesity: Estimated body mass index is 33.78 kg/m  as calculated from the following:    Height as of this encounter: 1.905 m (6' 3\").    Weight as of this encounter: 122.6 kg (270 lb 4.5 oz).               Haley Kan MD, MD        Code Status   Full Code       Primary Care Physician   Bryan Patrick    Physical Exam   Temp: 97.6  F (36.4  C) Temp src: Oral BP: 120/56 Pulse: 75   Resp: 18 SpO2: 95 % O2 Device: None (Room air) Oxygen Delivery: 2 LPM  Vitals:    12/23/23 1600 12/24/23 0049   Weight: 120.2 kg (265 lb) 122.6 kg (270 lb 4.5 oz)     Vital Signs with Ranges  Temp:  [97.6  F (36.4  C)-98.2  F (36.8  C)] 97.6  F (36.4  C)  Pulse:  [62-75] 75  Resp:  [14-18] 18  BP: (108-140)/(50-72) 120/56  SpO2:  [92 %-98 %] 95 %  I/O last 3 completed shifts:  In: 540 [P.O.:540]  Out: 600 [Urine:600]    Physical Exam  Constitutional:       Appearance: Normal appearance.   Cardiovascular:      Rate and Rhythm: Normal rate and regular rhythm.      Pulses: Normal pulses.      Heart sounds: Normal heart sounds.   Pulmonary:      Effort: Pulmonary effort is normal. No respiratory distress.      Breath sounds: Normal breath sounds.   Abdominal:      General: Abdomen is flat. Bowel sounds are normal. There is no distension.      Tenderness: There is no abdominal tenderness. There is no guarding.   Skin:     General: Skin is warm and dry.   Neurological:      General: No focal deficit present.           Discharge Disposition   Discharged to home  Condition at discharge: Stable    Consultations This Hospital Stay   ENT IP CONSULT  INTERVENTIONAL RADIOLOGY ADULT/PEDS IP CONSULT  CARE MANAGEMENT / SOCIAL WORK IP CONSULT    Time Spent on this Encounter   IHaley MD, personally saw " the patient today and spent greater than 30 minutes discharging this patient.    Discharge Orders      Adult ENT  Referral      Reason for your hospital stay    epistaxis     Follow-up and recommended labs and tests     Follow up with primary care provider, Bryan Patrick, within 7 days for hospital follow- up.  The following labs/tests are recommended: cbc, bmp in 1 week .     Activity    Your activity upon discharge: activity as tolerated     Discharge Instructions    soft diet.    limited activity in the upcoming week including no nose blowing.    He should also resume his saline spray for his nose to keep it moist.    no lifting over 10 pounds.    restart his Eliquis at 5 mg twice daily.     follow-up with oral and maxillofacial surgery for consideration of treatment of his minimally displaced LeFort I fracture sometime in the upcoming week.    He may have follow-up with ENT pending further bleeding and/or consideration of treatment of his septal deviation which he reports is being longstanding.     Diet    Follow this diet upon discharge: Orders Placed This Encounter      Mechanical/Dental Soft Diet     Discharge Medications   Current Discharge Medication List        CONTINUE these medications which have NOT CHANGED    Details   acetaminophen (TYLENOL) 325 MG tablet Take 2 tablets (650 mg) by mouth every 4 hours as needed for mild pain or other (and adjunct with moderate or severe pain or per patient request)      Apixaban Starter Pack (ELIQUIS DVT/PE STARTER PACK) 5 MG TBPK Take 10 mg by mouth 2 times daily for 7 days, THEN 5 mg 2 times daily for 23 days.  Qty: 70 each, Refills: 0    Associated Diagnoses: Syncope and collapse; Other acute pulmonary embolism without acute cor pulmonale (H)      atorvastatin (LIPITOR) 40 MG tablet Take 1 tablet (40 mg) by mouth every evening    Comments: Hold while on paxlovid      cephALEXin (KEFLEX) 500 MG capsule Take 1 capsule (500 mg) by mouth every 12 hours  for 13 doses  Qty: 13 capsule, Refills: 0    Associated Diagnoses: Epistaxis      Cholecalciferol (VITAMIN D-3 PO) Take 1 tablet by mouth Every Mon, Wed, Fri Morning      Cyanocobalamin (B-12 PO) Take 1 tablet by mouth daily      glipiZIDE (GLUCOTROL XL) 10 MG 24 hr tablet Take 10 mg by mouth 2 times daily      insulin glargine 100 UNIT/ML pen Inject 65 Units Subcutaneous 2 times daily      MAGNESIUM PO Take 1 tablet by mouth See Admin Instructions Tuesday, Thursday, Saturday alternating with vitamin D      metFORMIN (GLUCOPHAGE) 500 MG tablet Take 1,000 mg by mouth 2 times daily (with meals)      metoprolol succinate ER (TOPROL XL) 50 MG 24 hr tablet Take 50 mg by mouth daily      sodium chloride (OCEAN) 0.65 % nasal spray Spray to both nasal cavities every 2 hours to keep packing moist and will help with removal  Qty: 104 mL, Refills: 1    Associated Diagnoses: Epistaxis      ferrous sulfate (FEROSUL) 325 (65 Fe) MG tablet Take 1 tablet (325 mg) by mouth daily (with breakfast)  Qty: 30 tablet, Refills: 0    Comments: Dont start if stool remains black  Associated Diagnoses: Other acute pulmonary embolism without acute cor pulmonale (H)           Allergies   No Known Allergies  Data   Recent Labs   Lab Test 12/25/23  0802 12/24/23  1438 12/24/23  0635 12/23/23 2119 12/22/23  0550 12/21/23  1421   WBC 8.4  --  8.1 10.6 7.9  --    HGB 10.4* 10.7* 10.6* 11.1* 11.6*  --    MCV 93  --  92 93 92  --      --  319 327 288  --    INR  --   --   --   --  1.04 1.19*      Recent Labs   Lab Test 12/25/23  0748 12/25/23  0223 12/24/23  2135 12/24/23  1216 12/24/23  0635 12/24/23  0055 12/23/23 2119 12/22/23  0758 12/22/23  0550   NA  --   --   --   --  139  --  138  --  138   POTASSIUM  --   --   --   --  3.9  --  4.0  --  4.1   CHLORIDE  --   --   --   --  103  --  101  --  103   CO2  --   --   --   --  26  --  26  --  23   BUN  --   --   --   --  12.2  --  15.2  --  14.2   CR  --   --   --   --  0.81  --  0.90  --   0.82   ANIONGAP  --   --   --   --  10  --  11  --  12   RHONDA  --   --   --   --  8.1*  --  8.5*  --  8.4*   * 162* 205*   < > 103*   < > 141*   < > 95    < > = values in this interval not displayed.         Results for orders placed or performed during the hospital encounter of 12/23/23   US Lower Extremity Venous Duplex Bilateral    Narrative    EXAM: ULTRASOUND LOWER EXTREMITY VENOUS DUPLEX BILATERAL  LOCATION: [facility]  DATE: 12/24/2023    INDICATION: History of PE, evaluate for lower extremity DVT burden.  COMPARISON: None.  TECHNIQUE: Venous Duplex ultrasound of bilateral lower extremities with and without compression, augmentation and duplex. Color flow and spectral Doppler with waveform analysis performed.    FINDINGS: Exam includes the common femoral, femoral, popliteal veins as well as segmentally visualized deep calf veins and greater saphenous vein.     RIGHT: No deep vein thrombosis. No superficial thrombophlebitis. No popliteal cyst.    LEFT: No deep vein thrombosis. No superficial thrombophlebitis. No popliteal cyst.      Impression    IMPRESSION:  1.  No deep venous thrombosis in the bilateral lower extremities.

## 2023-12-25 NOTE — PROGRESS NOTES
Observation goals  PRIOR TO DISCHARGE        Comments:   1. Monitor for bleeding stability and stable hgb overnight- MET  2. Follow up evaluation by ENT- NOT MET

## 2023-12-26 LAB
ATRIAL RATE - MUSE: 87 BPM
ATRIAL RATE - MUSE: NORMAL BPM
DIASTOLIC BLOOD PRESSURE - MUSE: NORMAL MMHG
DIASTOLIC BLOOD PRESSURE - MUSE: NORMAL MMHG
INTERPRETATION ECG - MUSE: NORMAL
INTERPRETATION ECG - MUSE: NORMAL
P AXIS - MUSE: 73 DEGREES
P AXIS - MUSE: NORMAL DEGREES
PR INTERVAL - MUSE: 248 MS
PR INTERVAL - MUSE: NORMAL MS
QRS DURATION - MUSE: 114 MS
QRS DURATION - MUSE: 118 MS
QT - MUSE: 330 MS
QT - MUSE: 400 MS
QTC - MUSE: 481 MS
QTC - MUSE: 507 MS
R AXIS - MUSE: -58 DEGREES
R AXIS - MUSE: -67 DEGREES
SYSTOLIC BLOOD PRESSURE - MUSE: NORMAL MMHG
SYSTOLIC BLOOD PRESSURE - MUSE: NORMAL MMHG
T AXIS - MUSE: 34 DEGREES
T AXIS - MUSE: 43 DEGREES
VENTRICULAR RATE- MUSE: 142 BPM
VENTRICULAR RATE- MUSE: 87 BPM

## 2023-12-27 ENCOUNTER — PATIENT OUTREACH (OUTPATIENT)
Dept: CARE COORDINATION | Facility: CLINIC | Age: 78
End: 2023-12-27

## 2023-12-27 ENCOUNTER — OFFICE VISIT (OUTPATIENT)
Dept: OTOLARYNGOLOGY | Facility: CLINIC | Age: 78
End: 2023-12-27
Payer: MEDICARE

## 2023-12-27 VITALS
DIASTOLIC BLOOD PRESSURE: 77 MMHG | HEART RATE: 97 BPM | SYSTOLIC BLOOD PRESSURE: 131 MMHG | BODY MASS INDEX: 33.57 KG/M2 | WEIGHT: 270 LBS | HEIGHT: 75 IN

## 2023-12-27 DIAGNOSIS — J34.2 NASAL SEPTAL DEVIATION: Primary | ICD-10-CM

## 2023-12-27 DIAGNOSIS — S02.411A CLOSED LE FORT I FRACTURE, INITIAL ENCOUNTER (H): ICD-10-CM

## 2023-12-27 DIAGNOSIS — R04.0 EPISTAXIS: ICD-10-CM

## 2023-12-27 DIAGNOSIS — S02.2XXA CLOSED FRACTURE OF NASAL BONE, INITIAL ENCOUNTER: ICD-10-CM

## 2023-12-27 PROCEDURE — 99202 OFFICE O/P NEW SF 15 MIN: CPT | Performed by: PHYSICIAN ASSISTANT

## 2023-12-27 ASSESSMENT — PAIN SCALES - GENERAL: PAINLEVEL: NO PAIN (1)

## 2023-12-27 NOTE — LETTER
12/27/2023       RE: Shola Lemus  [address] [address] [address] [facility] [id number]     Dear Colleague,    Thank you for referring your patient, Shola Lemus, to the [facility] [place] [facility]. Please see a copy of my visit note below.      Otolaryngology Clinic  December 27, 2023    Chief Complaint:   Inpatient follow up       History of Present Illness:   Shola Lemus is a 78 year old male who was first seen by our service while admitted on 12/21/2023 for epistaxis and possible surgical management by OMFS for a nasal frautre and lefort fracture. OMFS did not pursue surgery and patient was packed with Merocels that were to be removed in 5-7 days. Patient is on Eliquis for PE which has complicated his healing process. He presented to the ED at [facility] on 12/23/2023 for persistent epistaxis at which time he underwent SPA embolization and was discharged on 12/25/2023. His visit today was initially scheduled for Merocel removal however he has not had packing in place since his embolization. He reports no further bleeding from his nose. With regard to his fractures, he denies any change in occlusion, denies pain, or difficulty eating. He endorses frustration with the soft diet at this point. He denies any noticeable facial structure changes.      Past Medical History:  Past Medical History:   Diagnosis Date     Concussion 20 years ago     Diabetes mellitus (H)      Hypercholesteremia      Hypertension        Past Surgical History:  Past Surgical History:   Procedure Laterality Date     HEAD & NECK SURGERY      tonsillectomy     HERNIA REPAIR      bilat     IR CAROTID CEREBRAL ANGIOGRAM BILATERAL  12/24/2023       Medications:  Current Outpatient Medications   Medication Sig Dispense Refill     acetaminophen (TYLENOL) 325 MG tablet Take 2 tablets (650 mg) by mouth every 4 hours as needed for mild pain or other  "(and adjunct with moderate or severe pain or per patient request)       Apixaban Starter Pack (ELIQUIS DVT/PE STARTER PACK) 5 MG TBPK Take 10 mg by mouth 2 times daily for 7 days, THEN 5 mg 2 times daily for 23 days. 70 each 0     atorvastatin (LIPITOR) 40 MG tablet Take 1 tablet (40 mg) by mouth every evening       Cholecalciferol (VITAMIN D-3 PO) Take 1 tablet by mouth Every Mon, Wed, Fri Morning       Cyanocobalamin (B-12 PO) Take 1 tablet by mouth daily       ferrous sulfate (FEROSUL) 325 (65 Fe) MG tablet Take 1 tablet (325 mg) by mouth daily (with breakfast) 30 tablet 0     glipiZIDE (GLUCOTROL XL) 10 MG 24 hr tablet Take 10 mg by mouth 2 times daily       insulin glargine 100 UNIT/ML pen Inject 65 Units Subcutaneous 2 times daily       MAGNESIUM PO Take 1 tablet by mouth See Admin Instructions Tuesday, Thursday, Saturday alternating with vitamin D       metFORMIN (GLUCOPHAGE) 500 MG tablet Take 1,000 mg by mouth 2 times daily (with meals)       metoprolol succinate ER (TOPROL XL) 50 MG 24 hr tablet Take 50 mg by mouth daily       sodium chloride (OCEAN) 0.65 % nasal spray Spray to both nasal cavities every 2 hours to keep packing moist and will help with removal 104 mL 1       Allergies:  No Known Allergies     Social History:  Social History     Tobacco Use     Smoking status: Never     Smokeless tobacco: Never   Substance Use Topics     Alcohol use: Yes     Comment: one drink every 2 weeks     Drug use: No       ROS: 10 point ROS neg other than the symptoms noted above in the HPI.    Physical Exam:    /77 (BP Location: Left arm, Patient Position: Sitting, Cuff Size: Adult Large)   Pulse 97   Ht 1.905 m (6' 3\")   Wt 122.5 kg (270 lb)   BMI 33.75 kg/m       Constitutional:  The patient was unaccompanied, well-groomed, and in no acute distress.     Skin: Normal:  warm and pink without rash    Neurologic: Alert and oriented x 3.  CN's III-XII within normal limits.  Voice normal.    Psychiatric: The " patient's affect was calm, cooperative, and appropriate.     Communication:  Normal; communicates verbally, normal voice quality.    Respiratory: Breathing comfortably without stridor or exertion of accessory muscles.    Head/Face:  Normocephalic and atraumatic.  Yellow/brown bruising under bilateral eyes, no other facial deformity or lesion.   Eyes: Wearing glasses. Extraocular movement intact.   Ears: Pinnae and tragus non-tender.  EAC's and TM's were clear.     Nose: Significant anterior septal deviation, no hematoma, no drainage or bleeding   Oral Cavity: Normal tongue, floor of mouth, buccal mucosa, and palate.  No lesions or masses on inspection,  appears to have an overbite however patient believes this is baseline   Oropharynx: Normal mucosa, palate symmetric with normal elevation. No abnormal lymph tissue in the oropharynx.   Neck: Supple with normal laryngeal and tracheal landmarks. No palpable thyroid.  Normal range of motion.    Lymphatic: There is no palpable lymphadenopathy in the neck.       Assessment and Plan:  Nasal fracture  Facial fracture  Epistaxis  Patient did not have merocels in place at the time of exam due to undergoing embolization during his last hospital visit. He has been stable since and it is appropriate for him to continue with anticoagulation. He should go to the ER if he has recurrence of uncontrolled epistaxis in the future. Fractures appear stable and there is low concern for need for surgery. Per hospital notes, patient may follow up with OMFS if he notes any cosmetic issues that he would like to address. He may also follow up with our clinic concerning his fractures as well.      Septal deviation  Degree of patient's septal deviation is severe. This is chronic per patient report but he is open to seeing rhinology to discuss options or repair.       Patient will follow up as scheduled    Colleen Kuo PA-C  Otolaryngology  Head & Neck Surgery  [phone]    Review of  external notes as documented elsewhere in note  15 minutes spent by me on the date of the encounter doing chart review, history and exam, documentation and further activities per the note      Again, thank you for allowing me to participate in the care of your patient.      Sincerely,    Colleen Kuo PA-C

## 2023-12-27 NOTE — PROGRESS NOTES
[facility]    Background: Transitional Care Management program identified per system criteria and reviewed by [facility] team for possible outreach.    Assessment: Upon chart review, [facility] Team member will not proceed with patient outreach related to this episode of Transitional Care Management program due to reason below:    Patient has a follow up appointment with an appropriate provider today for hospital discharge    Plan: Transitional Care Management episode addressed appropriately per reason noted above.      NEYMAR Avery  [facility]    *Connected Care Resource Team does NOT follow patient ongoing. Referrals are identified based on internal discharge reports and the outreach is to ensure patient has an understanding of their discharge instructions.

## 2023-12-27 NOTE — PATIENT INSTRUCTIONS
1. You were seen in the ENT Clinic today by PEE Crowley.  If you have any questions or concerns after your appointment, please call   - Option 1: ENT Clinic: [phone]   - Option 2: Vanessa (Colleen Kuo's  Nurse): [phone]                     2.   Plan to return to clinic     Vanessa Guerra LPN  [facility] - Otolaryngology

## 2023-12-29 NOTE — UTILIZATION REVIEW
Admission Status; Secondary Review Determination    No action to be taken. Please contact me on my Email : [email or web address] if you have any questions.    As part of the [place] Utilization review plan, a self-audit is done on Medicare inpatient admission with less than 2 midnights stay. The 2014 IPPS Final Rule allows outpatient billing in the event that a hospital determines that an inpatient admission was not medically necessary under utilization review process.     (x) Outpatient status would be Appropriate- Short Stay- Post discharge review.    RATIONALE FOR DETERMINATION  Shola Lemus is a 78 year old male who presented to the ED at [facility] d/t continued epistaxis. Patient was transferred here per OMFS for surgical intervention  OMFS decided not to peruse  surgery and ENT was able to stop active bleed with Merocel  They recommended antibiotics while nasal packing in place    Patient was admitted and discharge after one night stay. Record was sent by  for a PA review. Based on the  severity of illness, intensity of service provided, expected LOS and risk for adverse outcome make the care appropriate for further outpatient/observation; however, doesn't meet criteria for hospital inpatient admission.       The information on this document is developed by the utilization review team in order for the business office to ensure compliance.  This only denotes the appropriateness of proper admission status and does not reflect the quality of care rendered.       The definitions of Inpatient Status and Observation Status used in making the determination above are those provided in the CMS Coverage Manual, Chapter 1 and Chapter 6, section 70.4.     Please cont me if you want to discuss further about this admission episode.      Cassidy Anglin MD, FACP, SHELL  Medical Director - Utilization management  Staff Hospitalist  [facility]    Pager: [phone]

## 2023-12-31 NOTE — PROGRESS NOTES
Otolaryngology Clinic  December 27, 2023    Chief Complaint:   Inpatient follow up       History of Present Illness:   Shola Lemus is a 78 year old male who was first seen by our service while admitted on 12/21/2023 for epistaxis and possible surgical management by OMFS for a nasal frautre and lefort fracture. OMFS did not pursue surgery and patient was packed with Merocels that were to be removed in 5-7 days. Patient is on Eliquis for PE which has complicated his healing process. He presented to the ED at [facility] on 12/23/2023 for persistent epistaxis at which time he underwent SPA embolization and was discharged on 12/25/2023. His visit today was initially scheduled for Merocel removal however he has not had packing in place since his embolization. He reports no further bleeding from his nose. With regard to his fractures, he denies any change in occlusion, denies pain, or difficulty eating. He endorses frustration with the soft diet at this point. He denies any noticeable facial structure changes.      Past Medical History:  Past Medical History:   Diagnosis Date    Concussion 20 years ago    Diabetes mellitus (H)     Hypercholesteremia     Hypertension        Past Surgical History:  Past Surgical History:   Procedure Laterality Date    HEAD & NECK SURGERY      tonsillectomy    HERNIA REPAIR      bilat    IR CAROTID CEREBRAL ANGIOGRAM BILATERAL  12/24/2023       Medications:  Current Outpatient Medications   Medication Sig Dispense Refill    acetaminophen (TYLENOL) 325 MG tablet Take 2 tablets (650 mg) by mouth every 4 hours as needed for mild pain or other (and adjunct with moderate or severe pain or per patient request)      Apixaban Starter Pack (ELIQUIS DVT/PE STARTER PACK) 5 MG TBPK Take 10 mg by mouth 2 times daily for 7 days, THEN 5 mg 2 times daily for 23 days. 70 each 0    atorvastatin (LIPITOR) 40 MG tablet Take 1 tablet (40 mg) by mouth every evening      Cholecalciferol (VITAMIN D-3 PO)  "Take 1 tablet by mouth Every Mon, Wed, Fri Morning      Cyanocobalamin (B-12 PO) Take 1 tablet by mouth daily      ferrous sulfate (FEROSUL) 325 (65 Fe) MG tablet Take 1 tablet (325 mg) by mouth daily (with breakfast) 30 tablet 0    glipiZIDE (GLUCOTROL XL) 10 MG 24 hr tablet Take 10 mg by mouth 2 times daily      insulin glargine 100 UNIT/ML pen Inject 65 Units Subcutaneous 2 times daily      MAGNESIUM PO Take 1 tablet by mouth See Admin Instructions Tuesday, Thursday, Saturday alternating with vitamin D      metFORMIN (GLUCOPHAGE) 500 MG tablet Take 1,000 mg by mouth 2 times daily (with meals)      metoprolol succinate ER (TOPROL XL) 50 MG 24 hr tablet Take 50 mg by mouth daily      sodium chloride (OCEAN) 0.65 % nasal spray Spray to both nasal cavities every 2 hours to keep packing moist and will help with removal 104 mL 1       Allergies:  No Known Allergies     Social History:  Social History     Tobacco Use    Smoking status: Never    Smokeless tobacco: Never   Substance Use Topics    Alcohol use: Yes     Comment: one drink every 2 weeks    Drug use: No       ROS: 10 point ROS neg other than the symptoms noted above in the HPI.    Physical Exam:    /77 (BP Location: Left arm, Patient Position: Sitting, Cuff Size: Adult Large)   Pulse 97   Ht 1.905 m (6' 3\")   Wt 122.5 kg (270 lb)   BMI 33.75 kg/m       Constitutional:  The patient was unaccompanied, well-groomed, and in no acute distress.     Skin: Normal:  warm and pink without rash    Neurologic: Alert and oriented x 3.  CN's III-XII within normal limits.  Voice normal.    Psychiatric: The patient's affect was calm, cooperative, and appropriate.     Communication:  Normal; communicates verbally, normal voice quality.    Respiratory: Breathing comfortably without stridor or exertion of accessory muscles.    Head/Face:  Normocephalic and atraumatic.  Yellow/brown bruising under bilateral eyes, no other facial deformity or lesion.   Eyes: Wearing " glasses. Extraocular movement intact.   Ears: Pinnae and tragus non-tender.  EAC's and TM's were clear.     Nose: Significant anterior septal deviation, no hematoma, no drainage or bleeding   Oral Cavity: Normal tongue, floor of mouth, buccal mucosa, and palate.  No lesions or masses on inspection,  appears to have an overbite however patient believes this is baseline   Oropharynx: Normal mucosa, palate symmetric with normal elevation. No abnormal lymph tissue in the oropharynx.   Neck: Supple with normal laryngeal and tracheal landmarks. No palpable thyroid.  Normal range of motion.    Lymphatic: There is no palpable lymphadenopathy in the neck.       Assessment and Plan:  Nasal fracture  Facial fracture  Epistaxis  Patient did not have merocels in place at the time of exam due to undergoing embolization during his last hospital visit. He has been stable since and it is appropriate for him to continue with anticoagulation. He should go to the ER if he has recurrence of uncontrolled epistaxis in the future. Fractures appear stable and there is low concern for need for surgery. Per hospital notes, patient may follow up with OMFS if he notes any cosmetic issues that he would like to address. He may also follow up with our clinic concerning his fractures as well.      Septal deviation  Degree of patient's septal deviation is severe. This is chronic per patient report but he is open to seeing rhinology to discuss options or repair.       Patient will follow up as scheduled    Colleen Kuo PA-C  Otolaryngology  Head & Neck Surgery  [phone]    Review of external notes as documented elsewhere in note  15 minutes spent by me on the date of the encounter doing chart review, history and exam, documentation and further activities per the note

## 2024-01-02 ENCOUNTER — TELEPHONE (OUTPATIENT)
Dept: OTOLARYNGOLOGY | Facility: CLINIC | Age: 79
End: 2024-01-02
Payer: MEDICARE

## 2024-01-02 NOTE — TELEPHONE ENCOUNTER
Writer called to schedule Deviated septum - Referred by Colleen cage. Patient will call back to schedule

## 2024-02-21 ENCOUNTER — TELEPHONE (OUTPATIENT)
Dept: OTOLARYNGOLOGY | Facility: CLINIC | Age: 79
End: 2024-02-21
Payer: MEDICARE

## 2024-02-27 NOTE — TELEPHONE ENCOUNTER
FUTURE VISIT INFORMATION      FUTURE VISIT INFORMATION:  Date: 5/10/24  Time: 11 AM  Location: [facility]  REFERRAL INFORMATION:  Referring provider:    Referring providers clinic:    Reason for visit/diagnosis:  per pt, refd Colleen Kuo, deviated septum, csc confd     RECORDS REQUESTED FROM      Clinic name Comments Records Status Imaging Status   [facility] Colleen Kuo PA-C  [facility] 12/21/23 CTA HEAD NECK   12/15/23 CT HEAD + CT CERVICAL  12/15/23 CT FACIAL Epic PACS   [facility] ER 12/23/23 ER notes  12/21/23 ER notes  12/15/23 ER notes Epic

## 2024-05-10 ENCOUNTER — PRE VISIT (OUTPATIENT)
Dept: OTOLARYNGOLOGY | Facility: CLINIC | Age: 79
End: 2024-05-10

## 2024-05-10 ENCOUNTER — OFFICE VISIT (OUTPATIENT)
Dept: OTOLARYNGOLOGY | Facility: CLINIC | Age: 79
End: 2024-05-10
Attending: HOSPITALIST
Payer: MEDICARE

## 2024-05-10 VITALS
SYSTOLIC BLOOD PRESSURE: 142 MMHG | WEIGHT: 281.5 LBS | BODY MASS INDEX: 35 KG/M2 | DIASTOLIC BLOOD PRESSURE: 79 MMHG | HEART RATE: 80 BPM | HEIGHT: 75 IN

## 2024-05-10 DIAGNOSIS — J32.0 CHRONIC MAXILLARY SINUSITIS: Primary | ICD-10-CM

## 2024-05-10 DIAGNOSIS — R04.0 EPISTAXIS: ICD-10-CM

## 2024-05-10 DIAGNOSIS — J34.2 NASAL SEPTAL DEVIATION: ICD-10-CM

## 2024-05-10 PROCEDURE — 99203 OFFICE O/P NEW LOW 30 MIN: CPT | Mod: 25 | Performed by: STUDENT IN AN ORGANIZED HEALTH CARE EDUCATION/TRAINING PROGRAM

## 2024-05-10 PROCEDURE — 99000 SPECIMEN HANDLING OFFICE-LAB: CPT | Performed by: PATHOLOGY

## 2024-05-10 PROCEDURE — 87070 CULTURE OTHR SPECIMN AEROBIC: CPT | Performed by: STUDENT IN AN ORGANIZED HEALTH CARE EDUCATION/TRAINING PROGRAM

## 2024-05-10 PROCEDURE — 31237 NSL/SINS NDSC SURG BX POLYPC: CPT | Mod: RT | Performed by: STUDENT IN AN ORGANIZED HEALTH CARE EDUCATION/TRAINING PROGRAM

## 2024-05-10 PROCEDURE — 87075 CULTR BACTERIA EXCEPT BLOOD: CPT | Performed by: STUDENT IN AN ORGANIZED HEALTH CARE EDUCATION/TRAINING PROGRAM

## 2024-05-10 RX ORDER — FLUTICASONE PROPIONATE 50 MCG
1-2 SPRAY, SUSPENSION (ML) NASAL 2 TIMES DAILY
Qty: 16 ML | Refills: 2 | Status: SHIPPED | OUTPATIENT
Start: 2024-05-10

## 2024-05-10 RX ORDER — APIXABAN 5 MG/1
5 TABLET, FILM COATED ORAL 2 TIMES DAILY
COMMUNITY

## 2024-05-10 ASSESSMENT — PAIN SCALES - GENERAL: PAINLEVEL: NO PAIN (0)

## 2024-05-10 NOTE — LETTER
5/10/2024       RE: Shola Lemus  [address] [address] [id number]     Dear Colleague,    Thank you for referring your patient, Shola Lemus, to the [facility] [place] at [facility]. Please see a copy of my visit note below.      [facility] - Rhinology  New Patient Visit      Encounter date:   May 10, 2024    Referring Provider:  Colleen Kuo PA-C  [address] [place] [address]    Assessment, Decision Making, and Plan:  (J34.2) Nasal septal deviation  (primary encounter diagnosis)  (J32.0) Chronic maxillary sinusitis  (R04.0) Epistaxis  Comment:   --recent midface fracture s/p right imax/facial artery embo  --imaging at time of trauma shows R > L maxillary sinus opac + ethmoid opacification  --unclear how much of the opacification is bleeding vs mucosal disease  --given state of dentition, suspect that he may have an underlying R odontogenic sinusitis; unable to endoscopically assess the left side due to severe DNS  --we discussed that we would trial medical therapy for the sinusitis and follow up imaging to assess for improvement and how affected the left side is  --if he fails medical therapy, may require surgical intervention - if there is a significant left sided concern, then he may require concomitant open SRP to gain access to the left  Plan: IMAGESTREAM RECORDING ORDER, Anaerobic         Bacterial Culture Routine, CT Sinus w/o         Contrast, Adult ENT Clinic Follow-up Order         (Blank), amoxicillin-clavulanate (AUGMENTIN)         875-125 MG tablet, fluticasone (FLONASE) 50         MCG/ACT nasal spray, Respiratory Aerobic         Bacterial Culture, CANCELED: Swab Aerobic         Bacterial Culture Routine Without Gram Stain  --culture directed antibiotics, augmentin prescribed will adjust PRN x 2 weeks  --saline rinses x 1 month  --has DM with complications, will  avoid prednisone  --flonase to the right, 2 sprays BID  --follow up 4-6 weeks with updated scan    Chief Complaint: nasal issues    History of Present Illness:   Shola Lemus is a 79 year old male who presents for consultation regarding nasal issues.    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     Medical History:  Past Medical History:   Diagnosis Date     Concussion 20 years ago     Diabetes mellitus (H)      Hypercholesteremia      Hypertension         Surgical History:   Past Surgical History:   Procedure Laterality Date     HEAD & NECK SURGERY      tonsillectomy     HERNIA REPAIR      bilat     IR CAROTID CEREBRAL ANGIOGRAM BILATERAL  12/24/2023        Family History:  No family history on file.     Social History:   Social History     Socioeconomic History     Marital status:      Spouse name: None     Number of children: None     Years of education: None     Highest education level: None   Tobacco Use     Smoking status: Never     Smokeless tobacco: Never   Substance and Sexual Activity     Alcohol use: Yes     Comment: one drink every 2 weeks     Drug use: No     Social Determinants of Health     Financial Resource Strain: Low Risk  (2/26/2024)    [facility], [facility]    Financial  "Resource Strain      Difficulty of Paying Living Expenses: 3   Food Insecurity: No Food Insecurity (2/26/2024)    Received from [facility] [employer]  [facility], [facility]    Food Insecurity      Worried About Running Out of Food in the Last Year: 1   Transportation Needs: No Transportation Needs (2/26/2024)    Received from [facility], [facility]    Transportation Needs      Lack of Transportation (Medical): 1   Social Connections: Socially Integrated (2/26/2024)    Received from [facility], [facility]    Social Connections      Frequency of Communication with Friends and Family: 0   Housing Stability: Low Risk  (2/26/2024)    Received from [facility], [facility]    Housing Stability      Unable to Pay for Housing in the Last Year: 1        Physical Exam:  Vital signs: BP (!) 142/79   Pulse 80   Ht 1.905 m (6' 3\")   Wt 127.7 kg (281 lb 8 oz)   BMI 35.19 kg/m     General Appearance: No acute distress, appropriate demeanor, conversant  Eyes: moist conjunctivae; EOMI; pupils symmetric; visual acuity grossly intact; no proptosis  Head: normocephalic; overall symmetric appearance without deformity  Face: overall symmetric without deformity  Ears: Normal appearance of external ear; external meatus normal in appearance; TMs intact without perforation bilaterally;   Nose: No external deformity; septum (Significant caudal septal deviation to the left; completely blocks the nasal vestibule and valve, 100%) ; inferior turbinates (non hypertrophic)   Oral Cavity/oropharynx: Normal appearance of mucosa; tongue midline; no mass or lesions; oropharynx without obvious mucosal abnormality  Neck: no palpable lymphadenopathy; thyroid without palpable nodules  Lungs: symmetric chest " rise; no wheezing  CV: Good distal perfusion; normal hear rate  Extremities: No deformity  Neurologic Exam: Cranial nerves II-XII are grossly intact; no focal deficit    Procedure Note  Procedure performed: Rigid nasal endoscopy, debridement  Indication: To evaluate for sinonasal pathology not visualized on routine anterior rhinoscopy  Anesthesia: 4% topical lidocaine with 0.05% oxymetazoline  Description of procedure: A 30 degree, 3 mm rigid endoscope was inserted into bilateral nasal cavities and the nasal valves, nasal cavity, middle meatus, sphenoethmoid recess, and nasopharynx were thoroughly evaluated for evidence of obstruction, edema, purulence, polyps and/or mass/lesion.     Findings:    Purulent drainage from the right middle meatus with polypoid edema of the uncinate or polyps  This was cultured and debrided  Unable to view anything on the left side due to DNS    The patient tolerated the procedure well without complication.     Imaging Review:  CT-A 12/2023 - primary review reveals complete opacification of the right maxillary sinus; partial opac of the left, patchy opac of the ethmoids, R > L frontal opac, some contrast blush at the right SPA exit.  Large periapical lucency of #2.     Randy Wright MD    [facility]  Rhinology  Endoscopic Skull Base Surgery  [facility]  Department of Otolaryngology - Head & Neck Surgery

## 2024-05-10 NOTE — PROGRESS NOTES
[facility] - Rhinology  New Patient Visit      Encounter date:   May 10, 2024    Referring Provider:  Colleen Kuo PA-C  [address] [place] [id number]    Assessment, Decision Making, and Plan:  (J34.2) Nasal septal deviation  (primary encounter diagnosis)  (J32.0) Chronic maxillary sinusitis  (R04.0) Epistaxis  Comment:   --recent midface fracture s/p right imax/facial artery embo  --imaging at time of trauma shows R > L maxillary sinus opac + ethmoid opacification  --unclear how much of the opacification is bleeding vs mucosal disease  --given state of dentition, suspect that he may have an underlying R odontogenic sinusitis; unable to endoscopically assess the left side due to severe DNS  --we discussed that we would trial medical therapy for the sinusitis and follow up imaging to assess for improvement and how affected the left side is  --if he fails medical therapy, may require surgical intervention - if there is a significant left sided concern, then he may require concomitant open SRP to gain access to the left  Plan: IMAGESTREAM RECORDING ORDER, Anaerobic         Bacterial Culture Routine, CT Sinus w/o         Contrast, Adult ENT Clinic Follow-up Order         (Blank), amoxicillin-clavulanate (AUGMENTIN)         875-125 MG tablet, fluticasone (FLONASE) 50         MCG/ACT nasal spray, Respiratory Aerobic         Bacterial Culture, CANCELED: Swab Aerobic         Bacterial Culture Routine Without Gram Stain  --culture directed antibiotics, augmentin prescribed will adjust PRN x 2 weeks  --saline rinses x 1 month  --has DM with complications, will avoid prednisone  --flonase to the right, 2 sprays BID  --follow up 4-6 weeks with updated scan    Chief Complaint: nasal issues    History of Present Illness:   Shola Lemus is a 79 year old male who presents for consultation regarding nasal issues.    Recent fall, nasal bone fracture, lefort fracture  Had severe epistaxis,  refractory to packing  Ultimately had R Imax/facial artery embo    Since then no bleeding  Continues to have thick post nasal drip and cough/throat clearing  Long standing DNS to the left with NAWO  Previously had set up for open SRP while in  but ultimately did not pursue surgery    On AC for PE    Saw dentist recently was told teeth are in good shape, no infection   Had temporary OAF after wisdom tooth removal that resolved    Review of systems: A 14-point review of systems has been conducted and was negative for any notable symptoms, except as dictated in the history of present illness.     Medical History:  Past Medical History:   Diagnosis Date    Concussion 20 years ago    Diabetes mellitus (H)     Hypercholesteremia     Hypertension         Surgical History:   Past Surgical History:   Procedure Laterality Date    HEAD & NECK SURGERY      tonsillectomy    HERNIA REPAIR      bilat    IR CAROTID CEREBRAL ANGIOGRAM BILATERAL  12/24/2023        Family History:  No family history on file.     Social History:   Social History     Socioeconomic History    Marital status:      Spouse name: None    Number of children: None    Years of education: None    Highest education level: None   Tobacco Use    Smoking status: Never    Smokeless tobacco: Never   Substance and Sexual Activity    Alcohol use: Yes     Comment: one drink every 2 weeks    Drug use: No     Social Determinants of Health     Financial Resource Strain: Low Risk  (2/26/2024)    Received from [employer] [facility], [facility]    Financial Resource Strain     Difficulty of Paying Living Expenses: 3   Food Insecurity: No Food Insecurity (2/26/2024)    Received from [employer] [facility], [facility]    Food Insecurity     Worried About Running Out of Food in the Last Year: 1   Transportation Needs: No Transportation Needs (2/26/2024)  "   Received from [employer] [facility], [facility]    Transportation Needs     Lack of Transportation (Medical): 1   Social Connections: Socially Integrated (2/26/2024)    Received from [employer] [facility] [employer] [facility], [facility] [employer] [facility]    Social Connections     Frequency of Communication with Friends and Family: 0   Housing Stability: Low Risk  (2/26/2024)    Received from [employer] [facility], [facility] [employer] [facility]    Housing Stability     Unable to Pay for Housing in the Last Year: 1        Physical Exam:  Vital signs: BP (!) 142/79   Pulse 80   Ht 1.905 m (6' 3\")   Wt 127.7 kg (281 lb 8 oz)   BMI 35.19 kg/m     General Appearance: No acute distress, appropriate demeanor, conversant  Eyes: moist conjunctivae; EOMI; pupils symmetric; visual acuity grossly intact; no proptosis  Head: normocephalic; overall symmetric appearance without deformity  Face: overall symmetric without deformity  Ears: Normal appearance of external ear; external meatus normal in appearance; TMs intact without perforation bilaterally;   Nose: No external deformity; septum (Significant caudal septal deviation to the left; completely blocks the nasal vestibule and valve, 100%) ; inferior turbinates (non hypertrophic)   Oral Cavity/oropharynx: Normal appearance of mucosa; tongue midline; no mass or lesions; oropharynx without obvious mucosal abnormality  Neck: no palpable lymphadenopathy; thyroid without palpable nodules  Lungs: symmetric chest rise; no wheezing  CV: Good distal perfusion; normal hear rate  Extremities: No deformity  Neurologic Exam: Cranial nerves II-XII are grossly intact; no focal deficit    Procedure Note  Procedure performed: Rigid nasal endoscopy, debridement  Indication: To evaluate for sinonasal pathology not visualized on routine anterior rhinoscopy  Anesthesia: 4% topical " lidocaine with 0.05% oxymetazoline  Description of procedure: A 30 degree, 3 mm rigid endoscope was inserted into bilateral nasal cavities and the nasal valves, nasal cavity, middle meatus, sphenoethmoid recess, and nasopharynx were thoroughly evaluated for evidence of obstruction, edema, purulence, polyps and/or mass/lesion.     Findings:    Purulent drainage from the right middle meatus with polypoid edema of the uncinate or polyps  This was cultured and debrided  Unable to view anything on the left side due to DNS    The patient tolerated the procedure well without complication.     Imaging Review:  CT-A 12/2023 - primary review reveals complete opacification of the right maxillary sinus; partial opac of the left, patchy opac of the ethmoids, R > L frontal opac, some contrast blush at the right SPA exit.  Large periapical lucency of #2.     Randy Wright MD    [facility]  Rhinology  Endoscopic Skull Base Surgery  [facility]  Department of Otolaryngology - Head & Neck Surgery

## 2024-05-10 NOTE — PATIENT INSTRUCTIONS
You were seen in the ENT Clinic today by Dr. Wright. If you have any questions or concerns after your appointment, please contact us (see below)    The following has been recommended for you based upon your appointment today:  Culture- will call with results  2 weeks of Augmentin sent to the pharmacy  1 month of nasal rinses twice daily   1 month of Flonase twice daily    Please return to clinic in 4-6 weeks with CT before visit      How to Contact Us:  Send a [employer] message to your provider. Our team will respond to you via [employer]. Occasionally, we will need to call you to get further information.  For urgent matters (Monday-Friday), call the ENT Clinic: [phone] and speak with a call center team member - they will route your call appropriately.   If you'd like to speak directly with a nurse, please find our contact information below. We do our best to check voicemail frequently throughout the day, and will work to call you back within 1-2 days. For urgent matters, please use the general clinic phone numbers listed above.    Nasra PARKER RN, BSN   RN Care Coordinator, ENT Clinic  [facility]  Direct: [phone]  Vanessa ÁLVAREZ LPN  Direct: [phone]

## 2024-05-12 LAB — BACTERIA SPEC CULT: NORMAL

## 2024-05-14 LAB
BACTERIA SPEC CULT: ABNORMAL

## 2024-06-11 ENCOUNTER — ANCILLARY PROCEDURE (OUTPATIENT)
Dept: CT IMAGING | Facility: CLINIC | Age: 79
End: 2024-06-11
Attending: STUDENT IN AN ORGANIZED HEALTH CARE EDUCATION/TRAINING PROGRAM
Payer: MEDICARE

## 2024-06-11 DIAGNOSIS — J34.2 NASAL SEPTAL DEVIATION: ICD-10-CM

## 2024-06-11 PROCEDURE — G1010 CDSM STANSON: HCPCS | Mod: GC | Performed by: RADIOLOGY

## 2024-06-11 PROCEDURE — 70486 CT MAXILLOFACIAL W/O DYE: CPT | Mod: MG | Performed by: RADIOLOGY

## 2024-06-17 PROBLEM — J32.0 CHRONIC MAXILLARY SINUSITIS: Status: ACTIVE | Noted: 2024-06-17

## 2024-06-17 PROBLEM — J34.2 NASAL SEPTAL DEVIATION: Status: ACTIVE | Noted: 2024-06-17

## 2024-06-18 ENCOUNTER — OFFICE VISIT (OUTPATIENT)
Dept: OTOLARYNGOLOGY | Facility: CLINIC | Age: 79
End: 2024-06-18
Attending: STUDENT IN AN ORGANIZED HEALTH CARE EDUCATION/TRAINING PROGRAM
Payer: MEDICARE

## 2024-06-18 VITALS
DIASTOLIC BLOOD PRESSURE: 69 MMHG | WEIGHT: 278.9 LBS | HEART RATE: 77 BPM | BODY MASS INDEX: 34.68 KG/M2 | OXYGEN SATURATION: 94 % | SYSTOLIC BLOOD PRESSURE: 140 MMHG | HEIGHT: 75 IN

## 2024-06-18 DIAGNOSIS — J32.0 CHRONIC MAXILLARY SINUSITIS: ICD-10-CM

## 2024-06-18 DIAGNOSIS — J34.2 NASAL SEPTAL DEVIATION: Primary | ICD-10-CM

## 2024-06-18 PROCEDURE — 31231 NASAL ENDOSCOPY DX: CPT | Performed by: STUDENT IN AN ORGANIZED HEALTH CARE EDUCATION/TRAINING PROGRAM

## 2024-06-18 PROCEDURE — 99213 OFFICE O/P EST LOW 20 MIN: CPT | Mod: 25 | Performed by: STUDENT IN AN ORGANIZED HEALTH CARE EDUCATION/TRAINING PROGRAM

## 2024-06-18 ASSESSMENT — PAIN SCALES - GENERAL: PAINLEVEL: NO PAIN (0)

## 2024-06-18 NOTE — PATIENT INSTRUCTIONS
You were seen in the ENT Clinic today by Dr. Wright. If you have any questions or concerns after your appointment, please contact us (see below)      Please return to clinic in 6 months    How to Contact Us:  Send a [employer] message to your provider. Our team will respond to you via [employer]. Occasionally, we will need to call you to get further information.  For urgent matters (Monday-Friday), call the ENT Clinic: [phone] and speak with a call center team member - they will route your call appropriately.   If you'd like to speak directly with a nurse, please find our contact information below. We do our best to check voicemail frequently throughout the day, and will work to call you back within 1-2 days. For urgent matters, please use the general clinic phone numbers listed above.    Nasra PARKER RN, BSN   RN Care Coordinator, ENT Clinic  [facility]  Direct: [phone]  Vanessa ÁLVAREZ LPN  Direct: [phone]

## 2024-06-18 NOTE — NURSING NOTE
"Chief Complaint   Patient presents with    RECHECK   Blood pressure (!) 140/69, pulse 77, height 1.905 m (6' 3\"), weight 126.5 kg (278 lb 14.4 oz), SpO2 94%. Marino Barnes, EMT    "

## 2024-06-18 NOTE — LETTER
6/18/2024       RE: Shola Lemus  [address] [facility] [id number]     Dear Colleague,    Thank you for referring your patient, Shola Lemus, to the [facility] EAR NOSE AND THROAT [facility]INIC [place] [facility]. Please see a copy of my visit note below.      [facility] - Rhinology  Established Patient Visit      Encounter date:   6/18/2024    Assessment, Decision Making, and Plan:  (J34.2) Nasal septal deviation  (primary encounter diagnosis)  (J32.0) Chronic maxillary sinusitis  Comment:   --completed antibiotic and nasal rinse therapy  --follow up CT with improvement in mucosal disease, persistent periapical lucency  --endoscopy with resolution of mucosal edema and purulent drainage  --we discussed that at present, he appears to be responding to the trial of medical therapy - no need for sinus surgery  --we did discuss that a thorough dental evaluation is indicated to see whether #2 requires extraction to prevent ongoing chronic maxillary sinusitis  --we discussed that at present, his ability to breathe through his nose is not bothering him, so we would forego a FPRS eval for possible septorhinoplasty  Plan:  --dental evaluation for #2 to rule out chronic dental infection and to address periapical lucency  --follow up 6 months for symptom check and repeat endoscopy  --rinse as needed    Interval:  Minimal symptoms    History of Present Illness (copied from initial consultation):   Shola Lemus is a 79 year old male who presents for consultation regarding nasal issues.    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     Medical History:  Past Medical History:   Diagnosis Date    Concussion 20 years ago    Diabetes mellitus (H)     Hypercholesteremia     Hypertension         Surgical History:   Past Surgical History:   Procedure Laterality Date    HEAD & NECK SURGERY      tonsillectomy    HERNIA REPAIR      bilat    IR CAROTID CEREBRAL ANGIOGRAM BILATERAL  12/24/2023        Family History:  No family history on file.     Social History:   Social History     Socioeconomic History    Marital status:      Spouse name: None    Number of children: None    Years of education: None    Highest education level: None   Tobacco Use    Smoking status: Never    Smokeless tobacco: Never   Substance and Sexual Activity    Alcohol use: Yes     Comment: one drink every 2 weeks    Drug use: No     Social Determinants of Health     Financial Resource Strain: Low Risk  (2/26/2024)    [facility]    Financial Resource Strain     Difficulty of Paying Living Expenses: 3   Food Insecurity: No Food Insecurity (2/26/2024)    [facility], [facility] [employer] [facility]    Food Insecurity     Worried About Running Out of Food in the Last Year: 1   Transportation Needs: No Transportation Needs (2/26/2024)    [facility], [facility]    Transportation Needs     Lack of Transportation (Medical): 1   Social Connections: Socially Integrated (2/26/2024)    [facility], [facility] [employer] [facility]    Social Connections     Frequency of Communication with Friends and Family: 0   Housing Stability: Low Risk  (2/26/2024)    [facility] [employer] [facility], [facility] [facility]    Housing Stability     Unable to Pay for Housing in the Last Year: 1        Physical Exam:  BP (!) 140/69 (BP Location: Right arm, Patient Position: Sitting, Cuff Size: Adult Large)   Pulse 77   Ht 1.905 m (6' 3\")   Wt 126.5 kg (278 lb 14.4 oz)   SpO2 94%   BMI 34.86 kg/m      General Appearance: No acute distress, appropriate demeanor, conversant  Eyes: moist conjunctivae; EOMI; pupils symmetric; visual acuity grossly intact; no proptosis  Head: normocephalic; overall symmetric appearance without deformity  Face: overall symmetric without deformity  Ears: Normal appearance of external ear; external meatus normal in appearance; TMs intact without perforation bilaterally;   Nose: No external deformity; septum (Significant caudal septal deviation to the left; completely blocks the nasal vestibule and valve, 100%) ; inferior turbinates (non hypertrophic)   Oral Cavity/oropharynx: Normal appearance of mucosa; tongue midline; no mass or lesions; oropharynx without obvious mucosal abnormality  Neck: no palpable lymphadenopathy; thyroid without palpable nodules  Lungs: symmetric chest rise; no wheezing  CV: Good distal perfusion; normal hear rate  Extremities: No deformity  Neurologic Exam: Cranial nerves II-XII are grossly intact; no focal deficit    Procedure Note  Procedure performed: Rigid nasal endoscopy  Indication: To evaluate for sinonasal pathology not visualized on routine anterior rhinoscopy  Anesthesia: 4% topical lidocaine with 0.05% oxymetazoline  Description of procedure: A 30 degree, 3 mm rigid endoscope was inserted into bilateral nasal cavities and the nasal valves, nasal cavity, middle meatus, " sphenoethmoid recess, and nasopharynx were thoroughly evaluated for evidence of obstruction, edema, purulence, polyps and/or mass/lesion.     Findings:    Interval resolution of right sided mucosal edema and drainage    The patient tolerated the procedure well without complication.     Imaging Review:  CT sinus 6/11/2023 - interval improvement in sinonasal opacfication, right periapical lucency, no significant left sided disease    CT-A 12/2023 - primary review reveals complete opacification of the right maxillary sinus; partial opac of the left, patchy opac of the ethmoids, R > L frontal opac, some contrast blush at the right SPA exit.  Large periapical lucency of #2.           Again, thank you for allowing me to participate in the care of your patient.      Sincerely,    Randy Wright MD

## 2024-06-19 NOTE — PROGRESS NOTES
[facility] - Rhinology  Established Patient Visit      Encounter date:   6/18/2024    Assessment, Decision Making, and Plan:  (J34.2) Nasal septal deviation  (primary encounter diagnosis)  (J32.0) Chronic maxillary sinusitis  Comment:   --completed antibiotic and nasal rinse therapy  --follow up CT with improvement in mucosal disease, persistent periapical lucency  --endoscopy with resolution of mucosal edema and purulent drainage  --we discussed that at present, he appears to be responding to the trial of medical therapy - no need for sinus surgery  --we did discuss that a thorough dental evaluation is indicated to see whether #2 requires extraction to prevent ongoing chronic maxillary sinusitis  --we discussed that at present, his ability to breathe through his nose is not bothering him, so we would forego a FPRS eval for possible septorhinoplasty  Plan:  --dental evaluation for #2 to rule out chronic dental infection and to address periapical lucency  --follow up 6 months for symptom check and repeat endoscopy  --rinse as needed    Interval:  Minimal symptoms    History of Present Illness (copied from initial consultation):   Shola Lemus is a 79 year old male who presents for consultation regarding nasal issues.    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      Medical History:  Past Medical History:   Diagnosis Date    Concussion 20 years ago    Diabetes mellitus (H)     Hypercholesteremia     Hypertension         Surgical History:   Past Surgical History:   Procedure Laterality Date    HEAD & NECK SURGERY      tonsillectomy    HERNIA REPAIR      bilat    IR CAROTID CEREBRAL ANGIOGRAM BILATERAL  12/24/2023        Family History:  No family history on file.     Social History:   Social History     Socioeconomic History    Marital status:      Spouse name: None    Number of children: None    Years of education: None    Highest education level: None   Tobacco Use    Smoking status: Never    Smokeless tobacco: Never   Substance and Sexual Activity    Alcohol use: Yes     Comment: one drink every 2 weeks    Drug use: No     Social Determinants of Health     Financial Resource Strain: Low Risk  (2/26/2024)    [facility]    Financial Resource Strain     Difficulty of Paying Living Expenses: 3   Food Insecurity: No Food Insecurity (2/26/2024)    [facility], [facility] [employer] [facility]    Food Insecurity     Worried About Running Out of Food in the Last Year: 1   Transportation Needs: No Transportation Needs (2/26/2024)    Received [facility], [facility]    Transportation Needs     Lack of Transportation (Medical): 1   Social Connections: Socially Integrated (2/26/2024)    Received [facility], [facility] [employer] [facility]    Social Connections     Frequency of Communication with Friends and Family: 0   Housing Stability: Low Risk  (2/26/2024)    Received [facility], [facility] [employer] [facility]    Housing Stability     Unable to Pay  "for Housing in the Last Year: 1        Physical Exam:  BP (!) 140/69 (BP Location: Right arm, Patient Position: Sitting, Cuff Size: Adult Large)   Pulse 77   Ht 1.905 m (6' 3\")   Wt 126.5 kg (278 lb 14.4 oz)   SpO2 94%   BMI 34.86 kg/m      General Appearance: No acute distress, appropriate demeanor, conversant  Eyes: moist conjunctivae; EOMI; pupils symmetric; visual acuity grossly intact; no proptosis  Head: normocephalic; overall symmetric appearance without deformity  Face: overall symmetric without deformity  Ears: Normal appearance of external ear; external meatus normal in appearance; TMs intact without perforation bilaterally;   Nose: No external deformity; septum (Significant caudal septal deviation to the left; completely blocks the nasal vestibule and valve, 100%) ; inferior turbinates (non hypertrophic)   Oral Cavity/oropharynx: Normal appearance of mucosa; tongue midline; no mass or lesions; oropharynx without obvious mucosal abnormality  Neck: no palpable lymphadenopathy; thyroid without palpable nodules  Lungs: symmetric chest rise; no wheezing  CV: Good distal perfusion; normal hear rate  Extremities: No deformity  Neurologic Exam: Cranial nerves II-XII are grossly intact; no focal deficit    Procedure Note  Procedure performed: Rigid nasal endoscopy  Indication: To evaluate for sinonasal pathology not visualized on routine anterior rhinoscopy  Anesthesia: 4% topical lidocaine with 0.05% oxymetazoline  Description of procedure: A 30 degree, 3 mm rigid endoscope was inserted into bilateral nasal cavities and the nasal valves, nasal cavity, middle meatus, sphenoethmoid recess, and nasopharynx were thoroughly evaluated for evidence of obstruction, edema, purulence, polyps and/or mass/lesion.     Findings:    Interval resolution of right sided mucosal edema and drainage    The patient tolerated the procedure well without complication.     Imaging Review:  CT sinus 6/11/2023 - interval improvement in " sinonasal opacfication, right periapical lucency, no significant left sided disease    CT-A 12/2023 - primary review reveals complete opacification of the right maxillary sinus; partial opac of the left, patchy opac of the ethmoids, R > L frontal opac, some contrast blush at the right SPA exit.  Large periapical lucency of #2.     Randy Wright MD    [facility]  Rhinology  Endoscopic Skull Base Surgery  [facility]  Department of Otolaryngology - Head & Neck Surgery

## 2024-11-20 ENCOUNTER — HOSPITAL ENCOUNTER (EMERGENCY)
Facility: CLINIC | Age: 79
Discharge: HOME | End: 2024-11-20
Attending: EMERGENCY MEDICINE | Admitting: EMERGENCY MEDICINE
Payer: MEDICARE

## 2024-11-20 VITALS
HEIGHT: 75 IN | OXYGEN SATURATION: 97 % | WEIGHT: 271 LBS | SYSTOLIC BLOOD PRESSURE: 141 MMHG | HEART RATE: 72 BPM | TEMPERATURE: 97.5 F | RESPIRATION RATE: 14 BRPM | BODY MASS INDEX: 33.69 KG/M2 | DIASTOLIC BLOOD PRESSURE: 77 MMHG

## 2024-11-20 DIAGNOSIS — R42 VERTIGO: ICD-10-CM

## 2024-11-20 LAB
ALBUMIN SERPL BCG-MCNC: 3.8 G/DL (ref 3.5–5.2)
ALP SERPL-CCNC: 96 U/L (ref 40–150)
ALT SERPL W P-5'-P-CCNC: 30 U/L (ref 0–70)
ANION GAP SERPL CALCULATED.3IONS-SCNC: 9 MMOL/L (ref 7–15)
AST SERPL W P-5'-P-CCNC: 28 U/L (ref 0–45)
ATRIAL RATE - MUSE: 70 BPM
BASOPHILS # BLD AUTO: 0 10E3/UL (ref 0–0.2)
BASOPHILS NFR BLD AUTO: 0 %
BILIRUB SERPL-MCNC: 0.3 MG/DL
BUN SERPL-MCNC: 11.4 MG/DL (ref 8–23)
CALCIUM SERPL-MCNC: 8.7 MG/DL (ref 8.8–10.4)
CHLORIDE SERPL-SCNC: 101 MMOL/L (ref 98–107)
CREAT SERPL-MCNC: 0.86 MG/DL (ref 0.67–1.17)
DIASTOLIC BLOOD PRESSURE - MUSE: NORMAL MMHG
EGFRCR SERPLBLD CKD-EPI 2021: 89 ML/MIN/1.73M2
EOSINOPHIL # BLD AUTO: 0.4 10E3/UL (ref 0–0.7)
EOSINOPHIL NFR BLD AUTO: 4 %
ERYTHROCYTE [DISTWIDTH] IN BLOOD BY AUTOMATED COUNT: 14 % (ref 10–15)
GLUCOSE SERPL-MCNC: 144 MG/DL (ref 70–99)
HCO3 SERPL-SCNC: 27 MMOL/L (ref 22–29)
HCT VFR BLD AUTO: 41.5 % (ref 40–53)
HGB BLD-MCNC: 13.3 G/DL (ref 13.3–17.7)
HOLD SPECIMEN: NORMAL
IMM GRANULOCYTES # BLD: 0 10E3/UL
IMM GRANULOCYTES NFR BLD: 0 %
INTERPRETATION ECG - MUSE: NORMAL
LYMPHOCYTES # BLD AUTO: 2.1 10E3/UL (ref 0.8–5.3)
LYMPHOCYTES NFR BLD AUTO: 24 %
MCH RBC QN AUTO: 29.6 PG (ref 26.5–33)
MCHC RBC AUTO-ENTMCNC: 32 G/DL (ref 31.5–36.5)
MCV RBC AUTO: 92 FL (ref 78–100)
MONOCYTES # BLD AUTO: 0.7 10E3/UL (ref 0–1.3)
MONOCYTES NFR BLD AUTO: 8 %
NEUTROPHILS # BLD AUTO: 5.5 10E3/UL (ref 1.6–8.3)
NEUTROPHILS NFR BLD AUTO: 64 %
NRBC # BLD AUTO: 0 10E3/UL
NRBC BLD AUTO-RTO: 0 /100
P AXIS - MUSE: 20 DEGREES
PLATELET # BLD AUTO: 252 10E3/UL (ref 150–450)
POTASSIUM SERPL-SCNC: 4.2 MMOL/L (ref 3.4–5.3)
PR INTERVAL - MUSE: 196 MS
PROT SERPL-MCNC: 7.6 G/DL (ref 6.4–8.3)
QRS DURATION - MUSE: 118 MS
QT - MUSE: 426 MS
QTC - MUSE: 460 MS
R AXIS - MUSE: -56 DEGREES
RBC # BLD AUTO: 4.5 10E6/UL (ref 4.4–5.9)
SODIUM SERPL-SCNC: 137 MMOL/L (ref 135–145)
SYSTOLIC BLOOD PRESSURE - MUSE: NORMAL MMHG
T AXIS - MUSE: 40 DEGREES
TROPONIN T SERPL HS-MCNC: 46 NG/L
TROPONIN T SERPL HS-MCNC: 50 NG/L
VENTRICULAR RATE- MUSE: 70 BPM
WBC # BLD AUTO: 8.7 10E3/UL (ref 4–11)

## 2024-11-20 PROCEDURE — 84155 ASSAY OF PROTEIN SERUM: CPT | Performed by: EMERGENCY MEDICINE

## 2024-11-20 PROCEDURE — 85014 HEMATOCRIT: CPT | Performed by: EMERGENCY MEDICINE

## 2024-11-20 PROCEDURE — 84075 ASSAY ALKALINE PHOSPHATASE: CPT | Performed by: EMERGENCY MEDICINE

## 2024-11-20 PROCEDURE — 84484 ASSAY OF TROPONIN QUANT: CPT | Performed by: EMERGENCY MEDICINE

## 2024-11-20 PROCEDURE — 250N000013 HC RX MED GY IP 250 OP 250 PS 637: Performed by: EMERGENCY MEDICINE

## 2024-11-20 PROCEDURE — 93005 ELECTROCARDIOGRAM TRACING: CPT

## 2024-11-20 PROCEDURE — 85004 AUTOMATED DIFF WBC COUNT: CPT | Performed by: EMERGENCY MEDICINE

## 2024-11-20 PROCEDURE — 99285 EMERGENCY DEPT VISIT HI MDM: CPT | Mod: 25

## 2024-11-20 PROCEDURE — 36415 COLL VENOUS BLD VENIPUNCTURE: CPT | Performed by: EMERGENCY MEDICINE

## 2024-11-20 RX ORDER — MECLIZINE HYDROCHLORIDE 25 MG/1
25-50 TABLET ORAL 3 TIMES DAILY PRN
Qty: 25 TABLET | Refills: 0 | Status: SHIPPED | OUTPATIENT
Start: 2024-11-20

## 2024-11-20 RX ORDER — MECLIZINE HYDROCHLORIDE 25 MG/1
25 TABLET ORAL ONCE
Status: COMPLETED | OUTPATIENT
Start: 2024-11-20 | End: 2024-11-20

## 2024-11-20 RX ADMIN — MECLIZINE HYDROCHLORIDE 25 MG: 25 TABLET ORAL at 12:39

## 2024-11-20 ASSESSMENT — ACTIVITIES OF DAILY LIVING (ADL)
ADLS_ACUITY_SCORE: 0

## 2024-11-20 NOTE — ED TRIAGE NOTES
Pt woke with dizziness, no symptoms when to sean. Hx DMII, BGM= 104. Pt on Eliquis for Hx of Afib.

## 2024-11-20 NOTE — ED PROVIDER NOTES
"  Emergency Department Note      History of Present Illness     Chief Complaint   Dizziness    HPI   Shola Lemus is a 79 year old male with a history of paroxysmal atrial fibrillation anticoagulated on Eliquis, type 2 diabetes mellitus and hypertension who presents for evaluation of a vertigo like symptoms. He reports he went to the bed last night in his usual physical health. He woke up and looked out of the window when he started feeling his eyes beating side to side. He also endorses associated lightheadedness with spontaneous head movements. He otherwise feels fine and denies any headache.    Independent Historian   None    Review of External Notes   I reviewed the [facility] clinic note from 11/15/2024.    Past Medical History     Medical History and Problem List   Type 2 diabetes mellitus   Hypercholesteremia  Hypertension   Paroxysmal atrial fibrilation   Diverticulosis   Ulcerative colitis   Erectile dysfunction  Umbilical hernia     Medications   Atorvastatin   Eliquis   Glipizide   Insulin glargine  Metformin   Metoprolol succinate       Surgical History   Head and neck surgery   Hernia repair  Carotid angiogram  Tonsillectomy   Colonoscopy   ORIF   Physical Exam     Patient Vitals for the past 24 hrs:   BP Temp Pulse Resp SpO2 Height Weight   11/20/24 1230 (!) 141/77 -- 72 14 97 % 1.905 m (6' 3\") 122.9 kg (271 lb)   11/20/24 1210 (!) 150/71 97.5  F (36.4  C) 72 18 97 % -- 122.9 kg (271 lb)     Physical Exam  General: Alert, No distress. Nontoxic appearance  Head: No signs of trauma.   Mouth/Throat: Oropharynx moist.   Eyes: Conjunctivae are normal. Pupils are equal..   Neck: Normal range of motion.    CV: Appears well perfused.  Resp:No respiratory distress.   MSK: Normal range of motion. No obvious deformity.   Neuro: The patient is alert and interactive. [place]. Speech normal. GCS 15.  No nystagmus.  Skin: No lesion or sign of trauma noted.   Psych: normal mood and affect. behavior is normal.  "     Diagnostics     Lab Results   Labs Ordered and Resulted from Time of ED Arrival to Time of ED Departure   COMPREHENSIVE METABOLIC PANEL - Abnormal       Result Value    Sodium 137      Potassium 4.2      Carbon Dioxide (CO2) 27      Anion Gap 9      Urea Nitrogen 11.4      Creatinine 0.86      GFR Estimate 89      Calcium 8.7 (*)     Chloride 101      Glucose 144 (*)     Alkaline Phosphatase 96      AST 28      ALT 30      Protein Total 7.6      Albumin 3.8      Bilirubin Total 0.3     TROPONIN T, HIGH SENSITIVITY - Abnormal    Troponin T, High Sensitivity 46 (*)    TROPONIN T, HIGH SENSITIVITY - Abnormal    Troponin T, High Sensitivity 50 (*)    CBC WITH PLATELETS AND DIFFERENTIAL    WBC Count 8.7      RBC Count 4.50      Hemoglobin 13.3      Hematocrit 41.5      MCV 92      MCH 29.6      MCHC 32.0      RDW 14.0      Platelet Count 252      % Neutrophils 64      % Lymphocytes 24      % Monocytes 8      % Eosinophils 4      % Basophils 0      % Immature Granulocytes 0      NRBCs per 100 WBC 0      Absolute Neutrophils 5.5      Absolute Lymphocytes 2.1      Absolute Monocytes 0.7      Absolute Eosinophils 0.4      Absolute Basophils 0.0      Absolute Immature Granulocytes 0.0      Absolute NRBCs 0.0         Imaging   Head CT w/o contrast   Final Result   IMPRESSION:   No evidence of acute intracranial hemorrhage, mass, or   herniation.         LUIZA CURRIE MD            SYSTEM ID:  [id number]        EKG   ECG taken at 1209, ECG read at 1227  Normal sinus rhythm  Left anterior fascicular block  Can not rule out anterior infarct, age undetermined  Rate 70 bpm. OH interval 196 ms. QRS duration 118 ms. QT/QTc 426/460 ms. P-R-T axes 20 -56 40.    Independent Interpretation   Head CT shows no evidence of intracerebral hemorrhage.    ED Course      Medications Administered   Medications   meclizine (ANTIVERT) tablet 25 mg (25 mg Oral $Given 11/20/24 1231)       Procedures   Procedures     Discussion of Management    None    ED Course   ED Course as of 11/20/24 1525   Wed Nov 20, 2024   1225 I obtained the history and examined the patient as above.    1524 Recheck. I discussed the laboratory and radiology results with the patient and he understands. The patient will be discharged home to follow up with primary care doctor per discharge instructions. Indications for return to the ED were discussed and the patient understands. All questions were answered prior to discharge.       Additional Documentation  None    Medical Decision Making / Diagnosis     CMS Diagnoses: None    MIPS       None    MDM     Shola Lemus is a 79 year old male who presents for evaluation of vertigo. The differential diagnosis of vertigo is broad and includes common etiologies such as menieres disease, labyrinthitis, benign positional vertigo, otitis media, etc.  More serious etiologies considered include central etiologies such as tumor, intracerebral bleed, dissection, ischemic cerebral vascular accident.  The patient has no significant risk factors for stroke and the history, physical exam including detailed neurologic exam, and workup in the emergency room suggests a benign cause of vertigo today.  Patient feels improved after interventions noted above and was able to ambulate without difficulty.   Further outpatient management is indicated with vertigo medications.   Head CT is negative for infarct, bleed or mass.  There are no definite signs of a central and concerning etiology for the vertigo.   Vertigo precautions given for home.      Disposition   The patient was discharged.     Diagnosis     ICD-10-CM    1. Vertigo  R42          Discharge Medications   New Prescriptions    MECLIZINE (ANTIVERT) 25 MG TABLET    Take 1-2 tablets (25-50 mg) by mouth 3 times daily as needed for dizziness.     Scribe Disclosure:  Adriana PALOMINO, am serving as a scribe at 12:19 PM on 11/20/2024 to document services personally performed by Prosper Payton,  MD based on my observations and the provider's statements to me.        Prosper Payton MD  11/20/24 2152       Prosper Payton MD  11/20/24 2158

## 2025-03-22 ENCOUNTER — HEALTH MAINTENANCE LETTER (OUTPATIENT)
Age: 80
End: 2025-03-22

## 2025-05-24 ENCOUNTER — HEALTH MAINTENANCE LETTER (OUTPATIENT)
Age: 80
End: 2025-05-24

## (undated) RX ORDER — HEPARIN SODIUM 200 [USP'U]/100ML
INJECTION, SOLUTION INTRAVENOUS
Status: DISPENSED
Start: 2023-12-24

## (undated) RX ORDER — ONDANSETRON 2 MG/ML
INJECTION INTRAMUSCULAR; INTRAVENOUS
Status: DISPENSED
Start: 2023-12-24

## (undated) RX ORDER — FENTANYL CITRATE 50 UG/ML
INJECTION, SOLUTION INTRAMUSCULAR; INTRAVENOUS
Status: DISPENSED
Start: 2023-12-24

## (undated) RX ORDER — LIDOCAINE HYDROCHLORIDE 10 MG/ML
INJECTION, SOLUTION INFILTRATION; PERINEURAL
Status: DISPENSED
Start: 2023-12-24